# Patient Record
Sex: MALE | Race: WHITE | NOT HISPANIC OR LATINO | Employment: OTHER | URBAN - METROPOLITAN AREA
[De-identification: names, ages, dates, MRNs, and addresses within clinical notes are randomized per-mention and may not be internally consistent; named-entity substitution may affect disease eponyms.]

---

## 2017-03-16 DIAGNOSIS — I10 ESSENTIAL HYPERTENSION WITH GOAL BLOOD PRESSURE LESS THAN 140/90: ICD-10-CM

## 2017-03-17 NOTE — TELEPHONE ENCOUNTER
hydrochlorothiazide (HYDRODIURIL) 25 MG      Last Written Prescription Date: 10/9/2016  Last Fill Quantity: 90, # refills: 1  Last Office Visit with [facility], [facility] or [facility] prescribing provider: 5/25/2016       Potassium   Date Value Ref Range Status   05/25/2016 4.3 3.4 - 5.3 mmol/L Final     Creatinine   Date Value Ref Range Status   05/25/2016 1.45 (H) 0.66 - 1.25 mg/dL Final     BP Readings from Last 3 Encounters:   10/06/16 151/88   05/25/16 (!) 153/98   04/21/15 118/80

## 2017-03-21 RX ORDER — HYDROCHLOROTHIAZIDE 25 MG/1
25 TABLET ORAL DAILY
Qty: 90 TABLET | Refills: 0 | Status: SHIPPED | OUTPATIENT
Start: 2017-03-21 | End: 2017-05-31

## 2017-03-21 NOTE — TELEPHONE ENCOUNTER
Routing refill request to provider for review/approval because:  Labs out of range:  Cr, BP above desired range. Please advise.  Alexandra Valladares, RN  Triage Nurse

## 2017-04-14 ENCOUNTER — TRANSFERRED RECORDS (OUTPATIENT)
Dept: HEALTH INFORMATION MANAGEMENT | Facility: CLINIC | Age: 57
End: 2017-04-14

## 2017-04-20 ENCOUNTER — TRANSFERRED RECORDS (OUTPATIENT)
Dept: HEALTH INFORMATION MANAGEMENT | Facility: CLINIC | Age: 57
End: 2017-04-20

## 2017-05-31 ENCOUNTER — OFFICE VISIT (OUTPATIENT)
Dept: PEDIATRICS | Facility: CLINIC | Age: 57
End: 2017-05-31
Payer: COMMERCIAL

## 2017-05-31 VITALS
TEMPERATURE: 98.6 F | BODY MASS INDEX: 34.1 KG/M2 | WEIGHT: 225 LBS | DIASTOLIC BLOOD PRESSURE: 72 MMHG | SYSTOLIC BLOOD PRESSURE: 110 MMHG | HEART RATE: 60 BPM | HEIGHT: 68 IN

## 2017-05-31 DIAGNOSIS — I10 ESSENTIAL HYPERTENSION WITH GOAL BLOOD PRESSURE LESS THAN 140/90: ICD-10-CM

## 2017-05-31 DIAGNOSIS — K60.2 ANAL FISSURE: ICD-10-CM

## 2017-05-31 DIAGNOSIS — Z12.5 ENCOUNTER FOR SCREENING FOR MALIGNANT NEOPLASM OF PROSTATE: ICD-10-CM

## 2017-05-31 DIAGNOSIS — E78.5 HYPERLIPIDEMIA LDL GOAL <130: ICD-10-CM

## 2017-05-31 DIAGNOSIS — Z01.818 PREOP GENERAL PHYSICAL EXAM: Primary | ICD-10-CM

## 2017-05-31 LAB
ALBUMIN SERPL-MCNC: 4.3 G/DL (ref 3.4–5)
ALP SERPL-CCNC: 100 U/L (ref 40–150)
ALT SERPL W P-5'-P-CCNC: 57 U/L (ref 0–70)
ANION GAP SERPL CALCULATED.3IONS-SCNC: 11 MMOL/L (ref 3–14)
AST SERPL W P-5'-P-CCNC: 39 U/L (ref 0–45)
BILIRUB SERPL-MCNC: 1.3 MG/DL (ref 0.2–1.3)
BUN SERPL-MCNC: 19 MG/DL (ref 7–30)
CALCIUM SERPL-MCNC: 8.8 MG/DL (ref 8.5–10.1)
CHLORIDE SERPL-SCNC: 104 MMOL/L (ref 94–109)
CHOLEST SERPL-MCNC: 215 MG/DL
CO2 SERPL-SCNC: 23 MMOL/L (ref 20–32)
CREAT SERPL-MCNC: 1.81 MG/DL (ref 0.66–1.25)
GFR SERPL CREATININE-BSD FRML MDRD: 39 ML/MIN/1.7M2
GLUCOSE SERPL-MCNC: 100 MG/DL (ref 70–99)
HDLC SERPL-MCNC: 42 MG/DL
LDLC SERPL CALC-MCNC: 128 MG/DL
NONHDLC SERPL-MCNC: 173 MG/DL
POTASSIUM SERPL-SCNC: 4.1 MMOL/L (ref 3.4–5.3)
PROT SERPL-MCNC: 7.9 G/DL (ref 6.8–8.8)
PSA SERPL-ACNC: 8.47 UG/L (ref 0–4)
SODIUM SERPL-SCNC: 138 MMOL/L (ref 133–144)
TRIGL SERPL-MCNC: 225 MG/DL

## 2017-05-31 PROCEDURE — 80053 COMPREHEN METABOLIC PANEL: CPT | Performed by: INTERNAL MEDICINE

## 2017-05-31 PROCEDURE — 80061 LIPID PANEL: CPT | Performed by: INTERNAL MEDICINE

## 2017-05-31 PROCEDURE — G0103 PSA SCREENING: HCPCS | Performed by: INTERNAL MEDICINE

## 2017-05-31 PROCEDURE — 36415 COLL VENOUS BLD VENIPUNCTURE: CPT | Performed by: INTERNAL MEDICINE

## 2017-05-31 PROCEDURE — 99214 OFFICE O/P EST MOD 30 MIN: CPT | Performed by: INTERNAL MEDICINE

## 2017-05-31 RX ORDER — LISINOPRIL AND HYDROCHLOROTHIAZIDE 12.5; 2 MG/1; MG/1
1 TABLET ORAL
Qty: 180 TABLET | Refills: 3 | Status: SHIPPED | OUTPATIENT
Start: 2017-05-31 | End: 2017-07-02

## 2017-05-31 RX ORDER — LISINOPRIL 20 MG/1
20 TABLET ORAL 2 TIMES DAILY
Qty: 180 TABLET | Refills: 3 | Status: CANCELLED | OUTPATIENT
Start: 2017-05-31

## 2017-05-31 RX ORDER — SIMVASTATIN 40 MG
40 TABLET ORAL AT BEDTIME
Qty: 90 TABLET | Refills: 3 | Status: SHIPPED | OUTPATIENT
Start: 2017-05-31 | End: 2018-04-21

## 2017-05-31 RX ORDER — HYDROCHLOROTHIAZIDE 25 MG/1
25 TABLET ORAL DAILY
Qty: 90 TABLET | Refills: 0 | Status: CANCELLED | OUTPATIENT
Start: 2017-05-31

## 2017-05-31 NOTE — PATIENT INSTRUCTIONS
INSTRUCTIONS FOR TODAY:     we are combining lisinopril and hydrochlorothiazide in 1 pill--taken twice per day   do not take lisinopril morning of surgery     Dr Reagan      Preventive Health Recommendations  Male Ages 50 - 64    Yearly exam:             See your health care provider every year in order to  o   Review health changes.   o   Discuss preventive care.    o   Review your medicines if your doctor has prescribed any.     Have a cholesterol test every 5 years, or more frequently if you are at risk for high cholesterol/heart disease.     Have a diabetes test (fasting glucose) every three years. If you are at risk for diabetes, you should have this test more often.     Have a colonoscopy at age 50, or have a yearly FIT test (stool test). These exams will check for colon cancer.      Talk with your health care provider about whether or not a prostate cancer screening test (PSA) is right for you.    You should be tested each year for STDs (sexually transmitted diseases), if you re at risk.     Shots: Get a flu shot each year. Get a tetanus shot every 10 years.     Nutrition:    Eat at least 5 servings of fruits and vegetables daily.     Eat whole-grain bread, whole-wheat pasta and brown rice instead of white grains and rice.     Talk to your provider about Calcium and Vitamin D.     Lifestyle    Exercise for at least 150 minutes a week (30 minutes a day, 5 days a week). This will help you control your weight and prevent disease.     Limit alcohol to one drink per day.     No smoking.     Wear sunscreen to prevent skin cancer.     See your dentist every six months for an exam and cleaning.     See your eye doctor every 1 to 2 years.    Before Your Surgery      Call your surgeon if there is any change in your health. This includes signs of a cold or flu (such as a sore throat, runny nose, cough, rash or fever).    Do not smoke, drink alcohol or take over the counter medicine (unless your surgeon or primary care  doctor tells you to) for the 24 hours before and after surgery.    If you take prescribed drugs: Follow your doctor s orders about which medicines to take and which to stop until after surgery.    Eating and drinking prior to surgery: follow the instructions from your surgeon    Take a shower or bath the night before surgery. Use the soap your surgeon gave you to gently clean your skin. If you do not have soap from your surgeon, use your regular soap. Do not shave or scrub the surgery site.  Wear clean pajamas and have clean sheets on your bed.

## 2017-05-31 NOTE — MR AVS SNAPSHOT
After Visit Summary   5/31/2017    Yrn Will    MRN: [id number]           Patient Information     Date Of Birth          1960        Visit Information        Provider Department      5/31/2017 8:20 AM Enrico Reagan MD [facility]        Today's Diagnoses     Preop general physical exam    -  1    Essential hypertension with goal blood pressure less than 140/90        Hyperlipidemia LDL goal <130        Encounter for screening for malignant neoplasm of prostate          Care Instructions    INSTRUCTIONS FOR TODAY:     we are combining lisinopril and hydrochlorothiazide in 1 pill--taken twice per day   do not take lisinopril morning of surgery     Dr Reagan      Preventive Health Recommendations  Male Ages 50 - 64    Yearly exam:             See your health care provider every year in order to  o   Review health changes.   o   Discuss preventive care.    o   Review your medicines if your doctor has prescribed any.     Have a cholesterol test every 5 years, or more frequently if you are at risk for high cholesterol/heart disease.     Have a diabetes test (fasting glucose) every three years. If you are at risk for diabetes, you should have this test more often.     Have a colonoscopy at age 50, or have a yearly FIT test (stool test). These exams will check for colon cancer.      Talk with your health care provider about whether or not a prostate cancer screening test (PSA) is right for you.    You should be tested each year for STDs (sexually transmitted diseases), if you re at risk.     Shots: Get a flu shot each year. Get a tetanus shot every 10 years.     Nutrition:    Eat at least 5 servings of fruits and vegetables daily.     Eat whole-grain bread, whole-wheat pasta and brown rice instead of white grains and rice.     Talk to your provider about Calcium and Vitamin D.     Lifestyle    Exercise for at least 150 minutes a week (30 minutes a day, 5 days a week). This will help  you control your weight and prevent disease.     Limit alcohol to one drink per day.     No smoking.     Wear sunscreen to prevent skin cancer.     See your dentist every six months for an exam and cleaning.     See your eye doctor every 1 to 2 years.    Before Your Surgery      Call your surgeon if there is any change in your health. This includes signs of a cold or flu (such as a sore throat, runny nose, cough, rash or fever).    Do not smoke, drink alcohol or take over the counter medicine (unless your surgeon or primary care doctor tells you to) for the 24 hours before and after surgery.    If you take prescribed drugs: Follow your doctor s orders about which medicines to take and which to stop until after surgery.    Eating and drinking prior to surgery: follow the instructions from your surgeon    Take a shower or bath the night before surgery. Use the soap your surgeon gave you to gently clean your skin. If you do not have soap from your surgeon, use your regular soap. Do not shave or scrub the surgery site.  Wear clean pajamas and have clean sheets on your bed.           Follow-ups after your visit        Who to contact     If you have questions or need follow up information about today's clinic visit or your schedule please contact [facility] directly at [phone].  Normal or non-critical lab and imaging results will be communicated to you by [employer], letter or phone within 4 business days after the clinic has received the results. If you do not hear from us within 7 days, please contact the clinic through [employer] or phone. If you have a critical or abnormal lab result, we will notify you by phone as soon as possible.  Submit refill requests through [employer] or call your pharmacy and they will forward the refill request to us. Please allow 3 business days for your refill to be completed.          Additional Information About Your Visit        [employer] Information     [employer] gives you secure  "access to your electronic health record. If you see a primary care provider, you can also send messages to your care team and make appointments. If you have questions, please call your primary care clinic.  If you do not have a primary care provider, please call [phone] and they will assist you.        Care EveryWhere ID     This is your Care EveryWhere ID. This could be used by other organizations to access your [place] medical records  [id number]        Your Vitals Were     Pulse Temperature Height BMI (Body Mass Index)          60 98.6  F (37  C) (Oral) 5' 8\" (1.727 m) 34.21 kg/m2         Blood Pressure from Last 3 Encounters:   05/31/17 110/72   10/06/16 151/88   05/25/16 (!) 153/98    Weight from Last 3 Encounters:   05/31/17 225 lb (102.1 kg)   05/25/16 227 lb (103 kg)   04/21/15 225 lb (102.1 kg)              We Performed the Following     Comprehensive metabolic panel (BMP + Alb, Alk Phos, ALT, AST, Total. Bili, TP)     Lipid Profile with reflex to direct LDL     PSA, screen          Today's Medication Changes          These changes are accurate as of: 5/31/17  8:49 AM.  If you have any questions, ask your nurse or doctor.               Start taking these medicines.        Dose/Directions    lisinopril-hydrochlorothiazide 20-12.5 MG per tablet   Commonly known as:  PRINZIDE/ZESTORETIC   Used for:  Essential hypertension with goal blood pressure less than 140/90   Started by:  Enrico Reagan MD        Dose:  1 tablet   Take 1 tablet by mouth 2 times daily   Quantity:  180 tablet   Refills:  3         Stop taking these medicines if you haven't already. Please contact your care team if you have questions.     hydrochlorothiazide 25 MG tablet   Commonly known as:  HYDRODIURIL   Stopped by:  Enrico Reagan MD           lisinopril 20 MG tablet   Commonly known as:  PRINIVIL/ZESTRIL   Stopped by:  Enrico Reagan MD                Where to get your medicines      These medications were sent " to [facility] - [place], [place] - [address] [facility]  [address] [facility] [address]     Phone:  [phone]     lisinopril-hydrochlorothiazide 20-12.5 MG per tablet    simvastatin 40 MG tablet                Primary Care Provider Office Phone # Fax #    Enrico Reagan [phone]       [address] [place] [address]        Thank you!     Thank you for choosing [facility]  for your care. Our goal is always to provide you with excellent care. Hearing back from our patients is one way we can continue to improve our services. Please take a few minutes to complete the written survey that you may receive in the mail after your visit with us. Thank you!             Your Updated Medication List - Protect others around you: Learn how to safely use, store and throw away your medicines at www.disposemymeds.org.          This list is accurate as of: 5/31/17  8:49 AM.  Always use your most recent med list.                   Brand Name Dispense Instructions for use    albuterol 108 (90 BASE) MCG/ACT Inhaler    PROAIR HFA/PROVENTIL HFA/VENTOLIN HFA    2 Inhaler    Inhale 2 puffs into the lungs every 4 hours as needed for shortness of breath / dyspnea       aspirin 325 MG tablet      Take  by mouth daily.       lisinopril-hydrochlorothiazide 20-12.5 MG per tablet    PRINZIDE/ZESTORETIC    180 tablet    Take 1 tablet by mouth 2 times daily       omeprazole 20 MG CR capsule    priLOSEC    90 capsule    Take 1 capsule (20 mg) by mouth daily as needed       simvastatin 40 MG tablet    ZOCOR    90 tablet    Take 1 tablet (40 mg) by mouth At Bedtime

## 2017-05-31 NOTE — PROGRESS NOTES
"SUBJECTIVE:     CC: Yrn iWll is an 57 year old male who presents for preventative health visit.     Physical   Annual:     Getting at least 3 servings of Calcium per day::  Yes    Bi-annual eye exam::  Yes    Dental care twice a year::  Yes    Sleep apnea or symptoms of sleep apnea::  None    Diet::  Regular (no restrictions)    Taking medications regularly::  Yes    Medication side effects::  None    Additional concerns today::  No              Today's PHQ-2 Score:   PHQ-2 ( 1999 [employer]izer) 5/31/2017   Q1: Little interest or pleasure in doing things 0   Q2: Feeling down, depressed or hopeless 0   PHQ-2 Score 0   Q1: Little interest or pleasure in doing things Not at all   Q2: Feeling down, depressed or hopeless Not at all   PHQ-2 Score 0       Abuse: Current or Past(Physical, Sexual or Emotional)- No  Do you feel safe in your environment - Yes    Social History   Substance Use Topics     Smoking status: Never Smoker     Smokeless tobacco: Never Used     Alcohol use No     The patient does not drink >3 drinks per day nor >7 drinks per week.    Last PSA:   PSA   Date Value Ref Range Status   10/06/2016 5.52 (H) 0 - 4 ug/L Final     Comment:     Assay Method:  Chemiluminescence using Siemens Vista analyzer       Recent Labs   Lab Test  05/25/16   0848  01/09/15   0820  01/02/14   0844   CHOL  188  188  174   HDL  44  49  47   LDL  116*  108  102   TRIG  138  157*  122   CHOLHDLRATIO   --   3.8  3.7   NHDL  144*   --    --        Reviewed orders with patient. Reviewed health maintenance and updated orders accordingly - Yes    Reviewed and updated as needed this visit by clinical staff         Reviewed and updated as needed this visit by Provider        {HISTORY OPTIONS:775982}    ROS:  {MALE ROS-adult preventive care package:274345::\"C: NEGATIVE for fever, chills, change in weight\",\"I: NEGATIVE for worrisome rashes, moles or lesions\",\"E: NEGATIVE for vision changes or irritation\",\"ENT: NEGATIVE for ear, mouth and " "throat problems\",\"R: NEGATIVE for significant cough or SOB\",\"CV: NEGATIVE for chest pain, palpitations or peripheral edema\",\"GI: NEGATIVE for nausea, abdominal pain, heartburn, or change in bowel habits\",\" male: negative for dysuria, hematuria, decreased urinary stream, erectile dysfunction, urethral discharge\",\"M: NEGATIVE for significant arthralgias or myalgia\",\"N: NEGATIVE for weakness, dizziness or paresthesias\",\"P: NEGATIVE for changes in mood or affect\"}    {CHRONICPROBDATA:081238}  OBJECTIVE:     There were no vitals taken for this visit.  EXAM:  {Exam Choices:863160}    ASSESSMENT/PLAN:     {Diag Picklist:184154}    COUNSELING:   {MALE COUNSELING MESSAGES:960846::\"Reviewed preventive health counseling, as reflected in patient instructions\"}    {Blood Pressure - Adult Preventive:165486}     reports that he has never smoked. He has never used smokeless tobacco.  {Tobacco Cessation needed for ACO -- Delete if patient is a non-smoker:525525}  Estimated body mass index is 34.52 kg/(m^2) as calculated from the following:    Height as of 5/25/16: 5' 8\" (1.727 m).    Weight as of 5/25/16: 227 lb (103 kg).   {Weight Management Plan needed for ACO:219432}    Counseling Resources:  ATP IV Guidelines  Pooled Cohorts Equation Calculator  FRAX Risk Assessment  ICSI Preventive Guidelines  Dietary Guidelines for Americans, 2010  USDA's MyPlate  ASA Prophylaxis  Lung CA Screening    Enrico Reagan MD, MD  [facility] [place]  "

## 2017-05-31 NOTE — PROGRESS NOTES
[facility]  [address]  [address]  [place] [address] [phone]  Dept: [phone]    PRE-OP EVALUATION:  Today's date: 2017    Yrn Will (: 1960) presents for pre-operative evaluation assessment as requested by Dr. Stanley.  He requires evaluation and anesthesia risk assessment prior to undergoing surgery/procedure for treatment of fissure .  Proposed procedure: repair    Date of Surgery/ Procedure: 17  Time of Surgery/ Procedure:   Hospital/Surgical Facility: [facility]    Primary Physician: Enrico Reagan  Type of Anesthesia Anticipated: Local    Patient has a Health Care Directive or Living Will:  NO    1. NO - Do you have a history of heart attack, stroke, stent, bypass or surgery on an artery in the head, neck, heart or legs?  2. NO - Do you ever have any pain or discomfort in your chest?  3. NO - Do you have a history of  Heart Failure?  4. NO - Are you troubled by shortness of breath when: walking on the level, up a slight hill or at night?  5. NO - Do you currently have a cold, bronchitis or other respiratory infection?  6. NO - Do you have a cough, shortness of breath or wheezing?  7. NO - Do you sometimes get pains in the calves of your legs when you walk?  8. NO - Do you or anyone in your family have previous history of blood clots?  9. NO - Do you or does anyone in your family have a serious bleeding problem such as prolonged bleeding following surgeries or cuts?  10. NO - Have you ever had problems with anemia or been told to take iron pills?  11. NO - Have you had any abnormal blood loss such as black, tarry or bloody stools, or abnormal vaginal bleeding?  12. NO - Have you ever had a blood transfusion?  13. NO - Have you or any of your relatives ever had problems with anesthesia?  14. NO - Do you have sleep apnea, excessive snoring or daytime drowsiness?  15. NO - Do you have any prosthetic heart valves?  16. NO - Do you have  prosthetic joints?      HPI:                                                      Brief HPI related to upcoming procedure:   Yrn Will is a 57 year old who presents for preoperative evaluation as requested by Dr Stanley prior to repair anal fissure.     MEDICAL HISTORY:                                                      Patient Active Problem List    Diagnosis Date Noted     CKD (chronic kidney disease) stage 3, GFR 30-59 ml/min 01/11/2014     Priority: High     Prediabetes 02/26/2012     Priority: High     Hyperlipidemia LDL goal <130 09/16/2009     Priority: High     Elevated prostate specific antigen (PSA) 06/04/2016     Priority: Medium     Essential hypertension with goal blood pressure less than 140/90 05/25/2016     Priority: Medium     Gout, unspecified cause, unspecified chronicity, unspecified site 05/25/2016     Priority: Medium     Gastroesophageal reflux disease without esophagitis 05/25/2016     Priority: Medium     Obesity, unspecified obesity severity, unspecified obesity type 05/25/2016     Priority: Medium     Benign non-nodular prostatic hyperplasia with lower urinary tract symptoms 05/25/2016     Priority: Medium     Gilbert's disease      Priority: Medium     Hepatic steatosis 10/25/2009     Priority: Medium     Advanced directives, counseling/discussion 01/02/2014     Priority: Low     Advance Care Planning:                 Past Medical History:   Diagnosis Date     Esophageal reflux 9/16/2009     Gilbert's disease      Gout 9/16/2009     HTN (hypertension) 9/16/2009     Hyperlipidemia LDL goal <130 9/16/2009     Past Surgical History:   Procedure Laterality Date     LASIK  2006     SURGICAL HISTORY OF -       repair right hydronephrosis, age 14     Current Outpatient Prescriptions   Medication Sig Dispense Refill     simvastatin (ZOCOR) 40 MG tablet Take 1 tablet (40 mg) by mouth At Bedtime 90 tablet 3     lisinopril-hydrochlorothiazide (PRINZIDE/ZESTORETIC) 20-12.5 MG per tablet Take 1  "tablet by mouth 2 times daily 180 tablet 3     omeprazole (PRILOSEC) 20 MG capsule Take 1 capsule (20 mg) by mouth daily as needed 90 capsule 3             albuterol (PROAIR HFA, PROVENTIL HFA, VENTOLIN HFA) 108 (90 BASE) MCG/ACT inhaler Inhale 2 puffs into the lungs every 4 hours as needed for shortness of breath / dyspnea 2 Inhaler 3     aspirin 325 MG tablet Take  by mouth daily.       OTC products: None, except as noted above    No Known Allergies   Latex Allergy: NO    Social History   Substance Use Topics     Smoking status: Never Smoker     Smokeless tobacco: Never Used     Alcohol use No     History   Drug Use No       REVIEW OF SYSTEMS:                                                    Constitutional, neuro, ENT, endocrine, pulmonary, cardiac, gastrointestinal, genitourinary, musculoskeletal, integument and psychiatric systems are negative, except as otherwise noted.    EXAM:                                                    /72 (Cuff Size: Adult Large)  Pulse 60  Temp 98.6  F (37  C) (Oral)  Ht 5' 8\" (1.727 m)  Wt 225 lb (102.1 kg)  BMI 34.21 kg/m2    GENERAL APPEARANCE: healthy, alert and no distress     EYES: EOMI,  PERRL     HENT: ear canals and TM's normal and nose and mouth without ulcers or lesions     NECK: no adenopathy, no asymmetry, masses, or scars and thyroid normal to palpation     RESP: lungs clear to auscultation - no rales, rhonchi or wheezes     CV: regular rates and rhythm, normal S1 S2, no S3 or S4 and no murmur, click or rub     ABDOMEN:  soft, nontender, no HSM or masses and bowel sounds normal     MS: Extremities without edema     SKIN: no suspicious lesions or rashes     NEURO: Normal strength and tone, sensory exam grossly normal, mentation intact and speech normal     PSYCH: mentation appears normal. and affect normal/bright    DIAGNOSTICS:                                                    Labwork pending    Recent Labs   Lab Test  05/25/16   0848  01/09/15   0820  " 01/09/14   1150   01/03/13   0847   HGB   --    --   16.2   --    --    NA  142  140   --    < >  144   POTASSIUM  4.3  4.2   --    < >  4.4   CR  1.45*  1.39*   --    < >  1.38*   A1C   --    --    --    --   5.5    < > = values in this interval not displayed.      IMPRESSION:                                                    Reason for surgery/procedure: anal fissure  Diagnosis/reason for consult: preop    The proposed surgical procedure is considered LOW risk.    REVISED CARDIAC RISK INDEX  The patient has the following serious cardiovascular risks for perioperative complications such as (MI, PE, VFib and 3  AV Block):  No serious cardiac risks  INTERPRETATION: 0 risks: Class I (very low risk - 0.4% complication rate)    The patient has the following additional risks for perioperative complications:  No identified additional risks      ICD-10-CM    1. Preop general physical exam Z01.818      2. Anal fissure K60.2      3. Essential hypertension with goal blood pressure less than 140/90 I10 lisinopril-hydrochlorothiazide (PRINZIDE/ZESTORETIC) 20-12.5 MG per tablet     Well-controlled, hold lisinopril morning of procedure      4. Hyperlipidemia LDL goal <130 E78.5 simvastatin (ZOCOR) 40 MG tablet     Lipid Profile with reflex to direct LDL     Comprehensive metabolic panel (BMP + Alb, Alk Phos, ALT, AST, Total. Bili, TP)      Fasting for lab work today.      5. Encounter for screening for malignant neoplasm of prostate Z12.5 PSA, screen              RECOMMENDATIONS:                                                      --Patient is to take all scheduled medications on the day of surgery EXCEPT as noted above    APPROVAL GIVEN to proceed with proposed procedure, without further diagnostic evaluation       Signed Electronically by: Enrico Reagan MD, MD    Copy of this evaluation report is provided to requesting physician.    [facility] Preop Guidelines

## 2017-05-31 NOTE — NURSING NOTE
"Chief Complaint   Patient presents with     Physical       Initial /72 (Cuff Size: Adult Large)  Pulse 60  Temp 98.6  F (37  C) (Oral)  Ht 5' 8\" (1.727 m)  Wt 225 lb (102.1 kg)  BMI 34.21 kg/m2 Estimated body mass index is 34.21 kg/(m^2) as calculated from the following:    Height as of this encounter: 5' 8\" (1.727 m).    Weight as of this encounter: 225 lb (102.1 kg).  Medication Reconciliation: capri Mann CMA    "

## 2017-06-04 ENCOUNTER — TELEPHONE (OUTPATIENT)
Dept: PEDIATRICS | Facility: CLINIC | Age: 57
End: 2017-06-04

## 2017-06-04 DIAGNOSIS — R97.20 ELEVATED PROSTATE SPECIFIC ANTIGEN (PSA): Primary | ICD-10-CM

## 2017-06-04 DIAGNOSIS — N18.30 CKD (CHRONIC KIDNEY DISEASE) STAGE 3, GFR 30-59 ML/MIN (H): ICD-10-CM

## 2017-06-05 DIAGNOSIS — N18.30 CKD (CHRONIC KIDNEY DISEASE) STAGE 3, GFR 30-59 ML/MIN (H): ICD-10-CM

## 2017-06-05 PROCEDURE — 80048 BASIC METABOLIC PNL TOTAL CA: CPT | Performed by: INTERNAL MEDICINE

## 2017-06-05 PROCEDURE — 36415 COLL VENOUS BLD VENIPUNCTURE: CPT | Performed by: INTERNAL MEDICINE

## 2017-06-05 NOTE — TELEPHONE ENCOUNTER
Please call pt.      1) Recent labwork significant for rising PSA  Lab Results   Component Value Date    PSA 8.47 05/31/2017    PSA 5.52 10/06/2016     Needs to schedule visit with Urology:   Referral info:  [facility]: [facility] [phone]   [email or web address]    2) kidney function test has worsened some (may be due to period fasting)  Lab Results   Component Value Date    CR 1.81 05/31/2017    CR 1.45 05/25/2016      please have him return for non fasting labwork to recheck this week.    Enrico Reagan MD

## 2017-06-05 NOTE — TELEPHONE ENCOUNTER
Notified patient. Gave him the info from . He scheduled a lab appointment for today.  RICHELLE Mann

## 2017-06-06 LAB
ANION GAP SERPL CALCULATED.3IONS-SCNC: 7 MMOL/L (ref 3–14)
BUN SERPL-MCNC: 25 MG/DL (ref 7–30)
CALCIUM SERPL-MCNC: 8.7 MG/DL (ref 8.5–10.1)
CHLORIDE SERPL-SCNC: 105 MMOL/L (ref 94–109)
CO2 SERPL-SCNC: 28 MMOL/L (ref 20–32)
CREAT SERPL-MCNC: 1.89 MG/DL (ref 0.66–1.25)
GFR SERPL CREATININE-BSD FRML MDRD: 37 ML/MIN/1.7M2
GLUCOSE SERPL-MCNC: 98 MG/DL (ref 70–99)
POTASSIUM SERPL-SCNC: 4.5 MMOL/L (ref 3.4–5.3)
SODIUM SERPL-SCNC: 140 MMOL/L (ref 133–144)

## 2017-06-11 ENCOUNTER — TELEPHONE (OUTPATIENT)
Dept: PEDIATRICS | Facility: CLINIC | Age: 57
End: 2017-06-11

## 2017-06-11 DIAGNOSIS — N18.30 CKD (CHRONIC KIDNEY DISEASE) STAGE 3, GFR 30-59 ML/MIN (H): Primary | ICD-10-CM

## 2017-06-11 NOTE — TELEPHONE ENCOUNTER
Lab Results   Component Value Date    CR 1.89 06/05/2017    CR 1.81 05/31/2017     Please call patient. Follow-up renal function tests are slightly elevated compared to baseline.  Please have him reduce lisinopril HCTZ to 1 tab daily rather than 2.  Return for repeat lab draw in 1 week. Encourage plenty of fluids in the meantime.  Please have patient follow home blood pressure or return for nurse blood pressure check after the medication change in 1 week    (Suspect renal function has worsened secondary to ACEI combined with diuretic.  may need to add amlodipine)

## 2017-06-13 ENCOUNTER — TRANSFERRED RECORDS (OUTPATIENT)
Dept: HEALTH INFORMATION MANAGEMENT | Facility: CLINIC | Age: 57
End: 2017-06-13

## 2017-06-28 ENCOUNTER — ALLIED HEALTH/NURSE VISIT (OUTPATIENT)
Dept: PEDIATRICS | Facility: CLINIC | Age: 57
End: 2017-06-28
Payer: COMMERCIAL

## 2017-06-28 VITALS — SYSTOLIC BLOOD PRESSURE: 132 MMHG | DIASTOLIC BLOOD PRESSURE: 84 MMHG

## 2017-06-28 DIAGNOSIS — N18.30 CKD (CHRONIC KIDNEY DISEASE) STAGE 3, GFR 30-59 ML/MIN (H): ICD-10-CM

## 2017-06-28 DIAGNOSIS — I10 ESSENTIAL HYPERTENSION WITH GOAL BLOOD PRESSURE LESS THAN 140/90: Primary | ICD-10-CM

## 2017-06-28 LAB
ANION GAP SERPL CALCULATED.3IONS-SCNC: 7 MMOL/L (ref 3–14)
BUN SERPL-MCNC: 18 MG/DL (ref 7–30)
CALCIUM SERPL-MCNC: 9.2 MG/DL (ref 8.5–10.1)
CHLORIDE SERPL-SCNC: 107 MMOL/L (ref 94–109)
CO2 SERPL-SCNC: 26 MMOL/L (ref 20–32)
CREAT SERPL-MCNC: 1.76 MG/DL (ref 0.66–1.25)
GFR SERPL CREATININE-BSD FRML MDRD: 40 ML/MIN/1.7M2
GLUCOSE SERPL-MCNC: 97 MG/DL (ref 70–99)
POTASSIUM SERPL-SCNC: 4.5 MMOL/L (ref 3.4–5.3)
SODIUM SERPL-SCNC: 140 MMOL/L (ref 133–144)

## 2017-06-28 PROCEDURE — 36415 COLL VENOUS BLD VENIPUNCTURE: CPT | Performed by: INTERNAL MEDICINE

## 2017-06-28 PROCEDURE — 80048 BASIC METABOLIC PNL TOTAL CA: CPT | Performed by: INTERNAL MEDICINE

## 2017-06-28 PROCEDURE — 99207 ZZC NO CHARGE NURSE ONLY: CPT | Performed by: INTERNAL MEDICINE

## 2017-06-28 NOTE — PROGRESS NOTES
Yrn Will is enrolled/participating in the retail pharmacy Blood Pressure Goals Achievement Program (BPGAP).  Yrn Will was evaluated at [facility] on June 28, 2017 at which time his blood pressure was:    BP Readings from Last 3 Encounters:   06/28/17 132/84   05/31/17 110/72   10/06/16 151/88     Reviewed lifestyle modifications for blood pressure control and reduction: including making healthy food choices, managing weight, getting regular exercise, smoking cessation, reducing alcohol consumption, monitoring blood pressure regularly.     Yrn Will is not experiencing symptoms.    Follow-Up: BP is at goal of < 140/90mmHg (patient 18+ years of age with or without diabetes).  Recommended follow-up at pharmacy in 6 months.     Recommendation to Provider: none    Yrn Will was evaluated for enrollment into the PGEN study today.    Patient eligible for enrollment:  No  Patient interested in enrollment:  No    Completed by: Thank You~  Leonora Collazo, PharmD,   [facility], [place]  [phone]

## 2017-06-28 NOTE — MR AVS SNAPSHOT
After Visit Summary   6/28/2017    Yrn Will    MRN: [id number]           Patient Information     Date Of Birth          1960        Visit Information        Provider Department      6/28/2017 10:44 AM Enrico Reagan MD [facility]        Today's Diagnoses     Essential hypertension with goal blood pressure less than 140/90    -  1       Follow-ups after your visit        Your next 10 appointments already scheduled     Jun 28, 2017 11:30 AM CDT   LAB with EA LAB   [facility] ([facility])    [address]  [address]  [place] [id number] [phone]           Patient must bring picture ID.  Patient should be prepared to give a urine specimen  Please do not eat 10-12 hours before your appointment if you are coming in fasting for labs on lipids, cholesterol, or glucose (sugar).  Pregnant women should follow their Care Team instructions. Water with medications is okay. Do not drink coffee or other fluids.   If you have concerns about taking  your medications, please ask at office or if scheduling via [employer], send a message by clicking on Secure Messaging, Message Your Care Team.              Who to contact     If you have questions or need follow up information about today's clinic visit or your schedule please contact [facility] directly at [phone].  Normal or non-critical lab and imaging results will be communicated to you by MyChart, letter or phone within 4 business days after the clinic has received the results. If you do not hear from us within 7 days, please contact the clinic through [employer] or phone. If you have a critical or abnormal lab result, we will notify you by phone as soon as possible.  Submit refill requests through [employer] or call your pharmacy and they will forward the refill request to us. Please allow 3 business days for your refill to be completed.          Additional Information About Your  Visit        MyChart Information     [employer] gives you secure access to your electronic health record. If you see a primary care provider, you can also send messages to your care team and make appointments. If you have questions, please call your primary care clinic.  If you do not have a primary care provider, please call [phone] and they will assist you.        Care EveryWhere ID     This is your Care EveryWhere ID. This could be used by other organizations to access your [facility] medical records  [id number]         Blood Pressure from Last 3 Encounters:   06/28/17 132/84   05/31/17 110/72   10/06/16 151/88    Weight from Last 3 Encounters:   05/31/17 225 lb (102.1 kg)   05/25/16 227 lb (103 kg)   04/21/15 225 lb (102.1 kg)              Today, you had the following     No orders found for display       Primary Care Provider Office Phone # Fax #    Enrico Reagan [phone]       [facility] [address] [place] [address]        Equal Access to Services     [facility]: Hadii aad ku hadasho Soomaali, waaxda luqadaha, qaybta kaalmada adeegyada, waxay daltonin haykayla dill . So [facility] [phone].    ATENCIÓN: Si habla español, tiene a bello disposición servicios gratuitos de asistencia lingüística. Llame al [phone].    We comply with applicable federal civil rights laws and [place] laws. We do not discriminate on the basis of race, color, national origin, age, disability sex, sexual orientation or gender identity.            Thank you!     Thank you for choosing [facility]  for your care. Our goal is always to provide you with excellent care. Hearing back from our patients is one way we can continue to improve our services. Please take a few minutes to complete the written survey that you may receive in the mail after your visit with us. Thank you!             Your Updated Medication List - Protect others around you: Learn how to  safely use, store and throw away your medicines at www.disposemymeds.org.          This list is accurate as of: 6/28/17 10:46 AM.  Always use your most recent med list.                   Brand Name Dispense Instructions for use Diagnosis    albuterol 108 (90 BASE) MCG/ACT Inhaler    PROAIR HFA/PROVENTIL HFA/VENTOLIN HFA    2 Inhaler    Inhale 2 puffs into the lungs every 4 hours as needed for shortness of breath / dyspnea    Dyspnea       aspirin 325 MG tablet      Take  by mouth daily.        lisinopril-hydrochlorothiazide 20-12.5 MG per tablet    PRINZIDE/ZESTORETIC    180 tablet    Take 1 tablet by mouth 2 times daily    Essential hypertension with goal blood pressure less than 140/90       omeprazole 20 MG CR capsule    priLOSEC    90 capsule    Take 1 capsule (20 mg) by mouth daily as needed    Gastroesophageal reflux disease without esophagitis       simvastatin 40 MG tablet    ZOCOR    90 tablet    Take 1 tablet (40 mg) by mouth At Bedtime    Hyperlipidemia LDL goal <130

## 2017-06-29 ENCOUNTER — HOSPITAL ENCOUNTER (EMERGENCY)
Facility: CLINIC | Age: 57
Discharge: HOME | End: 2017-06-29
Attending: EMERGENCY MEDICINE | Admitting: EMERGENCY MEDICINE
Payer: COMMERCIAL

## 2017-06-29 VITALS
OXYGEN SATURATION: 99 % | HEART RATE: 84 BPM | SYSTOLIC BLOOD PRESSURE: 158 MMHG | RESPIRATION RATE: 17 BRPM | TEMPERATURE: 96.6 F | DIASTOLIC BLOOD PRESSURE: 90 MMHG

## 2017-06-29 DIAGNOSIS — M79.672 LEFT FOOT PAIN: ICD-10-CM

## 2017-06-29 PROCEDURE — 25000132 ZZH RX MED GY IP 250 OP 250 PS 637: Performed by: EMERGENCY MEDICINE

## 2017-06-29 PROCEDURE — 99283 EMERGENCY DEPT VISIT LOW MDM: CPT

## 2017-06-29 RX ORDER — INDOMETHACIN 50 MG/1
50 CAPSULE ORAL
Qty: 20 CAPSULE | Refills: 0 | Status: SHIPPED | OUTPATIENT
Start: 2017-06-29 | End: 2017-08-02

## 2017-06-29 RX ORDER — METHOCARBAMOL 750 MG/1
750 TABLET, FILM COATED ORAL ONCE
Status: COMPLETED | OUTPATIENT
Start: 2017-06-29 | End: 2017-06-29

## 2017-06-29 RX ADMIN — METHOCARBAMOL 750 MG: 750 TABLET ORAL at 04:34

## 2017-06-29 NOTE — ED NOTES
Left foot pain since 7 pm last night. laft middle of foot.    Motrin at 0000 and 0300    Pt A&O x 3, CMS x 3, ABCD's adequate in triage

## 2017-06-29 NOTE — ED AVS SNAPSHOT
[facility] Emergency Department    [address]    [facility] [address]    Phone:  [phone]    Fax:  [phone]                                       Yrn Will   MRN: [id number]    Department:  [facility] Emergency Department   Date of Visit:  6/29/2017           After Visit Summary Signature Page     I have received my discharge instructions, and my questions have been answered. I have discussed any challenges I see with this plan with the nurse or doctor.    ..........................................................................................................................................  Patient/Patient Representative Signature      ..........................................................................................................................................  Patient Representative Print Name and Relationship to Patient    ..................................................               ................................................  Date                                            Time    ..........................................................................................................................................  Reviewed by Signature/Title    ...................................................              ..............................................  Date                                                            Time

## 2017-06-29 NOTE — ED AVS SNAPSHOT
[facility] [place] Emergency Department    [address] [place] [address]    Phone:  [phone]    Fax:  [phone]                                       Yrn Will   MRN: [id number]    Department:  [facility] Emergency Department   Date of Visit:  6/29/2017           Patient Information     Date Of Birth          1960        Your diagnoses for this visit were:     Left foot pain        You were seen by Robby Ruby MD.      Follow-up Information     Follow up with Enrico Reagan MD.    Specialties:  Internal Medicine, Pediatrics    Why:  2 days    Contact information:    [facility] [place] [address] [place] [address]  [phone]        Discharge References/Attachments     PAIN, ACUTE, UNCERTAIN CAUSE (ENGLISH)      24 Hour Appointment Hotline       To make an appointment at any [facility], call [phone] ([phone]). If you don't have a family doctor or clinic, we will help you find one. [facility] clinics are conveniently located to serve the needs of you and your family.             Review of your medicines      START taking        Dose / Directions Last dose taken    indomethacin 50 MG capsule   Commonly known as:  INDOCIN   Dose:  50 mg   Quantity:  20 capsule        Take 1 capsule (50 mg) by mouth 3 times daily (with meals) for 10 days   Refills:  0          Our records show that you are taking the medicines listed below. If these are incorrect, please call your family doctor or clinic.        Dose / Directions Last dose taken    albuterol 108 (90 BASE) MCG/ACT Inhaler   Commonly known as:  PROAIR HFA/PROVENTIL HFA/VENTOLIN HFA   Dose:  2 puff   Quantity:  2 Inhaler        Inhale 2 puffs into the lungs every 4 hours as needed for shortness of breath / dyspnea   Refills:  3        aspirin 325 MG tablet        Take  by mouth daily.   Refills:  0        lisinopril-hydrochlorothiazide 20-12.5 MG per tablet   Commonly  known as:  PRINZIDE/ZESTORETIC   Dose:  1 tablet   Quantity:  180 tablet        Take 1 tablet by mouth 2 times daily   Refills:  3        omeprazole 20 MG CR capsule   Commonly known as:  priLOSEC   Dose:  20 mg   Quantity:  90 capsule        Take 1 capsule (20 mg) by mouth daily as needed   Refills:  3        simvastatin 40 MG tablet   Commonly known as:  ZOCOR   Dose:  40 mg   Quantity:  90 tablet        Take 1 tablet (40 mg) by mouth At Bedtime   Refills:  3                Prescriptions were sent or printed at these locations (1 Prescription)                   Other Prescriptions                Printed at Department/Unit printer (1 of 1)         indomethacin (INDOCIN) 50 MG capsule                Orders Needing Specimen Collection     None      Pending Results     No orders found from 6/27/2017 to 6/30/2017.            Pending Culture Results     No orders found from 6/27/2017 to 6/30/2017.            Pending Results Instructions     If you had any lab results that were not finalized at the time of your Discharge, you can call the ED Lab Result RN at [phone]. You will be contacted by this team for any positive Lab results or changes in treatment. The nurses are available 7 days a week from 10A to 6:30P.  You can leave a message 24 hours per day and they will return your call.        Test Results From Your Hospital Stay               Clinical Quality Measure: Blood Pressure Screening     Your blood pressure was checked while you were in the emergency department today. The last reading we obtained was  BP: (!) 164/104 . Please read the guidelines below about what these numbers mean and what you should do about them.  If your systolic blood pressure (the top number) is less than 120 and your diastolic blood pressure (the bottom number) is less than 80, then your blood pressure is normal. There is nothing more that you need to do about it.  If your systolic blood pressure (the top number) is 120-139 or your  "diastolic blood pressure (the bottom number) is 80-89, your blood pressure may be higher than it should be. You should have your blood pressure rechecked within a year by a primary care provider.  If your systolic blood pressure (the top number) is 140 or greater or your diastolic blood pressure (the bottom number) is 90 or greater, you may have high blood pressure. High blood pressure is treatable, but if left untreated over time it can put you at risk for heart attack, stroke, or kidney failure. You should have your blood pressure rechecked by a primary care provider within the next 4 weeks.  If your provider in the emergency department today gave you specific instructions to follow-up with your doctor or provider even sooner than that, you should follow that instruction and not wait for up to 4 weeks for your follow-up visit.        Thank you for choosing [facility]       Thank you for choosing [facility] for your care. Our goal is always to provide you with excellent care. Hearing back from our patients is one way we can continue to improve our services. Please take a few minutes to complete the written survey that you may receive in the mail after you visit with us. Thank you!        [employer] Information     [employer] lets you send messages to your doctor, view your test results, renew your prescriptions, schedule appointments and more. To sign up, go to [email or web address] . Click on \"Log in\" on the left side of the screen, which will take you to the Welcome page. Then click on \"Sign up Now\" on the right side of the page.     You will be asked to enter the access code listed below, as well as some personal information. Please follow the directions to create your username and password.     Your access code is: [email or web address]  Expires: 2017  5:22 AM     Your access code will  in 90 days. If you need help or a new code, please call your [facility] clinic or [phone].        Care EveryWhere ID     This is " your Care EveryWhere ID. This could be used by other organizations to access your [facility] medical records  [id number]        Equal Access to Services     ADDISON KAPOOR : Milka Batres, steph salinas, virgen foster, oxana aguilar. So [facility] [phone].    ATENCIÓN: Si habla español, tiene a bello disposición servicios gratuitos de asistencia lingüística. Llame al [phone].    We comply with applicable federal civil rights laws and [place] laws. We do not discriminate on the basis of race, color, national origin, age, disability sex, sexual orientation or gender identity.            After Visit Summary       This is your record. Keep this with you and show to your community pharmacist(s) and doctor(s) at your next visit.

## 2017-06-29 NOTE — ED NOTES
Post op shoe and crutches  Given   Tolerated instructions  Medications explained   Will d/c  Calling his son for ride

## 2017-06-29 NOTE — ED PROVIDER NOTES
History     Chief Complaint:  Left foot pain    HPI   Yrn Will is a 57 year old male who presents with left foot pain. The patient reports that his pain started last night around 1800. He was going to try to wait for urgent care, but could not sleep because of the pain which is why he presents here this morning. The pain radiates midway up the shin, especially when he is standing. The patient denies any trauma to the foot, he has not had any fever or redness. The patient has never had this type of pain before except when he has had gout flares, but that has never been in this location. He took ibuprofen around 0300, which was largely unhelpful.     Allergies:  No known drug allergies.     Medications:    Simvastatin  Lisinopril-HCTZ  Prilosec  Albuterol  Aspirin    Past Medical History:    GERD  Gilbert's disease  Gout  Hypertension  Hyperlipidemia  Hepatic steatosis    Past Surgical History:    Lasik--2006  Repair right hydronephrosis    Family History:    CAD--Brother  Hypertension--Father, Mother    Social History:  Marital Status:   Presents to the ED alone  Tobacco Use: Never  Alcohol Use: No  PCP: Enrico Reagan MD      Review of Systems   Musculoskeletal:        Positive for left foot pain   All other systems reviewed and are negative.    Physical Exam     Patient Vitals for the past 24 hrs:   BP Temp Temp src Pulse Resp SpO2   06/29/17 0538 158/90 - - 84 17 -   06/29/17 0409 (!) 164/104 96.6  F (35.9  C) Temporal 88 18 99 %         Physical Exam   Nursing note and vitals reviewed.  Constitutional: Cooperative. Patient appears uncomfortable.   Cardiovascular: Normal rate, regular rhythm. 2+ left DP pulse.  Pulmonary/Chest: Effort normal.   Musculoskeletal: LLE:  Full ROM at hip and knee. Lower leg compartments are soft. No edema. Pain with range of motion of the foot.  Tender over the dorsum of the midfoot.  No ankle effusion.  No crepitus.   Neurological: Alert. Strength and sensation in LLE  normal.   Skin: Skin is warm and dry. No rash noted.   Psychiatric: Normal mood and affect.      Emergency Department Course     Interventions:  (2654) Robaxin, 750 mg, PO    Emergency Department Course:  Nursing notes and vitals reviewed.  I performed an exam of the patient as documented above.   (0186) I went to check in on the patient.   Findings and plan explained to the patient. Patient discharged home with instructions regarding supportive care, medications, and reasons to return. The importance of close follow-up was reviewed. The patient was prescribed Indocin.      Impression & Plan      Medical Decision Making:  Yrn Will is a 57 year old male with the above history including gouty arthritis who presents with atraumatic pain in his left foot. This localized to the dorsum of his midfoot. There is no warmth, erythema, or sign or symptoms concerning for infection. He has pain with range of motion of his ankle joint, but clearly states that his pain localizes to the middle of the foot. I did discuss a detail possibility of septic arthritis (ankle) and performing arthrocentesis, which he is comfortable forgoing at this time. This is unlikely to represent thrombotic disease. He does appear to have some cramping though it seems more pain related than anything. Electrolytes are unlikely to be grossly abnormal in such a grossly local area of pain. Without falls or trauma, it is unlikely there is fractures, dislocations, or any bony abnormality that imaging would help us find. No findings concerning for necrotizing fascitis, deep space tissue abscess, or anything that would require advanced soft tissue imagining such as MRI. At this time will treat him supportively with anti-inflammatories for presumed gout. He will also be given crutches, brace, and follow up instructions if things are worsening.     Diagnosis:    ICD-10-CM    1. Left foot pain M79.672        Disposition:  discharged to home    Discharge  Medications:  Discharge Medication List as of 6/29/2017  5:31 AM      START taking these medications    Details   indomethacin (INDOCIN) 50 MG capsule Take 1 capsule (50 mg) by mouth 3 times daily (with meals) for 10 days, Disp-20 capsule, R-0, Local Print               IChristiano, familia serving as a scribe on 6/29/2017 at 4:22 AM to personally document services performed by Dr. Ruby based on my observations and the provider's statements to me.    [facility] EMERGENCY DEPARTMENT       Robby Ruby MD  06/29/17 0552

## 2017-07-02 ENCOUNTER — TELEPHONE (OUTPATIENT)
Dept: PEDIATRICS | Facility: CLINIC | Age: 57
End: 2017-07-02

## 2017-07-02 DIAGNOSIS — I10 ESSENTIAL HYPERTENSION WITH GOAL BLOOD PRESSURE LESS THAN 140/90: ICD-10-CM

## 2017-07-02 RX ORDER — LISINOPRIL AND HYDROCHLOROTHIAZIDE 12.5; 2 MG/1; MG/1
1 TABLET ORAL DAILY
Qty: 180 TABLET | Refills: 3 | COMMUNITY
Start: 2017-07-02 | End: 2017-10-04

## 2017-07-03 NOTE — TELEPHONE ENCOUNTER
Please call pt  recent labwork shows improvement in renal function:  Lab Results   Component Value Date    CR 1.76 06/28/2017    CR 1.89 06/05/2017        Current Outpatient Prescriptions   Medication Sig Dispense Refill     lisinopril-hydrochlorothiazide (PRINZIDE/ZESTORETIC) 20-12.5 MG per tablet Take 1 tablet by mouth daily 180 tablet 3     indomethacin (INDOCIN) 50 MG capsule Take 1 capsule (50 mg) by mouth 3 times daily (with meals) for 10 days 20 capsule 0     simvastatin (ZOCOR) 40 MG tablet Take 1 tablet (40 mg) by mouth At Bedtime 90 tablet 3             omeprazole (PRILOSEC) 20 MG capsule Take 1 capsule (20 mg) by mouth daily as needed 90 capsule 3     albuterol (PROAIR HFA, PROVENTIL HFA, VENTOLIN HFA) 108 (90 BASE) MCG/ACT inhaler Inhale 2 puffs into the lungs every 4 hours as needed for shortness of breath / dyspnea 2 Inhaler 3     aspirin 325 MG tablet Take  by mouth daily.        Pt had been instructed to reduce lisinopril to once daily rather than twice.  Recent ER notes reviewed--elevated BP noted.  Please pt return for BP follow-up with me at his convenience in next week or 2.    Enrico Reagan MD

## 2017-07-05 ENCOUNTER — TRANSFERRED RECORDS (OUTPATIENT)
Dept: HEALTH INFORMATION MANAGEMENT | Facility: CLINIC | Age: 57
End: 2017-07-05

## 2017-07-07 ENCOUNTER — TRANSFERRED RECORDS (OUTPATIENT)
Dept: HEALTH INFORMATION MANAGEMENT | Facility: CLINIC | Age: 57
End: 2017-07-07

## 2017-08-02 ENCOUNTER — OFFICE VISIT (OUTPATIENT)
Dept: URGENT CARE | Facility: URGENT CARE | Age: 57
End: 2017-08-02
Payer: COMMERCIAL

## 2017-08-02 VITALS
HEART RATE: 76 BPM | BODY MASS INDEX: 33.45 KG/M2 | RESPIRATION RATE: 16 BRPM | SYSTOLIC BLOOD PRESSURE: 120 MMHG | OXYGEN SATURATION: 99 % | DIASTOLIC BLOOD PRESSURE: 80 MMHG | TEMPERATURE: 98.2 F | WEIGHT: 220 LBS

## 2017-08-02 DIAGNOSIS — M10.072 ACUTE IDIOPATHIC GOUT OF LEFT FOOT: Primary | ICD-10-CM

## 2017-08-02 DIAGNOSIS — N18.30 CKD (CHRONIC KIDNEY DISEASE) STAGE 3, GFR 30-59 ML/MIN (H): ICD-10-CM

## 2017-08-02 PROCEDURE — 36415 COLL VENOUS BLD VENIPUNCTURE: CPT | Performed by: PHYSICIAN ASSISTANT

## 2017-08-02 PROCEDURE — 99213 OFFICE O/P EST LOW 20 MIN: CPT | Performed by: PHYSICIAN ASSISTANT

## 2017-08-02 PROCEDURE — 84550 ASSAY OF BLOOD/URIC ACID: CPT | Performed by: PHYSICIAN ASSISTANT

## 2017-08-02 RX ORDER — PREDNISONE 10 MG/1
TABLET ORAL
Qty: 30 TABLET | Refills: 0 | Status: SHIPPED | OUTPATIENT
Start: 2017-08-02 | End: 2017-10-04

## 2017-08-02 RX ORDER — HYDROCODONE BITARTRATE AND ACETAMINOPHEN 5; 325 MG/1; MG/1
1 TABLET ORAL AT BEDTIME
Qty: 3 TABLET | Refills: 0 | Status: SHIPPED | OUTPATIENT
Start: 2017-08-02 | End: 2017-10-04

## 2017-08-02 NOTE — NURSING NOTE
"Yrn Will is a 57 year old male.      Chief Complaint   Patient presents with     Urgent Care     Foot Pain     pt is here for L foot pain - was treated for gout a couple months ago in the same foot       Initial /80 (BP Location: Right arm, Cuff Size: Adult Large)  Pulse 76  Temp 98.2  F (36.8  C) (Oral)  Resp 16  Wt 220 lb (99.8 kg)  SpO2 99%  BMI 33.45 kg/m2 Estimated body mass index is 33.45 kg/(m^2) as calculated from the following:    Height as of 5/31/17: 5' 8\" (1.727 m).    Weight as of this encounter: 220 lb (99.8 kg).  Medication Reconciliation: complete      Questioned patient about current smoking habits.  Pt. has never smoked.      Kath Delvalle CMA      "

## 2017-08-02 NOTE — MR AVS SNAPSHOT
After Visit Summary   8/2/2017    Yrn Will    MRN: [id number]           Patient Information     Date Of Birth          1960        Visit Information        Provider Department      8/2/2017 6:25 PM Regan Forrest PA-C [facility]        Today's Diagnoses     Acute idiopathic gout of left foot    -  1    CKD (chronic kidney disease) stage 3, GFR 30-59 ml/min          Care Instructions      Gout    Gout is an inflammation of a joint due to a build-up of gout crystals in the joint fluid. This occurs when there is an excess of uric acid (a normal waste product) in the body. Uric acid builds up in the body when the kidneys are unable to filter enough of it from the blood. This may occur with age. It is also associated with kidney disease. Gout occurs more often in persons with obesity, diabetes, hypertension, or high levels of fats in the blood. It may be run in families. Gout tends to come and go. A flare up of gout is called an attack. Drinking alcohol or eating certain foods (such as shellfish or foods with additives such as high-fructose corn syrup) may increase uric acid levels in the blood and cause a gout attack.  During a gout attack, the affected joint may become a hot, red, swollen and painful. If you have had one attack of gout, you are likely to have another. An attack of gout can be treated with medicine. If these attacks become frequent, a daily medicine may be prescribed to help the kidneys remove uric acid from the body.  Home care  During a gout attack:    Rest painful joints. If gout affects the joints of your foot or leg, you may want to use crutches for the first few days to keep from bearing weight on the affected joint.    When sitting or lying down, raise the painful joint to a level higher than your heart.    Apply an ice pack (ice cubes in a plastic bag wrapped in a thin towel) over the injured area for 20 minutes every 1-2 hours the  first day for pain relief. Continue this 3-4 times a day for swelling and pain.    Avoid alcohol and foods listed below (see Preventing attacks) during a gout attack. Drink extra fluid to help flush the uric acid through your kidneys.    If you were prescribed a medication to treat gout, take it as your healthcare provider has instructed. Don't skip doses.    Take anti-inflammatory medicine as directed.     If pain medicines have been prescribed, take them exactly as directed.    Preventing attacks    Minimize or avoid alcohol use. Excess alcohol intake can cause a gout attack.    Limit these foods and beverages:    Organ meats, such as kidneys and liver    Certain seafoods (anchovies, sardines, shrimp, scallops, herring, mackerel)    Wild game, meat extracts and meat gravies    Foods and beverages sweetened with high-fructose corn syrup, such as sodas    Eat a healthy diet including low-fat and nonfat dairy, whole grains, and vegetables.    If you are overweight, talk to your healthcare provider about a weight reduction plan. Avoid fasting or extreme low calorie diets (less than 900 calories per day). This will increase uric acid levels in the body.    If you have diabetes or high blood pressure, work with your doctor to manage these conditions.    Protect the joint from injury. Trauma can trigger a gout attack.  Follow-up care  Follow up with your healthcare provider or as advised.   When to seek medical advice  Call your healthcare provider if you have any of the following:    Fever over 100.4 F (38. C) with worsening joint pain    Increasing redness around the joint    Pain developing in another joint    Repeated vomiting, abdominal pain, or blood in the vomit or stool (black or red color)  Date Last Reviewed: 5/14/2015 2000-2017 [employer]. [address] [place] [address]. All rights reserved. This information is not intended as a substitute for professional medical care. Always  follow your healthcare professional's instructions.        Eating to Prevent Gout  Gout is a painful form of arthritis caused by an excess of uric acid. This is a waste product made by the body. It builds up in the body and forms crystals that collect in the joints, bringing on a gout attack. Alcohol and certain foods can trigger a gout attack. Below are some guidelines for changing your diet to help you manage gout. Your healthcare provider can work with you to determine the best eating plan for you. Know that diet is only one part of managing gout. Take your medicines as prescribed and follow the other guidelines your healthcare provider has given you.  Foods to limit  Eating too many foods containing purines may increase the levels of uric acid in your body and increase your risk for a gout attack. It may be best to limit these high-purine foods:    Alcohol (beer, red wine). You may be told to avoid alcohol completely.    Certain fish (anchovies, sardines, fish roes, herring, tuna, mussels, codfish, scallops, trout, and yomi)    Certain meats (red meat, processed meat, horvath, turkey, wild game, and goose)    Sauces and gravies made with meat    Organ meats (such as liver, kidneys, sweetbreads, and tripe)    Legumes (such as dried beans, peas)    Mushrooms, spinach, asparagus, and cauliflower    Yeast and yeast extract supplements  Foods to try  Some foods may be helpful for people with gout. You may want to try adding some of the following foods to your diet:    Dark berries: These include blueberries, blackberries, and cherries. These berries contain chemicals that may lower uric acid.    Tofu: Tofu, which is made from soy, is a good source of protein. Studies have shown that it may be a better choice than meat for people with gout.    Omega fatty acids: These acids are found in fatty fish (such as salmon), certain oils (such as flax, olive, or nut oils), or nuts. They may help prevent inflammation due to  "gout.  The following guidelines are recommended by the American Medical Association for people with gout. Your diet should be:    High in fiber, whole grains, fruits, and vegetables.    Low in protein (15% of calories should come from protein. Choose lean sources such as soy, lean meats, and poultry).    Low in fat (no more than 30% of calories should come from fat, with only 10% coming from animal fat).   Date Last Reviewed: 6/17/2015 2000-2017 [employer]. [address] [place] [address]. All rights reserved. This information is not intended as a substitute for professional medical care. Always follow your healthcare professional's instructions.                Follow-ups after your visit        Who to contact     If you have questions or need follow up information about today's clinic visit or your schedule please contact [facility] directly at [phone].  Normal or non-critical lab and imaging results will be communicated to you by [employer], letter or phone within 4 business days after the clinic has received the results. If you do not hear from us within 7 days, please contact the clinic through [employer] or phone. If you have a critical or abnormal lab result, we will notify you by phone as soon as possible.  Submit refill requests through [employer] or call your pharmacy and they will forward the refill request to us. Please allow 3 business days for your refill to be completed.          Additional Information About Your Visit        [employer] Information     [employer] lets you send messages to your doctor, view your test results, renew your prescriptions, schedule appointments and more. To sign up, go to [email or web address] . Click on \"Log in\" on the left side of the screen, which will take you to the Welcome page. Then click on \"Sign up Now\" on the right side of the page.     You will be asked to enter the access code listed below, as well as some personal " information. Please follow the directions to create your username and password.     Your access code is: [email or web address]  Expires: 2017  5:22 AM     Your access code will  in 90 days. If you need help or a new code, please call your [facility] clinic or [phone].        Care EveryWhere ID     This is your Care EveryWhere ID. This could be used by other organizations to access your [facility] medical records  [id number]        Your Vitals Were     Pulse Temperature Respirations Pulse Oximetry BMI (Body Mass Index)       76 98.2  F (36.8  C) (Oral) 16 99% 33.45 kg/m2        Blood Pressure from Last 3 Encounters:   17 120/80   17 158/90   17 132/84    Weight from Last 3 Encounters:   17 220 lb (99.8 kg)   17 225 lb (102.1 kg)   16 227 lb (103 kg)              We Performed the Following     Uric acid          Today's Medication Changes          These changes are accurate as of: 17  7:28 PM.  If you have any questions, ask your nurse or doctor.               Start taking these medicines.        Dose/Directions    HYDROcodone-acetaminophen 5-325 MG per tablet   Commonly known as:  NORCO   Used for:  Acute idiopathic gout of left foot   Started by:  Regan Forrest PA-C        Dose:  1 tablet   Take 1 tablet by mouth At Bedtime maximum 1 tablet(s) per day   Quantity:  3 tablet   Refills:  0       predniSONE 10 MG tablet   Commonly known as:  DELTASONE   Used for:  Acute idiopathic gout of left foot   Started by:  Regan Forrest PA-C        40 mg qam x 3 days; then 30 mg qam x 3 days; then 20 mg qam x 3 days; then 10 mg qam x 3 days   Quantity:  30 tablet   Refills:  0            Where to get your medicines      Some of these will need a paper prescription and others can be bought over the counter.  Ask your nurse if you have questions.     Bring a paper prescription for each of these medications     HYDROcodone-acetaminophen 5-325 MG per  tablet    predniSONE 10 MG tablet                Primary Care Provider Office Phone # Fax #    Enrico Reagan [phone]       [facility] [address] [place] [address]        Equal Access to Services     ADDISON KAPOOR : Hadmario jose ku jackyo Soha, waaxda luqadaha, qaybta kaalmada adeegyada, oxana tothkayla aguilar. So [facility] [phone].    ATENCIÓN: Si habla español, tiene a bello disposición servicios gratuitos de asistencia lingüística. Llame al [phone].    We comply with applicable federal civil rights laws and [place] laws. We do not discriminate on the basis of race, color, national origin, age, disability sex, sexual orientation or gender identity.            Thank you!     Thank you for choosing [facility]  for your care. Our goal is always to provide you with excellent care. Hearing back from our patients is one way we can continue to improve our services. Please take a few minutes to complete the written survey that you may receive in the mail after your visit with us. Thank you!             Your Updated Medication List - Protect others around you: Learn how to safely use, store and throw away your medicines at www.disposemymeds.org.          This list is accurate as of: 8/2/17  7:28 PM.  Always use your most recent med list.                   Brand Name Dispense Instructions for use Diagnosis    albuterol 108 (90 BASE) MCG/ACT Inhaler    PROAIR HFA/PROVENTIL HFA/VENTOLIN HFA    2 Inhaler    Inhale 2 puffs into the lungs every 4 hours as needed for shortness of breath / dyspnea    Dyspnea       aspirin 325 MG tablet      Take  by mouth daily.        HYDROcodone-acetaminophen 5-325 MG per tablet    NORCO    3 tablet    Take 1 tablet by mouth At Bedtime maximum 1 tablet(s) per day    Acute idiopathic gout of left foot       lisinopril-hydrochlorothiazide 20-12.5 MG per tablet    PRINZIDE/ZESTORETIC    180 tablet    Take 1  tablet by mouth daily    Essential hypertension with goal blood pressure less than 140/90       omeprazole 20 MG CR capsule    priLOSEC    90 capsule    Take 1 capsule (20 mg) by mouth daily as needed    Gastroesophageal reflux disease without esophagitis       predniSONE 10 MG tablet    DELTASONE    30 tablet    40 mg qam x 3 days; then 30 mg qam x 3 days; then 20 mg qam x 3 days; then 10 mg qam x 3 days    Acute idiopathic gout of left foot       simvastatin 40 MG tablet    ZOCOR    90 tablet    Take 1 tablet (40 mg) by mouth At Bedtime    Hyperlipidemia LDL goal <130

## 2017-08-03 ENCOUNTER — TELEPHONE (OUTPATIENT)
Dept: NURSING | Facility: CLINIC | Age: 57
End: 2017-08-03

## 2017-08-03 ENCOUNTER — TELEPHONE (OUTPATIENT)
Dept: URGENT CARE | Facility: URGENT CARE | Age: 57
End: 2017-08-03

## 2017-08-03 ENCOUNTER — NURSE TRIAGE (OUTPATIENT)
Dept: NURSING | Facility: CLINIC | Age: 57
End: 2017-08-03

## 2017-08-03 LAB — URATE SERPL-MCNC: 8.1 MG/DL (ref 3.5–7.2)

## 2017-08-03 NOTE — PATIENT INSTRUCTIONS
Gout    Gout is an inflammation of a joint due to a build-up of gout crystals in the joint fluid. This occurs when there is an excess of uric acid (a normal waste product) in the body. Uric acid builds up in the body when the kidneys are unable to filter enough of it from the blood. This may occur with age. It is also associated with kidney disease. Gout occurs more often in persons with obesity, diabetes, hypertension, or high levels of fats in the blood. It may be run in families. Gout tends to come and go. A flare up of gout is called an attack. Drinking alcohol or eating certain foods (such as shellfish or foods with additives such as high-fructose corn syrup) may increase uric acid levels in the blood and cause a gout attack.  During a gout attack, the affected joint may become a hot, red, swollen and painful. If you have had one attack of gout, you are likely to have another. An attack of gout can be treated with medicine. If these attacks become frequent, a daily medicine may be prescribed to help the kidneys remove uric acid from the body.  Home care  During a gout attack:    Rest painful joints. If gout affects the joints of your foot or leg, you may want to use crutches for the first few days to keep from bearing weight on the affected joint.    When sitting or lying down, raise the painful joint to a level higher than your heart.    Apply an ice pack (ice cubes in a plastic bag wrapped in a thin towel) over the injured area for 20 minutes every 1-2 hours the first day for pain relief. Continue this 3-4 times a day for swelling and pain.    Avoid alcohol and foods listed below (see Preventing attacks) during a gout attack. Drink extra fluid to help flush the uric acid through your kidneys.    If you were prescribed a medication to treat gout, take it as your healthcare provider has instructed. Don't skip doses.    Take anti-inflammatory medicine as directed.     If pain medicines have been prescribed,  take them exactly as directed.    Preventing attacks    Minimize or avoid alcohol use. Excess alcohol intake can cause a gout attack.    Limit these foods and beverages:    Organ meats, such as kidneys and liver    Certain seafoods (anchovies, sardines, shrimp, scallops, herring, mackerel)    Wild game, meat extracts and meat gravies    Foods and beverages sweetened with high-fructose corn syrup, such as sodas    Eat a healthy diet including low-fat and nonfat dairy, whole grains, and vegetables.    If you are overweight, talk to your healthcare provider about a weight reduction plan. Avoid fasting or extreme low calorie diets (less than 900 calories per day). This will increase uric acid levels in the body.    If you have diabetes or high blood pressure, work with your doctor to manage these conditions.    Protect the joint from injury. Trauma can trigger a gout attack.  Follow-up care  Follow up with your healthcare provider or as advised.   When to seek medical advice  Call your healthcare provider if you have any of the following:    Fever over 100.4 F (38. C) with worsening joint pain    Increasing redness around the joint    Pain developing in another joint    Repeated vomiting, abdominal pain, or blood in the vomit or stool (black or red color)  Date Last Reviewed: 5/14/2015 2000-2017 [employer]. [address] [place] [address], [place] [address]. All rights reserved. This information is not intended as a substitute for professional medical care. Always follow your healthcare professional's instructions.        Eating to Prevent Gout  Gout is a painful form of arthritis caused by an excess of uric acid. This is a waste product made by the body. It builds up in the body and forms crystals that collect in the joints, bringing on a gout attack. Alcohol and certain foods can trigger a gout attack. Below are some guidelines for changing your diet to help you manage gout. Your healthcare provider can work  with you to determine the best eating plan for you. Know that diet is only one part of managing gout. Take your medicines as prescribed and follow the other guidelines your healthcare provider has given you.  Foods to limit  Eating too many foods containing purines may increase the levels of uric acid in your body and increase your risk for a gout attack. It may be best to limit these high-purine foods:    Alcohol (beer, red wine). You may be told to avoid alcohol completely.    Certain fish (anchovies, sardines, fish roes, herring, tuna, mussels, codfish, scallops, trout, and yomi)    Certain meats (red meat, processed meat, horvath, turkey, wild game, and goose)    Sauces and gravies made with meat    Organ meats (such as liver, kidneys, sweetbreads, and tripe)    Legumes (such as dried beans, peas)    Mushrooms, spinach, asparagus, and cauliflower    Yeast and yeast extract supplements  Foods to try  Some foods may be helpful for people with gout. You may want to try adding some of the following foods to your diet:    Dark berries: These include blueberries, blackberries, and cherries. These berries contain chemicals that may lower uric acid.    Tofu: Tofu, which is made from soy, is a good source of protein. Studies have shown that it may be a better choice than meat for people with gout.    Omega fatty acids: These acids are found in fatty fish (such as salmon), certain oils (such as flax, olive, or nut oils), or nuts. They may help prevent inflammation due to gout.  The following guidelines are recommended by the American Medical Association for people with gout. Your diet should be:    High in fiber, whole grains, fruits, and vegetables.    Low in protein (15% of calories should come from protein. Choose lean sources such as soy, lean meats, and poultry).    Low in fat (no more than 30% of calories should come from fat, with only 10% coming from animal fat).   Date Last Reviewed: 6/17/2015 2000-2017 The  [employer]. [address] [place] [address], [place] [address]. All rights reserved. This information is not intended as a substitute for professional medical care. Always follow your healthcare professional's instructions.

## 2017-08-03 NOTE — TELEPHONE ENCOUNTER
Clinic Action Needed:  None FYI  FNA Triage Call  Presenting Problem:    Yrn called back and FNA relayed message from REAL Pederson to followup with primary care provider.      Routed to:  RN Pool  Please be sure to close this encounter once this patient's issue/question has been addressed.    Juliana Clark RN/[facility]

## 2017-08-03 NOTE — TELEPHONE ENCOUNTER
Yrn called back and FNA relayed message from REAL Pederson to followup with primary care provider.

## 2017-08-03 NOTE — TELEPHONE ENCOUNTER
Please call patient to let him know his uric acid level is elevated. I see it was also elevated in 2009.     Due to his frequent, recurrent gout episodes and his history of Chronic Kidney Disease, I have recommend he follow-up with PCP to discuss.

## 2017-08-03 NOTE — PROGRESS NOTES
Gout -     Onset: 3 day(s) ago     Description:   Location: left foot  Joint Swelling: YES  Redness: YES  Pain: YES. He is having difficulty sleeping at night due to pain.   History of trauma to the area: no  Fevers: no    History:        Previous history of gout: YES. Patient states last gout flare (6/29/17) was treated with Indomethacin. Patient tells me it took almost a week before any pain relief with last tx. Admits to many, less severe episodes over past several years that he now suspects were gout.   Recent illness:  No   History of trauma to the area:No     Precipitating and/or Alleviating factors:    Diet-rich in: deli meats   Alcohol use: yes   Diuretic use: yes     Progression of Symptoms: (better, worse, same): same   Therapies tried and outcome: anti-inflammatories with minor reliefPast Medical History:   Diagnosis Date     Esophageal reflux 9/16/2009     Gilbert's disease      Gout 9/16/2009     HTN (hypertension) 9/16/2009     Hyperlipidemia LDL goal <130 9/16/2009     Current Outpatient Prescriptions   Medication     predniSONE (DELTASONE) 10 MG tablet     HYDROcodone-acetaminophen (NORCO) 5-325 MG per tablet     lisinopril-hydrochlorothiazide (PRINZIDE/ZESTORETIC) 20-12.5 MG per tablet     simvastatin (ZOCOR) 40 MG tablet     omeprazole (PRILOSEC) 20 MG capsule     albuterol (PROAIR HFA, PROVENTIL HFA, VENTOLIN HFA) 108 (90 BASE) MCG/ACT inhaler     aspirin 325 MG tablet     No current facility-administered medications for this visit.      No Known Allergies        OBJECTIVE:  /80 (BP Location: Right arm, Cuff Size: Adult Large)  Pulse 76  Temp 98.2  F (36.8  C) (Oral)  Resp 16  Wt 220 lb (99.8 kg)  SpO2 99%  BMI 33.45 kg/m2    General appearance: alert and no apparent distress  Skin color is pink and without rash.  CARDIAC:NORMAL - regular rate and rhythm without murmur.  RESP: Normal - CTA without rales, rhonchi, or wheezing.  LEFT FOOT: Pain, mild swelling and redness dorsum left  foot. Remainder of left foot exam unremarkable.   NEURO: Alert and oriented.  Normal speech and mentation.  CN II/XII grossly intact.  Gait within normal limits.        ASSESSMENT/PLAN:      (M10.072) Acute idiopathic gout of left foot  (primary encounter diagnosis)  Comment: Recurrent gout episodes in a patient with relative contraindication to use of NSAID's due to CKD.   Plan: Uric acid, predniSONE (DELTASONE) 10 MG tablet,        HYDROcodone-acetaminophen (NORCO) 5-325 MG per         tablet    1. Prednisone taper as per tx   2. Vicodin prn qhs for the next couple of nights (total # 3 dispensed). Advised he may not drive while taking this medication and somnolent SE reviewed.   3. Uric acid level pending from today's visit   4. Advised to follow-up with PCP to discussed recurrent gout episodes  5. Follow-up with PCP if sxs change, worsen or fail to fully resolve with above tx.      (N18.3) CKD (chronic kidney disease) stage 3, GFR 30-59 ml/min

## 2017-08-03 NOTE — TELEPHONE ENCOUNTER
Patient received providers message and will follow up with provider.  Monica Garcia CMA (AAMA) 8/3/2017 3:03 PM

## 2017-08-07 DIAGNOSIS — I10 ESSENTIAL HYPERTENSION WITH GOAL BLOOD PRESSURE LESS THAN 140/90: ICD-10-CM

## 2017-08-07 DIAGNOSIS — K21.9 GASTROESOPHAGEAL REFLUX DISEASE WITHOUT ESOPHAGITIS: ICD-10-CM

## 2017-08-07 NOTE — TELEPHONE ENCOUNTER
omeprazole (PRILOSEC) 20 MG capsule      Last Written Prescription Date: 5/25/2016  Last Fill Quantity: 90,  # refills: 3   Last Office Visit with [facility], [facility] or [facility] prescribing provider: 5/31/2017                                             Duplicate.     lisinopril-hydrochlorothiazide (PRINZIDE/ZESTORETIC) 20-12.5 MG per tablet was filled on 7/2/2017, qty 180 with 3 refills.

## 2017-08-08 RX ORDER — HYDROCHLOROTHIAZIDE 25 MG/1
TABLET ORAL
Qty: 90 TABLET | Refills: 0 | OUTPATIENT
Start: 2017-08-08

## 2017-08-08 RX ORDER — LISINOPRIL 20 MG/1
20 TABLET ORAL 2 TIMES DAILY
Qty: 180 TABLET | Refills: 0 | OUTPATIENT
Start: 2017-08-08

## 2017-08-08 NOTE — TELEPHONE ENCOUNTER
Prilosec:  Prescription approved per [facility] Refill Protocol.    Prinizide: separate rx's  Routing refill request to provider for review/approval because:  Medication is reported/historical Please sign if ok.   Alexandra Valladares, MIRANDA  Triage Nurse

## 2017-08-08 NOTE — TELEPHONE ENCOUNTER
Lisinopril and HCTZ were combined to Prinzide and current regimen is 20-12.5mg tab once daily  Dose was reduced from twice daily due to rising creatinine--recent elevated BP at Er visit  Please have pt rtc for BP recheck

## 2017-10-03 ENCOUNTER — ALLIED HEALTH/NURSE VISIT (OUTPATIENT)
Dept: PEDIATRICS | Facility: CLINIC | Age: 57
End: 2017-10-03
Payer: COMMERCIAL

## 2017-10-03 VITALS — DIASTOLIC BLOOD PRESSURE: 84 MMHG | SYSTOLIC BLOOD PRESSURE: 124 MMHG

## 2017-10-03 DIAGNOSIS — I10 ESSENTIAL HYPERTENSION WITH GOAL BLOOD PRESSURE LESS THAN 140/90: Primary | ICD-10-CM

## 2017-10-03 PROCEDURE — 99207 ZZC NO CHARGE NURSE ONLY: CPT | Performed by: INTERNAL MEDICINE

## 2017-10-03 NOTE — MR AVS SNAPSHOT
"              After Visit Summary   10/3/2017    Yrn Will    MRN: [id number]           Patient Information     Date Of Birth          1960        Visit Information        Provider Department      10/3/2017 4:39 PM Enrico Reagan MD [facility]        Today's Diagnoses     Essential hypertension with goal blood pressure less than 140/90    -  1       Follow-ups after your visit        Your next 10 appointments already scheduled     Oct 04, 2017  8:00 AM CDT   PHYSICAL with Enrico Reagan MD   [facility]    [address] [place] [address]  [address]  [facility] [address]   [phone]              Who to contact     If you have questions or need follow up information about today's clinic visit or your schedule please contact [facility] directly at [phone].  Normal or non-critical lab and imaging results will be communicated to you by MyChart, letter or phone within 4 business days after the clinic has received the results. If you do not hear from us within 7 days, please contact the clinic through MyChart or phone. If you have a critical or abnormal lab result, we will notify you by phone as soon as possible.  Submit refill requests through Nitro or call your pharmacy and they will forward the refill request to us. Please allow 3 business days for your refill to be completed.          Additional Information About Your Visit        MyChart Information     Nitro lets you send messages to your doctor, view your test results, renew your prescriptions, schedule appointments and more. To sign up, go to [email or web address] . Click on \"Log in\" on the left side of the screen, which will take you to the Welcome page. Then click on \"Sign up Now\" on the right side of the page.     You will be asked to enter the access code listed below, as well as some personal information. Please follow the directions to create your username and " password.     Your access code is: [email or web address]  Expires: 2018  4:40 PM     Your access code will  in 90 days. If you need help or a new code, please call your [facility] clinic or [phone].        Care EveryWhere ID     This is your Care EveryWhere ID. This could be used by other organizations to access your [facility] medical records  [id number]         Blood Pressure from Last 3 Encounters:   10/03/17 124/84   17 120/80   17 158/90    Weight from Last 3 Encounters:   17 220 lb (99.8 kg)   17 225 lb (102.1 kg)   16 227 lb (103 kg)              Today, you had the following     No orders found for display       Primary Care Provider Office Phone # Fax #    Enrico Reagan [phone] [address] [place] [address]        Equal Access to Services     [facility] AH: Hadii aad ku hadasho Soomaali, waaxda luqadaha, qaybta kaalmada adeegyada, waxay idiin hayaan sandraeg kharaoscar la'estebann . So [facility] [phone].    ATENCIÓN: Si habla español, tiene a bello disposición servicios gratuitos de asistencia lingüística. Llame al [phone].    We comply with applicable federal civil rights laws and [place] laws. We do not discriminate on the basis of race, color, national origin, age, disability, sex, sexual orientation, or gender identity.            Thank you!     Thank you for choosing [facility]  for your care. Our goal is always to provide you with excellent care. Hearing back from our patients is one way we can continue to improve our services. Please take a few minutes to complete the written survey that you may receive in the mail after your visit with us. Thank you!             Your Updated Medication List - Protect others around you: Learn how to safely use, store and throw away your medicines at www.disposemymeds.org.          This list is accurate as of: 10/3/17  4:40 PM.  Always use your most recent med list.                    Brand Name Dispense Instructions for use Diagnosis    albuterol 108 (90 BASE) MCG/ACT Inhaler    PROAIR HFA/PROVENTIL HFA/VENTOLIN HFA    2 Inhaler    Inhale 2 puffs into the lungs every 4 hours as needed for shortness of breath / dyspnea    Dyspnea       aspirin 325 MG tablet      Take  by mouth daily.        HYDROcodone-acetaminophen 5-325 MG per tablet    NORCO    3 tablet    Take 1 tablet by mouth At Bedtime maximum 1 tablet(s) per day    Acute idiopathic gout of left foot       lisinopril-hydrochlorothiazide 20-12.5 MG per tablet    PRINZIDE/ZESTORETIC    180 tablet    Take 1 tablet by mouth daily    Essential hypertension with goal blood pressure less than 140/90       omeprazole 20 MG CR capsule    priLOSEC    90 capsule    TAKE 1 CAPSULE DAILY AS NEEDED    Gastroesophageal reflux disease without esophagitis       predniSONE 10 MG tablet    DELTASONE    30 tablet    40 mg qam x 3 days; then 30 mg qam x 3 days; then 20 mg qam x 3 days; then 10 mg qam x 3 days    Acute idiopathic gout of left foot       simvastatin 40 MG tablet    ZOCOR    90 tablet    Take 1 tablet (40 mg) by mouth At Bedtime    Hyperlipidemia LDL goal <130

## 2017-10-04 ENCOUNTER — OFFICE VISIT (OUTPATIENT)
Dept: PEDIATRICS | Facility: CLINIC | Age: 57
End: 2017-10-04
Payer: COMMERCIAL

## 2017-10-04 VITALS
WEIGHT: 223 LBS | HEART RATE: 72 BPM | SYSTOLIC BLOOD PRESSURE: 114 MMHG | DIASTOLIC BLOOD PRESSURE: 70 MMHG | HEIGHT: 68 IN | BODY MASS INDEX: 33.8 KG/M2 | TEMPERATURE: 98.2 F

## 2017-10-04 DIAGNOSIS — Z12.11 SCREEN FOR COLON CANCER: ICD-10-CM

## 2017-10-04 DIAGNOSIS — R73.03 PREDIABETES: ICD-10-CM

## 2017-10-04 DIAGNOSIS — R97.20 ELEVATED PROSTATE SPECIFIC ANTIGEN (PSA): ICD-10-CM

## 2017-10-04 DIAGNOSIS — N18.30 CKD (CHRONIC KIDNEY DISEASE) STAGE 3, GFR 30-59 ML/MIN (H): ICD-10-CM

## 2017-10-04 DIAGNOSIS — Z00.01 ENCOUNTER FOR ROUTINE ADULT HEALTH EXAMINATION WITH ABNORMAL FINDINGS: Primary | ICD-10-CM

## 2017-10-04 DIAGNOSIS — N40.1 BENIGN NON-NODULAR PROSTATIC HYPERPLASIA WITH LOWER URINARY TRACT SYMPTOMS: ICD-10-CM

## 2017-10-04 DIAGNOSIS — E78.5 HYPERLIPIDEMIA LDL GOAL <130: ICD-10-CM

## 2017-10-04 DIAGNOSIS — I10 ESSENTIAL HYPERTENSION WITH GOAL BLOOD PRESSURE LESS THAN 140/90: ICD-10-CM

## 2017-10-04 LAB
ALBUMIN SERPL-MCNC: 4.3 G/DL (ref 3.4–5)
ALP SERPL-CCNC: 91 U/L (ref 40–150)
ALT SERPL W P-5'-P-CCNC: 42 U/L (ref 0–70)
ANION GAP SERPL CALCULATED.3IONS-SCNC: 12 MMOL/L (ref 3–14)
AST SERPL W P-5'-P-CCNC: 32 U/L (ref 0–45)
BILIRUB SERPL-MCNC: 1.5 MG/DL (ref 0.2–1.3)
BUN SERPL-MCNC: 19 MG/DL (ref 7–30)
CALCIUM SERPL-MCNC: 9.8 MG/DL (ref 8.5–10.1)
CHLORIDE SERPL-SCNC: 103 MMOL/L (ref 94–109)
CHOLEST SERPL-MCNC: 197 MG/DL
CO2 SERPL-SCNC: 24 MMOL/L (ref 20–32)
CREAT SERPL-MCNC: 1.75 MG/DL (ref 0.66–1.25)
GFR SERPL CREATININE-BSD FRML MDRD: 40 ML/MIN/1.7M2
GLUCOSE SERPL-MCNC: 102 MG/DL (ref 70–99)
HDLC SERPL-MCNC: 52 MG/DL
LDLC SERPL CALC-MCNC: 107 MG/DL
NONHDLC SERPL-MCNC: 145 MG/DL
POTASSIUM SERPL-SCNC: 4.1 MMOL/L (ref 3.4–5.3)
PROT SERPL-MCNC: 7.9 G/DL (ref 6.8–8.8)
PSA SERPL-ACNC: 8.73 UG/L (ref 0–4)
SODIUM SERPL-SCNC: 139 MMOL/L (ref 133–144)
TRIGL SERPL-MCNC: 191 MG/DL

## 2017-10-04 PROCEDURE — 80061 LIPID PANEL: CPT | Performed by: INTERNAL MEDICINE

## 2017-10-04 PROCEDURE — 36415 COLL VENOUS BLD VENIPUNCTURE: CPT | Performed by: INTERNAL MEDICINE

## 2017-10-04 PROCEDURE — G0103 PSA SCREENING: HCPCS | Performed by: INTERNAL MEDICINE

## 2017-10-04 PROCEDURE — 99396 PREV VISIT EST AGE 40-64: CPT | Performed by: INTERNAL MEDICINE

## 2017-10-04 PROCEDURE — 80053 COMPREHEN METABOLIC PANEL: CPT | Performed by: INTERNAL MEDICINE

## 2017-10-04 RX ORDER — LISINOPRIL AND HYDROCHLOROTHIAZIDE 12.5; 2 MG/1; MG/1
1 TABLET ORAL DAILY
Qty: 180 TABLET | Refills: 3 | Status: SHIPPED | OUTPATIENT
Start: 2017-10-04 | End: 2018-01-18

## 2017-10-04 NOTE — PROGRESS NOTES
SUBJECTIVE:   CC: Yrn Will is an 57 year old male who presents for preventative health visit.     Physical   Annual:     Getting at least 3 servings of Calcium per day::  Yes    Bi-annual eye exam::  Yes    Dental care twice a year::  Yes    Sleep apnea or symptoms of sleep apnea::  None    Diet::  Regular (no restrictions)    Taking medications regularly::  Yes    Medication side effects::  None    Additional concerns today::  No      Today's PHQ-2 Score:   PHQ-2 ( 1999 [employer]) 10/4/2017   Q1: Little interest or pleasure in doing things 0   Q2: Feeling down, depressed or hopeless 0   PHQ-2 Score 0   Q1: Little interest or pleasure in doing things Not at all   Q2: Feeling down, depressed or hopeless Not at all   PHQ-2 Score 0       Abuse: Current or Past(Physical, Sexual or Emotional)- No  Do you feel safe in your environment - Yes    Social History   Substance Use Topics     Smoking status: Never Smoker     Smokeless tobacco: Never Used     Alcohol use No     The patient does not drink >3 drinks per day nor >7 drinks per week.    Last PSA:   PSA   Date Value Ref Range Status   05/31/2017 8.47 (H) 0 - 4 ug/L Final     Comment:     Assay Method:  Chemiluminescence using Siemens Vista analyzer       Reviewed orders with patient. Reviewed health maintenance and updated orders accordingly -       Reviewed and updated as needed this visit by clinical staff  Tobacco  Allergies  Meds         Reviewed and updated as needed this visit by Provider  Meds        Patient Active Problem List   Diagnosis     Hyperlipidemia LDL goal <130     Hepatic steatosis     Gilbert's disease     Prediabetes     Advanced directives, counseling/discussion     CKD (chronic kidney disease) stage 3, GFR 30-59 ml/min     Essential hypertension with goal blood pressure less than 140/90     Gout, unspecified cause, unspecified chronicity, unspecified site     Gastroesophageal reflux disease without esophagitis     Obesity, unspecified  "obesity severity, unspecified obesity type     Benign non-nodular prostatic hyperplasia with lower urinary tract symptoms     Elevated prostate specific antigen (PSA)     Past Medical History:   Diagnosis Date     Esophageal reflux 9/16/2009     Gilbert's disease      Gout 9/16/2009     HTN (hypertension) 9/16/2009     Hyperlipidemia LDL goal <130 9/16/2009       Past Surgical History:   Procedure Laterality Date     BIOPSY PROSTATE TRANSRECTAL  2017    bx showed BPH, negative for prostate cancer     LASIK  2006     repair right hydronephrosis  Age 14       Family History   Problem Relation Age of Onset     C.A.D. Brother      stent, age 56     C.A.D. Maternal Uncle      MI in 50's     Hypertension Father      Hypertension Mother      Cancer - colorectal No family hx of      Prostate Cancer No family hx of        ALLERGIES   No Known Allergies      ROS:  C: NEGATIVE for fever, chills, change in weight  I: NEGATIVE for worrisome rashes, moles or lesions  E: NEGATIVE for vision changes or irritation  ENT: NEGATIVE for ear, mouth and throat problems  R: NEGATIVE for significant cough or SOB  CV: NEGATIVE for chest pain, palpitations or peripheral edema  GI: NEGATIVE for nausea, abdominal pain, heartburn, or change in bowel habits   male: negative for dysuria, hematuria, decreased urinary stream, erectile dysfunction, urethral discharge  M: NEGATIVE for significant arthralgias or myalgia  N: NEGATIVE for weakness, dizziness or paresthesias  P: NEGATIVE for changes in mood or affect    OBJECTIVE:   /70 (Cuff Size: Adult Large)  Pulse 72  Temp 98.2  F (36.8  C) (Oral)  Ht 5' 8\" (1.727 m)  Wt 223 lb (101.2 kg)  BMI 33.91 kg/m2    EXAM:  GENERAL: alert and no distress  EYES: Eyes grossly normal to inspection, PERRL and conjunctivae and sclerae normal  HENT: ear canals and TM's normal, nose and mouth without ulcers or lesions  NECK: no adenopathy, no asymmetry, masses, or scars and thyroid normal to " palpation  RESP: lungs clear to auscultation - no rales, rhonchi or wheezes  CV: regular rate and rhythm, normal S1 S2, no S3 or S4, no murmur, click or rub, no peripheral edema and peripheral pulses strong  ABDOMEN: soft, nontender, no hepatosplenomegaly, no masses and bowel sounds normal  MS: no gross musculoskeletal defects noted, no edema  SKIN: no suspicious lesions or rashes, scattered SK lesions upper back  NEURO: Normal strength and tone, mentation intact and speech normal  PSYCH: mentation appears normal, affect normal/bright    ASSESSMENT/PLAN:       ICD-10-CM    1. Encounter for routine adult health examination with abnormal findings Z00.01        2. Prediabetes R73.03 Discussed diet/weight mgmt     3. Essential hypertension with goal blood pressure less than 140/90 I10 lisinopril-hydrochlorothiazide (PRINZIDE/ZESTORETIC) 20-12.5 MG per tablet    Adequate control.  Continue current regimen without changes.      4. Hyperlipidemia LDL goal <130 E78.5 Lipid panel reflex to direct LDL     Comprehensive metabolic panel (BMP + Alb, Alk Phos, ALT, AST, Total. Bili, TP)      Continue simvastatin.  Diet issues reviewed (reducing red /processed meat intake)     5. CKD (chronic kidney disease) stage 3, GFR 30-59 ml/min N18.3 Continue ACEI, rpt BMP       6. Elevated prostate specific antigen (PSA) R97.20 PSA, screen   7. Benign non-nodular prostatic hyperplasia with lower urinary tract symptoms N40.1     Prior [facility] work-up in past few months.  Negative biopsies--consistent with BPH     8. Screen for colon cancer Z12.11 Fecal colorectal cancer screen (FIT)       COUNSELING:   Reviewed preventive health counseling, as reflected in patient instructions       Regular exercise       Healthy diet/nutrition       Colon cancer screening       Prostate cancer screening           reports that he has never smoked. He has never used smokeless tobacco.      Estimated body mass index is 33.91 kg/(m^2) as calculated from the  "following:    Height as of this encounter: 5' 8\" (1.727 m).    Weight as of this encounter: 223 lb (101.2 kg).   Weight management plan: Discussed healthy diet and exercise guidelines and patient will follow up in 12 months in clinic to re-evaluate.    Counseling Resources:  ATP IV Guidelines  Pooled Cohorts Equation Calculator  FRAX Risk Assessment  ICSI Preventive Guidelines  Dietary Guidelines for Americans, 2010  USDA's MyPlate  ASA Prophylaxis  Lung CA Screening    Enrico Reagan MD, MD  [facility]  "

## 2017-10-04 NOTE — NURSING NOTE
"Chief Complaint   Patient presents with     Physical       Initial /70 (Cuff Size: Adult Large)  Pulse 72  Temp 98.2  F (36.8  C) (Oral)  Ht 5' 8\" (1.727 m)  Wt 223 lb (101.2 kg)  BMI 33.91 kg/m2 Estimated body mass index is 33.91 kg/(m^2) as calculated from the following:    Height as of this encounter: 5' 8\" (1.727 m).    Weight as of this encounter: 223 lb (101.2 kg).  Medication Reconciliation: capri Mann CMA    "

## 2017-10-04 NOTE — MR AVS SNAPSHOT
After Visit Summary   10/4/2017    Yrn Will    MRN: [id number]           Patient Information     Date Of Birth          1960        Visit Information        Provider Department      10/4/2017 8:00 AM Enrico Reagan MD [facility]        Today's Diagnoses     Encounter for routine adult health examination with abnormal findings    -  1    Essential hypertension with goal blood pressure less than 140/90        Hyperlipidemia LDL goal <130        Prediabetes        Benign non-nodular prostatic hyperplasia with lower urinary tract symptoms        Elevated prostate specific antigen (PSA)        CKD (chronic kidney disease) stage 3, GFR 30-59 ml/min          Care Instructions      Preventive Health Recommendations  Male Ages 50 - 64    Yearly exam:             See your health care provider every year in order to  o   Review health changes.   o   Discuss preventive care.    o   Review your medicines if your doctor has prescribed any.     Have a cholesterol test every 5 years, or more frequently if you are at risk for high cholesterol/heart disease.     Have a diabetes test (fasting glucose) every three years. If you are at risk for diabetes, you should have this test more often.     Have a colonoscopy at age 50, or have a yearly FIT test (stool test). These exams will check for colon cancer.      Talk with your health care provider about whether or not a prostate cancer screening test (PSA) is right for you.    You should be tested each year for STDs (sexually transmitted diseases), if you re at risk.     Shots: Get a flu shot each year. Get a tetanus shot every 10 years.     Nutrition:    Eat at least 5 servings of fruits and vegetables daily.     Eat whole-grain bread, whole-wheat pasta and brown rice instead of white grains and rice.     Talk to your provider about Calcium and Vitamin D.     Lifestyle    Exercise for at least 150 minutes a week (30 minutes a day, 5 days a week).  "This will help you control your weight and prevent disease.     Limit alcohol to one drink per day.     No smoking.     Wear sunscreen to prevent skin cancer.     See your dentist every six months for an exam and cleaning.     See your eye doctor every 1 to 2 years.            Follow-ups after your visit        Who to contact     If you have questions or need follow up information about today's clinic visit or your schedule please contact [facility] directly at [phone].  Normal or non-critical lab and imaging results will be communicated to you by Itarohart, letter or phone within 4 business days after the clinic has received the results. If you do not hear from us within 7 days, please contact the clinic through Itarohart or phone. If you have a critical or abnormal lab result, we will notify you by phone as soon as possible.  Submit refill requests through Abaxia or call your pharmacy and they will forward the refill request to us. Please allow 3 business days for your refill to be completed.          Additional Information About Your Visit        Abaxia Information     Abaxia lets you send messages to your doctor, view your test results, renew your prescriptions, schedule appointments and more. To sign up, go to [email or web address] . Click on \"Log in\" on the left side of the screen, which will take you to the Welcome page. Then click on \"Sign up Now\" on the right side of the page.     You will be asked to enter the access code listed below, as well as some personal information. Please follow the directions to create your username and password.     Your access code is: [email or web address]  Expires: 2018  4:40 PM     Your access code will  in 90 days. If you need help or a new code, please call your [facility] clinic or [phone].        Care EveryWhere ID     This is your Care EveryWhere ID. This could be used by other organizations to access your [facility] medical records  [id number]      " "  Your Vitals Were     Pulse Temperature Height BMI (Body Mass Index)          72 98.2  F (36.8  C) (Oral) 5' 8\" (1.727 m) 33.91 kg/m2         Blood Pressure from Last 3 Encounters:   10/04/17 114/70   10/03/17 124/84   08/02/17 120/80    Weight from Last 3 Encounters:   10/04/17 223 lb (101.2 kg)   08/02/17 220 lb (99.8 kg)   05/31/17 225 lb (102.1 kg)              We Performed the Following     Comprehensive metabolic panel (BMP + Alb, Alk Phos, ALT, AST, Total. Bili, TP)     Lipid panel reflex to direct LDL     PSA, screen          Today's Medication Changes          These changes are accurate as of: 10/4/17  8:36 AM.  If you have any questions, ask your nurse or doctor.               Stop taking these medicines if you haven't already. Please contact your care team if you have questions.     albuterol 108 (90 BASE) MCG/ACT Inhaler   Commonly known as:  PROAIR HFA/PROVENTIL HFA/VENTOLIN HFA   Stopped by:  Enrico Reagan MD           HYDROcodone-acetaminophen 5-325 MG per tablet   Commonly known as:  NORCO   Stopped by:  Enrico Reagan MD           predniSONE 10 MG tablet   Commonly known as:  DELTASONE   Stopped by:  Enrico Reagan MD                Where to get your medicines      These medications were sent to [facility] [address] [address]     Phone:  [phone]     lisinopril-hydrochlorothiazide 20-12.5 MG per tablet                Primary Care Provider Office Phone # Fax #    Enrico Reagan [phone]       [facility] [place] [address]        Equal Access to Services     [facility]APOOR AH: Milka Batres, steph salinas, virgen petersonalgina foster, oxana aguilar. [facility] [phone].    ATENCIÓN: Si habla español, tiene a bello disposición servicios gratuitos de asistencia lingüística. Llame al [phone].    We comply with " applicable federal civil rights laws and [place] laws. We do not discriminate on the basis of race, color, national origin, age, disability, sex, sexual orientation, or gender identity.            Thank you!     Thank you for choosing [facility]  for your care. Our goal is always to provide you with excellent care. Hearing back from our patients is one way we can continue to improve our services. Please take a few minutes to complete the written survey that you may receive in the mail after your visit with us. Thank you!             Your Updated Medication List - Protect others around you: Learn how to safely use, store and throw away your medicines at www.disposemymeds.org.          This list is accurate as of: 10/4/17  8:36 AM.  Always use your most recent med list.                   Brand Name Dispense Instructions for use Diagnosis    aspirin 325 MG tablet      Take  by mouth daily.        lisinopril-hydrochlorothiazide 20-12.5 MG per tablet    PRINZIDE/ZESTORETIC    180 tablet    Take 1 tablet by mouth daily    Essential hypertension with goal blood pressure less than 140/90       omeprazole 20 MG CR capsule    priLOSEC    90 capsule    TAKE 1 CAPSULE DAILY AS NEEDED    Gastroesophageal reflux disease without esophagitis       simvastatin 40 MG tablet    ZOCOR    90 tablet    Take 1 tablet (40 mg) by mouth At Bedtime    Hyperlipidemia LDL goal <130

## 2017-10-15 ENCOUNTER — TELEPHONE (OUTPATIENT)
Dept: PEDIATRICS | Facility: CLINIC | Age: 57
End: 2017-10-15

## 2017-10-15 NOTE — TELEPHONE ENCOUNTER
Left message to call clinic regarding recent labwork  PSA continues to trend higher--pt states he has had prior bx however we don't appear to have urology records

## 2017-10-18 NOTE — TELEPHONE ENCOUNTER
JORGE-Dr. Reagan recommends patient follow-up with Urology as PSA continues to increase.  I spoke with patient and he states that he faxed us information regarding the Urologist he sees at [place].  Had biopsy done in June 2017 at [place] and would like results faxed to [place].  He was unable to remember provider name.  Advised him that Dr. Reagan recommends he follow-up with Urology.  Would like us to send labs to provider first.  JEANIE Coreas RN

## 2017-10-24 DIAGNOSIS — Z12.11 SCREEN FOR COLON CANCER: ICD-10-CM

## 2017-10-24 LAB — HEMOCCULT STL QL IA: NEGATIVE

## 2017-10-24 PROCEDURE — 82274 ASSAY TEST FOR BLOOD FECAL: CPT | Performed by: INTERNAL MEDICINE

## 2017-11-03 ENCOUNTER — TRANSFERRED RECORDS (OUTPATIENT)
Dept: HEALTH INFORMATION MANAGEMENT | Facility: CLINIC | Age: 57
End: 2017-11-03

## 2017-11-10 ENCOUNTER — TRANSFERRED RECORDS (OUTPATIENT)
Dept: HEALTH INFORMATION MANAGEMENT | Facility: CLINIC | Age: 57
End: 2017-11-10

## 2017-11-10 ENCOUNTER — TELEPHONE (OUTPATIENT)
Dept: PEDIATRICS | Facility: CLINIC | Age: 57
End: 2017-11-10

## 2017-11-10 NOTE — TELEPHONE ENCOUNTER
Received a call from [facility] surgery scheduler and they are trying to get a patient certified for surgery. They need recent labs and EKG and Stress Echo faxed. They are faxing a release. This was done. Faxed to [phone] Their phone is [phone]  Charisse Davidson RN

## 2017-11-15 ENCOUNTER — OFFICE VISIT (OUTPATIENT)
Dept: PEDIATRICS | Facility: CLINIC | Age: 57
End: 2017-11-15
Payer: COMMERCIAL

## 2017-11-15 ENCOUNTER — HOSPITAL ENCOUNTER (OUTPATIENT)
Dept: ULTRASOUND IMAGING | Facility: CLINIC | Age: 57
Discharge: HOME | End: 2017-11-15
Attending: PHYSICIAN ASSISTANT | Admitting: PHYSICIAN ASSISTANT
Payer: COMMERCIAL

## 2017-11-15 VITALS
TEMPERATURE: 98 F | SYSTOLIC BLOOD PRESSURE: 116 MMHG | OXYGEN SATURATION: 97 % | HEIGHT: 68 IN | HEART RATE: 75 BPM | DIASTOLIC BLOOD PRESSURE: 82 MMHG

## 2017-11-15 DIAGNOSIS — I80.8 SUPERFICIAL THROMBOPHLEBITIS OF LEFT UPPER EXTREMITY: Primary | ICD-10-CM

## 2017-11-15 DIAGNOSIS — I80.8 SUPERFICIAL THROMBOPHLEBITIS OF LEFT UPPER EXTREMITY: ICD-10-CM

## 2017-11-15 LAB — WBC # BLD AUTO: 9.5 10E9/L (ref 4–11)

## 2017-11-15 PROCEDURE — 99214 OFFICE O/P EST MOD 30 MIN: CPT | Performed by: PHYSICIAN ASSISTANT

## 2017-11-15 PROCEDURE — 85048 AUTOMATED LEUKOCYTE COUNT: CPT | Performed by: PHYSICIAN ASSISTANT

## 2017-11-15 PROCEDURE — 36415 COLL VENOUS BLD VENIPUNCTURE: CPT | Performed by: PHYSICIAN ASSISTANT

## 2017-11-15 PROCEDURE — 93971 EXTREMITY STUDY: CPT | Mod: LT

## 2017-11-15 NOTE — PROGRESS NOTES
"  SUBJECTIVE:   Yrn Will is a 57 year old male who presents to clinic today for the following health issues:      Concern - Infection at IV site    MRI Friday at [facility] for 2nd prostate biopsy    Onset: 5 days     Description:   Upper left inner arm pain and swelling    Intensity: moderate    Progression of Symptoms:  worsening    Accompanying Signs & Symptoms:  Swelling, redness, tenderness and streaking, burning sensation     Previous history of similar problem:   None     Precipitating factors:   Worsened by: touch or bending arm    Alleviating factors:  Improved by: none     No numbness or tingling.     Therapies Tried and outcome: none     ROS:  10 point ROS negative except as listed above      OBJECTIVE:     /82 (BP Location: Right arm, Patient Position: Chair, Cuff Size: Adult Large)  Pulse 75  Temp 98  F (36.7  C) (Oral)  Ht 5' 8\" (1.727 m)  SpO2 97%  There is no height or weight on file to calculate BMI.  GENERAL: healthy, alert and no distress  MS: mild redness, mild inflammation proximal to site, tender to palpation to mid humerus.  RESP: lungs clear to auscultation  CV: regular rate and rhythm    Results for orders placed or performed in visit on 11/15/17   WBC count   Result Value Ref Range    WBC 9.5 4.0 - 11.0 10e9/L     US: Basilic approaching deep venous system    ASSESSMENT/PLAN:     (I80.8) Superficial thrombophlebitis of left upper extremity  (primary encounter diagnosis)  Comment: complicated by CKD, low dose NSAIDs used for anti-inflammation.  US indicates superficial approaching deep venous system, WBC indicate unlikely infection  Plan: WBC count, US Upper Extremity Venous Duplex         Left  400 mg Ibuprofen daily for 1-2 weeks (3 days beyond resolution of symptoms)   Follow up with PCP if symptoms persist  Red flags and emergent follow up discussed, and understood by patient    Patient Instructions     Tylenol, warm and/or cool compresses, rest, elevation  Follow up next week " if still bothersome  REturn sooner with new or worsening symptoms      Superficial Thrombophlebitis   The superficial veins are the veins near the surface of the skin. Superficial thrombophlebitis is a problem that occurs when one or more of these veins become inflamed (red, irritated, and swollen). This is most often because of a blood clot.  Causes  The problem may occur after injury to a vein. It may also occur after having an intravenous (IV) line placed. Other factors that can make the problem more likely include:    Varicose veins    Venous insufficiency    Bleeding disorders    Prolonged periods of rest and not moving around    IV drug abuse  Symptoms  Symptoms may appear in the affected area. They can include:    Pain    Tenderness    Redness    Warmth    Swelling    Hardening of the vein  In most cases, superficial thrombophlebitis resolves on its own with no problems. Treatment is focused on relieving symptoms.  Sometimes, there is a risk that the deep veins in the body may also be involved. This can lead to more serious problems. In such cases, further testing and treatments may be needed. Your healthcare provider can tell you more about this.  Home Care  To help relieve pain and swelling, you may be told to:    Apply heat or cold to the affected area. Do this for up to 10 minutes as often as directed.    Heat: Use a warm compress, such as a heating pad.    Cold: Use a cold compress, such as a cold pack or bag of ice wrapped in a thin towel.    Take nonsteroidal anti-inflammatory drugs (NSAIDS), such as ibuprofen. In some cases, other pain medicines may be prescribed.    Keep the affected limb (arm or leg) raised above heart level as directed.    Wear elastic compression stockings or bandages as directed.    Avoid prolonged sitting or standing. Get up and walk often.  To help treat a blood clot, a blood thinner (anticoagulant) may be prescribed. If this is needed, be sure to take the medicine exactly as  directed.  Follow-up care  Follow up with your healthcare provider as advised. If imaging tests are done, they will be reviewed by a doctor. You ll be told the results and any new findings that may affect your treatment.  When to seek medical advice  Call your healthcare provider right away if any of these occur:    Fever of 100.4 F (38 C) or higher, or as directed by your provider    Increasing pain, swelling, or tenderness in the affected area    Spreading warmth or redness in the affected area  Call 911  Call 911 right away if any of these occur:    Trouble breathing    Chest pain or discomfort that worsens with deep breathing or coughing    Coughing (may cough up blood)    Fast or irregular heartbeat    Sweating    Anxiety    Lightheadedness, dizziness, or fainting    Extreme confusion    Extreme drowsiness or trouble waking up    New pain in the chest, arm, shoulder, neck, or upper back  Date Last Reviewed: 9/21/2015 2000-2017 [employer]. [address] [place], [place] [id number]. All rights reserved. This information is not intended as a substitute for professional medical care. Always follow your healthcare professional's instructions.            Fernando Becker PA-C  [facility]

## 2017-11-15 NOTE — PATIENT INSTRUCTIONS
Tylenol, warm and/or cool compresses, rest, elevation  Follow up next week if still bothersome  REturn sooner with new or worsening symptoms      Superficial Thrombophlebitis   The superficial veins are the veins near the surface of the skin. Superficial thrombophlebitis is a problem that occurs when one or more of these veins become inflamed (red, irritated, and swollen). This is most often because of a blood clot.  Causes  The problem may occur after injury to a vein. It may also occur after having an intravenous (IV) line placed. Other factors that can make the problem more likely include:    Varicose veins    Venous insufficiency    Bleeding disorders    Prolonged periods of rest and not moving around    IV drug abuse  Symptoms  Symptoms may appear in the affected area. They can include:    Pain    Tenderness    Redness    Warmth    Swelling    Hardening of the vein  In most cases, superficial thrombophlebitis resolves on its own with no problems. Treatment is focused on relieving symptoms.  Sometimes, there is a risk that the deep veins in the body may also be involved. This can lead to more serious problems. In such cases, further testing and treatments may be needed. Your healthcare provider can tell you more about this.  Home Care  To help relieve pain and swelling, you may be told to:    Apply heat or cold to the affected area. Do this for up to 10 minutes as often as directed.    Heat: Use a warm compress, such as a heating pad.    Cold: Use a cold compress, such as a cold pack or bag of ice wrapped in a thin towel.    Take nonsteroidal anti-inflammatory drugs (NSAIDS), such as ibuprofen. In some cases, other pain medicines may be prescribed.    Keep the affected limb (arm or leg) raised above heart level as directed.    Wear elastic compression stockings or bandages as directed.    Avoid prolonged sitting or standing. Get up and walk often.  To help treat a blood clot, a blood thinner (anticoagulant)  may be prescribed. If this is needed, be sure to take the medicine exactly as directed.  Follow-up care  Follow up with your healthcare provider as advised. If imaging tests are done, they will be reviewed by a doctor. You ll be told the results and any new findings that may affect your treatment.  When to seek medical advice  Call your healthcare provider right away if any of these occur:    Fever of 100.4 F (38 C) or higher, or as directed by your provider    Increasing pain, swelling, or tenderness in the affected area    Spreading warmth or redness in the affected area  Call 911  Call 911 right away if any of these occur:    Trouble breathing    Chest pain or discomfort that worsens with deep breathing or coughing    Coughing (may cough up blood)    Fast or irregular heartbeat    Sweating    Anxiety    Lightheadedness, dizziness, or fainting    Extreme confusion    Extreme drowsiness or trouble waking up    New pain in the chest, arm, shoulder, neck, or upper back  Date Last Reviewed: 9/21/2015 2000-2017 [employer]. [address] [place] [address]. All rights reserved. This information is not intended as a substitute for professional medical care. Always follow your healthcare professional's instructions.

## 2017-11-15 NOTE — NURSING NOTE
"Chief Complaint   Patient presents with     Infection       Initial /82 (BP Location: Right arm, Patient Position: Chair, Cuff Size: Adult Large)  Pulse 75  Temp 98  F (36.7  C) (Oral)  Ht 5' 8\" (1.727 m)  SpO2 97% Estimated body mass index is 33.91 kg/(m^2) as calculated from the following:    Height as of 10/4/17: 5' 8\" (1.727 m).    Weight as of 10/4/17: 223 lb (101.2 kg).  Medication Reconciliation: complete   Leonora Wall MA      "

## 2017-11-15 NOTE — MR AVS SNAPSHOT
After Visit Summary   11/15/2017    Yrn Will    MRN: [id number]           Patient Information     Date Of Birth          1960        Visit Information        Provider Department      11/15/2017 8:20 AM Fernando Becker PA-C [facility]        Today's Diagnoses     Superficial thrombophlebitis of left upper extremity    -  1      Care Instructions      Tylenol, warm and/or cool compresses, rest, elevation  Follow up next week if still bothersome  REturn sooner with new or worsening symptoms      Superficial Thrombophlebitis   The superficial veins are the veins near the surface of the skin. Superficial thrombophlebitis is a problem that occurs when one or more of these veins become inflamed (red, irritated, and swollen). This is most often because of a blood clot.  Causes  The problem may occur after injury to a vein. It may also occur after having an intravenous (IV) line placed. Other factors that can make the problem more likely include:    Varicose veins    Venous insufficiency    Bleeding disorders    Prolonged periods of rest and not moving around    IV drug abuse  Symptoms  Symptoms may appear in the affected area. They can include:    Pain    Tenderness    Redness    Warmth    Swelling    Hardening of the vein  In most cases, superficial thrombophlebitis resolves on its own with no problems. Treatment is focused on relieving symptoms.  Sometimes, there is a risk that the deep veins in the body may also be involved. This can lead to more serious problems. In such cases, further testing and treatments may be needed. Your healthcare provider can tell you more about this.  Home Care  To help relieve pain and swelling, you may be told to:    Apply heat or cold to the affected area. Do this for up to 10 minutes as often as directed.    Heat: Use a warm compress, such as a heating pad.    Cold: Use a cold compress, such as a cold pack or bag of ice wrapped in a thin  towel.    Take nonsteroidal anti-inflammatory drugs (NSAIDS), such as ibuprofen. In some cases, other pain medicines may be prescribed.    Keep the affected limb (arm or leg) raised above heart level as directed.    Wear elastic compression stockings or bandages as directed.    Avoid prolonged sitting or standing. Get up and walk often.  To help treat a blood clot, a blood thinner (anticoagulant) may be prescribed. If this is needed, be sure to take the medicine exactly as directed.  Follow-up care  Follow up with your healthcare provider as advised. If imaging tests are done, they will be reviewed by a doctor. You ll be told the results and any new findings that may affect your treatment.  When to seek medical advice  Call your healthcare provider right away if any of these occur:    Fever of 100.4 F (38 C) or higher, or as directed by your provider    Increasing pain, swelling, or tenderness in the affected area    Spreading warmth or redness in the affected area  Call 911  Call 911 right away if any of these occur:    Trouble breathing    Chest pain or discomfort that worsens with deep breathing or coughing    Coughing (may cough up blood)    Fast or irregular heartbeat    Sweating    Anxiety    Lightheadedness, dizziness, or fainting    Extreme confusion    Extreme drowsiness or trouble waking up    New pain in the chest, arm, shoulder, neck, or upper back  Date Last Reviewed: 9/21/2015 2000-2017 [employer]. [address] [place] [id number]. All rights reserved. This information is not intended as a substitute for professional medical care. Always follow your healthcare professional's instructions.                Follow-ups after your visit        Your next 10 appointments already scheduled     Nov 15, 2017 11:10 AM CST   US VENOUS with [email or web address]   [facility] ([facility])    [address]  [facility] [address]    [phone]           Please bring a list of your medicines (including vitamins, minerals and over-the-counter drugs). Also, tell your doctor about any allergies you may have. Wear comfortable clothes and leave your valuables at home.  You do not need to do anything special to prepare for your exam.  Please call the Imaging Department at your exam site with any questions.              Future tests that were ordered for you today     Open Future Orders        Priority Expected Expires Ordered    US Upper Extremity Venous Duplex Left STAT  11/15/2018 11/15/2017            Who to contact     If you have questions or need follow up information about today's clinic visit or your schedule please contact [facility] directly at [phone].  Normal or non-critical lab and imaging results will be communicated to you by MyChart, letter or phone within 4 business days after the clinic has received the results. If you do not hear from us within 7 days, please contact the clinic through [employer] or phone. If you have a critical or abnormal lab result, we will notify you by phone as soon as possible.  Submit refill requests through [employer] or call your pharmacy and they will forward the refill request to us. Please allow 3 business days for your refill to be completed.          Additional Information About Your Visit        [employer] Information     [employer] gives you secure access to your electronic health record. If you see a primary care provider, you can also send messages to your care team and make appointments. If you have questions, please call your primary care clinic.  If you do not have a primary care provider, please call [phone] and they will assist you.        Care EveryWhere ID     This is your Care EveryWhere ID. This could be used by other organizations to access your [place] medical records  [id number]        Your Vitals Were     Pulse Temperature Height Pulse Oximetry          75 98  F (36.7  " C) (Oral) 5' 8\" (1.727 m) 97%         Blood Pressure from Last 3 Encounters:   11/15/17 116/82   10/04/17 114/70   10/03/17 124/84    Weight from Last 3 Encounters:   10/04/17 223 lb (101.2 kg)   08/02/17 220 lb (99.8 kg)   05/31/17 225 lb (102.1 kg)              We Performed the Following     WBC count        Primary Care Provider Office Phone # Fax #    Enrico Reagan [phone] [address] [place] [address]        Equal Access to Services     [facility]: Hadii aad ku hadasho Soomaali, waaxda luqadaha, qaybta kaalmada adeadeelyada, oxana dill . So [facility] [phone].    ATENCIÓN: Si habla español, tiene a bello disposición servicios gratuitos de asistencia lingüística. Llame al [phone].    We comply with applicable federal civil rights laws and [place] laws. We do not discriminate on the basis of race, color, national origin, age, disability, sex, sexual orientation, or gender identity.            Thank you!     [facility] you [facility] choosing [facility]  for your care. Our goal is always to provide you with excellent care. Hearing back from our patients is one way we can continue to improve our services. Please take a few minutes to complete the written survey that you may receive in the mail after your visit with us. Thank you!             Your Updated Medication List - Protect others around you: Learn how to safely use, store and throw away your medicines at www.disposemymeds.org.          This list is accurate as of: 11/15/17  9:01 AM.  Always use your most recent med list.                   Brand Name Dispense Instructions for use Diagnosis    aspirin 325 MG tablet      Take  by mouth daily.        lisinopril-hydrochlorothiazide 20-12.5 MG per tablet    PRINZIDE/ZESTORETIC    180 tablet    Take 1 tablet by mouth daily    Essential hypertension with goal blood pressure less than 140/90       omeprazole 20 MG CR capsule    priLOSEC "    90 capsule    TAKE 1 CAPSULE DAILY AS NEEDED    Gastroesophageal reflux disease without esophagitis       simvastatin 40 MG tablet    ZOCOR    90 tablet    Take 1 tablet (40 mg) by mouth At Bedtime    Hyperlipidemia LDL goal <130

## 2017-12-11 DIAGNOSIS — R97.20 ELEVATED PROSTATE SPECIFIC ANTIGEN (PSA): Primary | ICD-10-CM

## 2017-12-11 LAB
ALBUMIN UR-MCNC: NEGATIVE MG/DL
APPEARANCE UR: CLEAR
BILIRUB UR QL STRIP: NEGATIVE
COLOR UR AUTO: YELLOW
GLUCOSE UR STRIP-MCNC: NEGATIVE MG/DL
HGB UR QL STRIP: NEGATIVE
KETONES UR STRIP-MCNC: NEGATIVE MG/DL
LEUKOCYTE ESTERASE UR QL STRIP: NEGATIVE
NITRATE UR QL: NEGATIVE
PH UR STRIP: 5.5 PH (ref 5–7)
SOURCE: NORMAL
SP GR UR STRIP: 1.01 (ref 1–1.03)
UROBILINOGEN UR STRIP-ACNC: 0.2 EU/DL (ref 0.2–1)

## 2017-12-11 PROCEDURE — 81003 URINALYSIS AUTO W/O SCOPE: CPT | Performed by: INTERNAL MEDICINE

## 2017-12-19 ENCOUNTER — TRANSFERRED RECORDS (OUTPATIENT)
Dept: HEALTH INFORMATION MANAGEMENT | Facility: CLINIC | Age: 57
End: 2017-12-19

## 2017-12-30 ENCOUNTER — OFFICE VISIT (OUTPATIENT)
Dept: URGENT CARE | Facility: URGENT CARE | Age: 57
End: 2017-12-30
Payer: COMMERCIAL

## 2017-12-30 VITALS
SYSTOLIC BLOOD PRESSURE: 126 MMHG | HEART RATE: 66 BPM | RESPIRATION RATE: 12 BRPM | BODY MASS INDEX: 35.12 KG/M2 | TEMPERATURE: 98.1 F | WEIGHT: 231 LBS | DIASTOLIC BLOOD PRESSURE: 80 MMHG | OXYGEN SATURATION: 97 %

## 2017-12-30 DIAGNOSIS — M10.9 ACUTE GOUTY ARTHRITIS: Primary | ICD-10-CM

## 2017-12-30 PROCEDURE — 99213 OFFICE O/P EST LOW 20 MIN: CPT | Performed by: FAMILY MEDICINE

## 2017-12-30 RX ORDER — PREDNISONE 20 MG/1
30 TABLET ORAL DAILY
Qty: 8 TABLET | Refills: 0 | Status: SHIPPED | OUTPATIENT
Start: 2017-12-30 | End: 2018-02-18

## 2017-12-30 NOTE — NURSING NOTE
"Chief Complaint   Patient presents with     Urgent Care     Arthritis     right foot pain x 3 days and it is just like when he has had gout in the past.  Pain is in right big toe area.       Initial /80 (BP Location: Right arm, Patient Position: Sitting, Cuff Size: Adult Regular)  Pulse 66  Temp 98.1  F (36.7  C) (Oral)  Resp 12  Wt 231 lb (104.8 kg)  SpO2 97%  BMI 35.12 kg/m2 Estimated body mass index is 35.12 kg/(m^2) as calculated from the following:    Height as of 11/15/17: 5' 8\" (1.727 m).    Weight as of this encounter: 231 lb (104.8 kg)..  BP completed using cuff size: regular  Flora Miller R.N.    "

## 2017-12-30 NOTE — MR AVS SNAPSHOT
After Visit Summary   12/30/2017    Yrn Will    MRN: [id number]           Patient Information     Date Of Birth          1960        Visit Information        Provider Department      12/30/2017 11:35 AM Ronald Conti MD [facility]        Today's Diagnoses     Acute gouty arthritis    -  1      Care Instructions      Gout    Gout is an inflammation of a joint due to a build-up of gout crystals in the joint fluid. This occurs when there is an excess of uric acid (a normal waste product) in the body. Uric acid builds up in the body when the kidneys are unable to filter enough of it from the blood. This may occur with age. It is also associated with kidney disease. Gout occurs more often in people with obesity, diabetes, high blood pressure, or high levels of fats in the blood. It may run in families. Gout tends to come and go. A flare up of gout is called an attack. Drinking alcohol or eating certain foods (such as shellfish or foods with additives such as high-fructose corn syrup) may increase uric acid levels in the blood and cause a gout attack.  During a gout attack, the affected joint may become a hot, red, swollen and painful. If you have had one attack of gout, you are likely to have another. An attack of gout can be treated with medicine. If these attacks become frequent, a daily medicine may be prescribed to help the kidneys remove uric acid from the body.  Home care  During a gout attack:    Rest painful joints. If gout affects the joints of your foot or leg, you may want to use crutches for the first few days to keep from bearing weight on the affected joint.    When sitting or lying down, raise the painful joint to a level higher than your heart.    Apply an ice pack (ice cubes in a plastic bag wrapped in a thin towel) over the injured area for 20 minutes every 1 to 2 hours the first day for pain relief. Continue this 3 to 4 times a day for swelling and  pain.    Avoid alcohol and foods listed below (see Preventing attacks) during a gout attack. Drink extra fluid to help flush the uric acid through your kidneys.    If you were prescribed a medicine to treat gout, take it as your healthcare provider has instructed. Don't skip doses.    Take anti-inflammatory medicine as directed.     If pain medicines have been prescribed, take them exactly as directed.    Preventing attacks    Minimize or avoid alcohol use. Excess alcohol intake can cause a gout attack.    Limit these foods and beverages:    Organ meats, such as kidneys and liver    Certain seafoods (anchovies, sardines, shrimp, scallops, herring, mackerel)    Wild game, meat extracts and meat gravies    Foods and beverages sweetened with high-fructose corn syrup, such as sodas    Eat a healthy diet including low-fat and nonfat dairy, whole grains, and vegetables.    If you are overweight, talk to your healthcare provider about a weight reduction plan. Avoid fasting or extreme low calorie diets (less than 900 calories per day). This will increase uric acid levels in the body.    If you have diabetes or high blood pressure, work with your doctor to manage these conditions.    Protect the joint from injury. Trauma can trigger a gout attack.  Follow-up care  Follow up with your healthcare provider, or as advised.   When to seek medical advice  Call your healthcare provider if you have any of the following:    Fever over 100.4 F (38. C) with worsening joint pain    Increasing redness around the joint    Pain developing in another joint    Repeated vomiting, abdominal pain, or blood in the vomit or stool (black or red color)  Date Last Reviewed: 3/1/2017    0087-5394 [employer]. [address] [place] [address]. All rights reserved. This information is not intended as a substitute for professional medical care. Always follow your healthcare professional's instructions.        Gout Diet  Gout is a  painful condition caused by an excess of uric acid, a waste product made by the body. Uric acid forms crystals that collect in the joints. The immune response to these crystals brings on symptoms of joint pain and swelling. This is called a gout attack. Often, medications and diet changes are combined to manage gout. Below are some guidelines for changing your diet to help you manage gout and prevent attacks. Your health care provider will help you determine the best eating plan for you.     Eating to manage gout  Weight loss for those who are overweight may help reduce gout attacks.  Eat less of these foods  Eating too many foods containing purines may raise the levels of uric acid in your body. This raises your risk for a gout attack. Try to limit these foods and drinks:    Alcohol, such as beer and red wine. You may be told to avoid alcohol completely.    Soft drinks that contain sugar or high fructose corn syrup    Certain fish, including anchovies, sardines, fish eggs, and herring    Shellfish    Certain meats, such as red meat, hot dogs, luncheon meats, and turkey    Organ meats, such as liver, kidneys, and sweetbreads    Legumes, such as dried beans and peas    Other high fat foods such as gravy, whole milk, and high fat cheeses    Vegetables such as asparagus, cauliflower, spinach, and mushrooms used to be thought to contribute to an increased risk for a gout attack, but recent studies show that high purine vegetables don't increase the risk for a gout attack.  Eat more of these foods  Other foods may be helpful for people with gout. Add some of these foods to your diet:    Cherries contain chemicals that may lower uric acid.    Omega fatty acids. These are found in some fatty fish such as salmon, certain oils (flax, olive, or nut), and nuts themselves. Omega fatty acids may help prevent inflammation due to gout.    Dairy products that are low-fat or fat-free, such as cheese and yogurt    Complex  carbohydrate foods, including whole grains, brown rice, oats, and beans    Coffee, in moderation    Water, approximately 64 ounces per day  Follow-up care  Follow up with your healthcare provider as advised.  When to seek medical advice  Call your healthcare provider right away if any of these occur:    Return of gout symptoms, usually at night:    Severe pain, swelling, and heat in a joint, especially the base of the big toe    Affected joint is hard to move    Skin of the affected joint is purple or red    Fever of 100.4 F (38 C) or higher    Pain that doesn't get better even with prescribed medicine   Date Last Reviewed: 1/12/2016 2000-2017 [employer]. [address] [place] [address]. All rights reserved. This information is not intended as a substitute for professional medical care. Always follow your healthcare professional's instructions.                Follow-ups after your visit        Who to contact     If you have questions or need follow up information about today's clinic visit or your schedule please contact [facility] directly at [phone].  Normal or non-critical lab and imaging results will be communicated to you by [employer], letter or phone within 4 business days after the clinic has received the results. If you do not hear from us within 7 days, please contact the clinic through [employer] or phone. If you have a critical or abnormal lab result, we will notify you by phone as soon as possible.  Submit refill requests through [employer] or call your pharmacy and they will forward the refill request to us. Please allow 3 business days for your refill to be completed.          Additional Information About Your Visit        [employer] Information     [employer] gives you secure access to your electronic health record. If you see a primary care provider, you can also send messages to your care team and make appointments. If you have questions, please call your primary  care clinic.  If you do not have a primary care provider, please call [phone] and they will assist you.        Care EveryWhere ID     This is your Care EveryWhere ID. This could be used by other organizations to access your [facility] medical records  [id number]        Your Vitals Were     Pulse Temperature Respirations Pulse Oximetry BMI (Body Mass Index)       66 98.1  F (36.7  C) (Oral) 12 97% 35.12 kg/m2        Blood Pressure from Last 3 Encounters:   12/30/17 126/80   11/15/17 116/82   10/04/17 114/70    Weight from Last 3 Encounters:   12/30/17 231 lb (104.8 kg)   10/04/17 223 lb (101.2 kg)   08/02/17 220 lb (99.8 kg)              Today, you had the following     No orders found for display         Today's Medication Changes          These changes are accurate as of: 12/30/17  1:46 PM.  If you have any questions, ask your nurse or doctor.               Start taking these medicines.        Dose/Directions    predniSONE 20 MG tablet   Commonly known as:  DELTASONE   Used for:  Acute gouty arthritis   Started by:  Ronald Conti MD        Dose:  30 mg   Take 1.5 tablets (30 mg) by mouth daily for 5 days   Quantity:  8 tablet   Refills:  0         Stop taking these medicines if you haven't already. Please contact your care team if you have questions.     aspirin 325 MG tablet   Stopped by:  Ronald Conti MD                Where to get your medicines      These medications were sent to [place] [id number] [place] [facility] [address]  [address] [facility] [address] [facility] [address]     Phone:  [phone]     predniSONE 20 MG tablet                Primary Care Provider Office Phone # Fax #    Enrico Reagan [phone] [address] [place] [address]        Equal Access to Services     [facility] ANTWON AH: Milka Batres, wacarolin salinas, qaybta kaalgina foster, oxana agiular. So [facility] [phone].    ATENCIÓN: Hari israel  español, tiene a bello disposición servicios gratuitos de asistencia lingüística. Miguel corona [phone].    We comply with applicable federal civil rights laws and [place] laws. We do not discriminate on the basis of race, color, national origin, age, disability, sex, sexual orientation, or gender identity.            Thank you!     Thank you for choosing [facility]  for your care. Our goal is always to provide you with excellent care. Hearing back from our patients is one way we can continue to improve our services. Please take a few minutes to complete the written survey that you may receive in the mail after your visit with us. Thank you!             Your Updated Medication List - Protect others around you: Learn how to safely use, store and throw away your medicines at www.disposemymeds.org.          This list is accurate as of: 12/30/17  1:46 PM.  Always use your most recent med list.                   Brand Name Dispense Instructions for use Diagnosis    lisinopril-hydrochlorothiazide 20-12.5 MG per tablet    PRINZIDE/ZESTORETIC    180 tablet    Take 1 tablet by mouth daily    Essential hypertension with goal blood pressure less than 140/90       omeprazole 20 MG CR capsule    priLOSEC    90 capsule    TAKE 1 CAPSULE DAILY AS NEEDED    Gastroesophageal reflux disease without esophagitis       predniSONE 20 MG tablet    DELTASONE    8 tablet    Take 1.5 tablets (30 mg) by mouth daily for 5 days    Acute gouty arthritis       simvastatin 40 MG tablet    ZOCOR    90 tablet    Take 1 tablet (40 mg) by mouth At Bedtime    Hyperlipidemia LDL goal <130

## 2017-12-31 NOTE — PROGRESS NOTES
SUBJECTIVE:  Yrn Will, a 57 year old male scheduled an appointment to discuss the following issues:  Acute gouty arthritis    Medical, social, surgical, and family histories reviewed.    Urgent Care      Arthritis  right foot pain x 3 days and it is just like when he has had gout in the past. Pain is in right big toe area.       Right great toe pain, swelling and redness for 3 days.  No injuries or fall.  Has had gout since June 2017.  Pt does have chronic kidney disease, last Creatinine 1.75 in Oct 2017.    ROS:  See HPI.  No nausea/vomiting.  No fever/chills.  No chest pain/SOB.  No BM/urine problems.  No dizziness or syncope.      OBJECTIVE:  /80 (BP Location: Right arm, Patient Position: Sitting, Cuff Size: Adult Regular)  Pulse 66  Temp 98.1  F (36.7  C) (Oral)  Resp 12  Wt 231 lb (104.8 kg)  SpO2 97%  BMI 35.12 kg/m2  EXAM:  GENERAL APPEARANCE:  alert and no distress; afebrile; not septic  EYES: Eyes grossly normal to inspection, PERRL and conjunctivae and sclerae normal  HENT: ear canals and TM's normal and nose and mouth without ulcers or lesions  NECK: no adenopathy, no asymmetry, masses, or scars and thyroid normal to palpation  RESP: lungs clear to auscultation - no rales, rhonchi or wheezes  CV: regular rates and rhythm, normal S1 S2, no S3 or S4 and no murmur, click or rub  MS & SKIN: red, somewhat hot, swollen right great toe MCP joint; no open wound; able to walk  NEURO: Normal strength and tone, mentation intact and speech normal; neurovascularly intact bilateral upper and lower extremities    ASSESSMENT/PLAN:  (M10.9) Acute gouty arthritis  (primary encounter diagnosis)  Plan: predniSONE (DELTASONE) 20 MG tablet  Care instructions given  Pt to f/up PCP if no improvement or worsening.  Warning signs and symptoms explained.

## 2018-01-17 ENCOUNTER — TELEPHONE (OUTPATIENT)
Dept: PEDIATRICS | Facility: CLINIC | Age: 58
End: 2018-01-17

## 2018-01-17 DIAGNOSIS — I10 ESSENTIAL HYPERTENSION WITH GOAL BLOOD PRESSURE LESS THAN 140/90: Primary | ICD-10-CM

## 2018-01-17 NOTE — TELEPHONE ENCOUNTER
Notified patient. He said he had his biopsy 12/19/17 and consult 1/11/18. He doesn't need a preop. Will call for the records.  Johnathan, [facility]

## 2018-01-17 NOTE — TELEPHONE ENCOUNTER
Patient is scheduled for Prostatectomy on 02/05/18 at [facility].  His current blood pressure medication is Prinzide.  States that after surgery he will need a blood pressure medication without a diuretic in it.  Blood pressure at home running 130s/85  We don't have any recent records from [facility].    Physical exam done 10/04/17.  Please call for more recent records from [facility].  Thanks.    Dr. Reagan have you received any updated records regarding medication change from [facility]?  JEANIE Coreas RN  .

## 2018-01-18 RX ORDER — LISINOPRIL 20 MG/1
20 TABLET ORAL DAILY
Qty: 90 TABLET | Refills: 3 | Status: SHIPPED | OUTPATIENT
Start: 2018-01-18 | End: 2018-02-12

## 2018-01-18 NOTE — TELEPHONE ENCOUNTER
Discussed with pt  Pt anticipating postop urinary incontinence, would like to stop HCTZ  rec stopping HCTZ and increasing lisinopril from 20 to 40  Pt prefers to continue lisinopril 20 and call in home readings in 1-2 weeks

## 2018-02-06 ENCOUNTER — TRANSFERRED RECORDS (OUTPATIENT)
Dept: HEALTH INFORMATION MANAGEMENT | Facility: CLINIC | Age: 58
End: 2018-02-06

## 2018-02-07 ENCOUNTER — TRANSFERRED RECORDS (OUTPATIENT)
Dept: HEALTH INFORMATION MANAGEMENT | Facility: CLINIC | Age: 58
End: 2018-02-07

## 2018-02-07 ENCOUNTER — APPOINTMENT (OUTPATIENT)
Dept: CT IMAGING | Facility: CLINIC | Age: 58
End: 2018-02-07
Attending: EMERGENCY MEDICINE
Payer: COMMERCIAL

## 2018-02-07 ENCOUNTER — HOSPITAL ENCOUNTER (EMERGENCY)
Facility: CLINIC | Age: 58
Discharge: HOME | End: 2018-02-08
Attending: EMERGENCY MEDICINE | Admitting: EMERGENCY MEDICINE
Payer: COMMERCIAL

## 2018-02-07 DIAGNOSIS — R50.82 POSTOPERATIVE FEVER: ICD-10-CM

## 2018-02-07 LAB
ANION GAP SERPL CALCULATED.3IONS-SCNC: 9 MMOL/L (ref 3–14)
BASOPHILS # BLD AUTO: 0.1 10E9/L (ref 0–0.2)
BASOPHILS NFR BLD AUTO: 0.6 %
BUN SERPL-MCNC: 16 MG/DL (ref 7–30)
CALCIUM SERPL-MCNC: 8.4 MG/DL (ref 8.5–10.1)
CHLORIDE SERPL-SCNC: 102 MMOL/L (ref 94–109)
CO2 SERPL-SCNC: 25 MMOL/L (ref 20–32)
CREAT SERPL-MCNC: 1.5 MG/DL (ref 0.66–1.25)
DIFFERENTIAL METHOD BLD: ABNORMAL
EOSINOPHIL # BLD AUTO: 0.4 10E9/L (ref 0–0.7)
EOSINOPHIL NFR BLD AUTO: 3.4 %
ERYTHROCYTE [DISTWIDTH] IN BLOOD BY AUTOMATED COUNT: 12.9 % (ref 10–15)
GFR SERPL CREATININE-BSD FRML MDRD: 48 ML/MIN/1.7M2
GLUCOSE SERPL-MCNC: 106 MG/DL (ref 70–99)
HCT VFR BLD AUTO: 39.2 % (ref 40–53)
HGB BLD-MCNC: 13.3 G/DL (ref 13.3–17.7)
IMM GRANULOCYTES # BLD: 0.1 10E9/L (ref 0–0.4)
IMM GRANULOCYTES NFR BLD: 0.5 %
LACTATE SERPL-SCNC: 0.8 MMOL/L (ref 0.4–2)
LYMPHOCYTES # BLD AUTO: 1.6 10E9/L (ref 0.8–5.3)
LYMPHOCYTES NFR BLD AUTO: 14.3 %
MCH RBC QN AUTO: 33.6 PG (ref 26.5–33)
MCHC RBC AUTO-ENTMCNC: 33.9 G/DL (ref 31.5–36.5)
MCV RBC AUTO: 99 FL (ref 78–100)
MONOCYTES # BLD AUTO: 1 10E9/L (ref 0–1.3)
MONOCYTES NFR BLD AUTO: 8.9 %
NEUTROPHILS # BLD AUTO: 8.2 10E9/L (ref 1.6–8.3)
NEUTROPHILS NFR BLD AUTO: 72.3 %
NRBC # BLD AUTO: 0 10*3/UL
NRBC BLD AUTO-RTO: 0 /100
PLATELET # BLD AUTO: 164 10E9/L (ref 150–450)
POTASSIUM SERPL-SCNC: 3.9 MMOL/L (ref 3.4–5.3)
RBC # BLD AUTO: 3.96 10E12/L (ref 4.4–5.9)
SODIUM SERPL-SCNC: 136 MMOL/L (ref 133–144)
WBC # BLD AUTO: 11.4 10E9/L (ref 4–11)

## 2018-02-07 PROCEDURE — 25000128 H RX IP 250 OP 636: Performed by: EMERGENCY MEDICINE

## 2018-02-07 PROCEDURE — 96374 THER/PROPH/DIAG INJ IV PUSH: CPT

## 2018-02-07 PROCEDURE — 80048 BASIC METABOLIC PNL TOTAL CA: CPT | Performed by: EMERGENCY MEDICINE

## 2018-02-07 PROCEDURE — 96361 HYDRATE IV INFUSION ADD-ON: CPT

## 2018-02-07 PROCEDURE — 71260 CT THORAX DX C+: CPT

## 2018-02-07 PROCEDURE — 85025 COMPLETE CBC W/AUTO DIFF WBC: CPT | Performed by: EMERGENCY MEDICINE

## 2018-02-07 PROCEDURE — 83605 ASSAY OF LACTIC ACID: CPT | Performed by: EMERGENCY MEDICINE

## 2018-02-07 PROCEDURE — 99285 EMERGENCY DEPT VISIT HI MDM: CPT | Mod: 25

## 2018-02-07 RX ORDER — SODIUM CHLORIDE 9 MG/ML
1000 INJECTION, SOLUTION INTRAVENOUS CONTINUOUS
Status: DISCONTINUED | OUTPATIENT
Start: 2018-02-07 | End: 2018-02-08 | Stop reason: HOSPADM

## 2018-02-07 RX ORDER — ONDANSETRON 2 MG/ML
4 INJECTION INTRAMUSCULAR; INTRAVENOUS ONCE
Status: COMPLETED | OUTPATIENT
Start: 2018-02-07 | End: 2018-02-07

## 2018-02-07 RX ADMIN — SODIUM CHLORIDE 1000 ML: 9 INJECTION, SOLUTION INTRAVENOUS at 23:13

## 2018-02-07 RX ADMIN — ONDANSETRON 4 MG: 2 INJECTION INTRAMUSCULAR; INTRAVENOUS at 23:13

## 2018-02-07 ASSESSMENT — ENCOUNTER SYMPTOMS
SHORTNESS OF BREATH: 0
FEVER: 1
COUGH: 0
RHINORRHEA: 0

## 2018-02-07 NOTE — ED AVS SNAPSHOT
[facility]    [address]    [facility] [address]    Phone:  [phone]    Fax:  [phone]                                       Yrn Will   MRN: [id number]    Department:  [facility] Emergency Department   Date of Visit:  2/7/2018           After Visit Summary Signature Page     I have received my discharge instructions, and my questions have been answered. I have discussed any challenges I see with this plan with the nurse or doctor.    ..........................................................................................................................................  Patient/Patient Representative Signature      ..........................................................................................................................................  Patient Representative Print Name and Relationship to Patient    ..................................................               ................................................  Date                                            Time    ..........................................................................................................................................  Reviewed by Signature/Title    ...................................................              ..............................................  Date                                                            Time

## 2018-02-07 NOTE — ED AVS SNAPSHOT
[facility] Emergency Department    [address]    [facility] [address]    Phone:  [phone]    Fax:  [phone]                                       Yrn Will   MRN: [id number]    Department:  [facility] Department   Date of Visit:  2/7/2018           Patient Information     Date Of Birth          1960        Your diagnoses for this visit were:     Postoperative fever        You were seen by Josef Brown MD.      Follow-up Information     Follow up with Urology @ [facility] . Call in 1 day.    Why:  call tomorrow to discuss symptoms and follow-up         Follow up with [facility] Emergency [facility].    Specialty:  EMERGENCY MEDICINE    Why:  As needed, If symptoms worsen    Contact information:    [address]  [facility] [address] [phone]      Discharge References/Attachments     FEBRILE ILLNESS, UNCERTAIN CAUSE (ADULT) (ENGLISH)      24 Hour Appointment Hotline       To make an appointment at any [facility] clinic, call [phone]). If you don't have a family doctor or clinic, we will help you find one. [facility] clinics are conveniently located to serve the needs of you and your family.             Review of your medicines      START taking        Dose / Directions Last dose taken    ciprofloxacin 500 MG tablet   Commonly known as:  CIPRO   Dose:  500 mg   Quantity:  14 tablet        Take 1 tablet (500 mg) by mouth 2 times daily for 7 days   Refills:  0          Our records show that you are taking the medicines listed below. If these are incorrect, please call your family doctor or clinic.        Dose / Directions Last dose taken    lisinopril 20 MG tablet   Commonly known as:  PRINIVIL/ZESTRIL   Dose:  20 mg   Quantity:  90 tablet        Take 1 tablet (20 mg) by mouth daily   Refills:  3        omeprazole 20 MG CR capsule   Commonly known as:  priLOSEC   Quantity:  90 capsule         TAKE 1 CAPSULE DAILY AS NEEDED   Refills:  2        OXYBUTYNIN CHLORIDE PO   Dose:  5 mg        Take 5 mg by mouth   Refills:  0        OXYCODONE HCL PO   Dose:  10 mg        Take 10 mg by mouth   Refills:  0        REVATIO PO        Refills:  0        simvastatin 40 MG tablet   Commonly known as:  ZOCOR   Dose:  40 mg   Quantity:  90 tablet        Take 1 tablet (40 mg) by mouth At Bedtime   Refills:  3                Prescriptions were sent or printed at these locations (1 Prescription)                   Other Prescriptions                Printed at Department/Unit printer (1 of 1)         ciprofloxacin (CIPRO) 500 MG tablet                Procedures and tests performed during your visit     Basic metabolic panel    CBC with platelets differential    Chest CT, IV contrast only - PE protocol    Lactic acid    UA with Microscopic reflex to Culture    Urine Culture      Orders Needing Specimen Collection     None      Pending Results     Date and Time Order Name Status Description    2/8/2018 0150 Urine Culture In process     2/7/2018 2331 Chest CT, IV contrast only - PE protocol Preliminary             Pending Culture Results     Date and Time Order Name Status Description    2/8/2018 0150 Urine Culture In process             Pending Results Instructions     If you had any lab results that were not finalized at the time of your Discharge, you can call the ED Lab Result RN at [phone]. You will be contacted by this team for any positive Lab results or changes in treatment. The nurses are available 7 days a week from 10A to 6:30P.  You can leave a message 24 hours per day and they will return your call.        Test Results From Your Hospital Stay        2/7/2018 11:39 PM      Component Results     Component Value Ref Range & Units Status    WBC 11.4 (H) 4.0 - 11.0 10e9/L Final    RBC Count 3.96 (L) 4.4 - 5.9 10e12/L Final    Hemoglobin 13.3 13.3 - 17.7 g/dL Final    Hematocrit 39.2 (L) 40.0 - 53.0 % Final    MCV 99  78 - 100 fl Final    MCH 33.6 (H) 26.5 - 33.0 pg Final    MCHC 33.9 31.5 - 36.5 g/dL Final    RDW 12.9 10.0 - 15.0 % Final    Platelet Count 164 150 - 450 10e9/L Final    Diff Method Automated Method  Final    % Neutrophils 72.3 % Final    % Lymphocytes 14.3 % Final    % Monocytes 8.9 % Final    % Eosinophils 3.4 % Final    % Basophils 0.6 % Final    % Immature Granulocytes 0.5 % Final    Nucleated RBCs 0 0 /100 Final    Absolute Neutrophil 8.2 1.6 - 8.3 10e9/L Final    Absolute Lymphocytes 1.6 0.8 - 5.3 10e9/L Final    Absolute Monocytes 1.0 0.0 - 1.3 10e9/L Final    Absolute Eosinophils 0.4 0.0 - 0.7 10e9/L Final    Absolute Basophils 0.1 0.0 - 0.2 10e9/L Final    Abs Immature Granulocytes 0.1 0 - 0.4 10e9/L Final    Absolute Nucleated RBC 0.0  Final         2/7/2018 11:52 PM      Component Results     Component Value Ref Range & Units Status    Sodium 136 133 - 144 mmol/L Final    Potassium 3.9 3.4 - 5.3 mmol/L Final    Chloride 102 94 - 109 mmol/L Final    Carbon Dioxide 25 20 - 32 mmol/L Final    Anion Gap 9 3 - 14 mmol/L Final    Glucose 106 (H) 70 - 99 mg/dL Final    Urea Nitrogen 16 7 - 30 mg/dL Final    Creatinine 1.50 (H) 0.66 - 1.25 mg/dL Final    GFR Estimate 48 (L) >60 mL/min/1.7m2 Final    Non  GFR Calc    GFR Estimate If Black 58 (L) >60 mL/min/1.7m2 Final    African American GFR Calc    Calcium 8.4 (L) 8.5 - 10.1 mg/dL Final         2/7/2018 11:42 PM      Component Results     Component Value Ref Range & Units Status    Lactic Acid 0.8 0.4 - 2.0 mmol/L Final         2/8/2018 12:27 AM      Narrative     CT CHEST PULMONARY EMBOLISM W CONTRAST  2/8/2018 12:14 AM    HISTORY: Surgery yesterday, fever today, hypoxic (sats 90%-92% on RA).      TECHNIQUE: Scans obtained from the apices through the diaphragm with  IV contrast. 79 mL Isovue-370 injected. Radiation dose for this scan  was reduced using automated exposure control, adjustment of the mA  and/or kV according to patient size, or  iterative reconstruction  technique.    COMPARISON: None.    FINDINGS: Evaluation of the pulmonary arterial system shows no  evidence of embolus. The thoracic aorta is calcified and mildly  tortuous. No aortic aneurysm or dissection. The heart is at the upper  limits of normal in size. Calcified lymph node in the subcarinal  space. No mediastinal, hilar or axillary lymph node enlargement. There  is dependent atelectasis bilaterally. No pneumothorax or pleural  effusion. Images through the upper abdomen show no acute  abnormalities.        Impression     IMPRESSION: There is no pulmonary embolus, aortic aneurysm or  dissection.         2/8/2018 12:13 AM      Component Results     Component Value Ref Range & Units Status    Color Urine Straw  Final    Appearance Urine Clear  Final    Glucose Urine Negative NEG^Negative mg/dL Final    Bilirubin Urine Negative NEG^Negative Final    Ketones Urine 20 (A) NEG^Negative mg/dL Final    Specific Gravity Urine 1.012 1.003 - 1.035 Final    Blood Urine Large (A) NEG^Negative Final    pH Urine 5.0 5.0 - 7.0 pH Final    Protein Albumin Urine Negative NEG^Negative mg/dL Final    Urobilinogen mg/dL 0.0 0.0 - 2.0 mg/dL Final    Nitrite Urine Negative NEG^Negative Final    Leukocyte Esterase Urine Negative NEG^Negative Final    Source Catheterized Urine  Final    WBC Urine 8 (H) 0 - 2 /HPF Final    RBC Urine 171 (H) 0 - 2 /HPF Final    Mucous Urine Present (A) NEG^Negative /LPF Final         2/8/2018  1:52 AM                Clinical Quality Measure: Blood Pressure Screening     Your blood pressure was checked while you were in the emergency department today. The last reading we obtained was  BP: (!) 165/93 . Please read the guidelines below about what these numbers mean and what you should do about them.  If your systolic blood pressure (the top number) is less than 120 and your diastolic blood pressure (the bottom number) is less than 80, then your blood pressure is normal. There is  nothing more that you need to do about it.  If your systolic blood pressure (the top number) is 120-139 or your diastolic blood pressure (the bottom number) is 80-89, your blood pressure may be higher than it should be. You should have your blood pressure rechecked within a year by a primary care provider.  If your systolic blood pressure (the top number) is 140 or greater or your diastolic blood pressure (the bottom number) is 90 or greater, you may have high blood pressure. High blood pressure is treatable, but if left untreated over time it can put you at risk for heart attack, stroke, or kidney failure. You should have your blood pressure rechecked by a primary care provider within the next 4 weeks.  If your provider in the emergency department today gave you specific instructions to follow-up with your doctor or provider even sooner than that, you should follow that instruction and not wait for up to 4 weeks for your follow-up visit.        Thank you for choosing [facility]       Thank you for choosing [facility] for your care. Our goal is always to provide you with excellent care. Hearing back from our patients is one way we can continue to improve our services. Please take a few minutes to complete the written survey that you may receive in the mail after you visit with us. Thank you!        [employer] Information     [employer] gives you secure access to your electronic health record. If you see a primary care provider, you can also send messages to your care team and make appointments. If you have questions, please call your primary care clinic.  If you do not have a primary care provider, please call [phone] and they will assist you.        Care EveryWhere ID     This is your Care EveryWhere ID. This could be used by other organizations to access your [facility] medical records  [id number]        Equal Access to Services     ADDISON KAPOOR AH: steph Smalls qaybta kaalmada  oxana foster ah. So [facility] [phone].    ATENCIÓN: Si habla español, tiene a bello disposición servicios gratuitos de asistencia lingüística. Llame al [phone].    We comply with applicable federal civil rights laws and [place] laws. We do not discriminate on the basis of race, color, national origin, age, disability, sex, sexual orientation, or gender identity.            After Visit Summary       This is your record. Keep this with you and show to your community pharmacist(s) and doctor(s) at your next visit.

## 2018-02-08 VITALS
DIASTOLIC BLOOD PRESSURE: 93 MMHG | HEART RATE: 104 BPM | OXYGEN SATURATION: 93 % | SYSTOLIC BLOOD PRESSURE: 165 MMHG | RESPIRATION RATE: 18 BRPM | TEMPERATURE: 98 F

## 2018-02-08 LAB
ALBUMIN UR-MCNC: NEGATIVE MG/DL
APPEARANCE UR: CLEAR
BILIRUB UR QL STRIP: NEGATIVE
COLOR UR AUTO: ABNORMAL
GLUCOSE UR STRIP-MCNC: NEGATIVE MG/DL
HGB UR QL STRIP: ABNORMAL
KETONES UR STRIP-MCNC: 20 MG/DL
LEUKOCYTE ESTERASE UR QL STRIP: NEGATIVE
MUCOUS THREADS #/AREA URNS LPF: PRESENT /LPF
NITRATE UR QL: NEGATIVE
PH UR STRIP: 5 PH (ref 5–7)
RBC #/AREA URNS AUTO: 171 /HPF (ref 0–2)
SOURCE: ABNORMAL
SP GR UR STRIP: 1.01 (ref 1–1.03)
UROBILINOGEN UR STRIP-MCNC: 0 MG/DL (ref 0–2)
WBC #/AREA URNS AUTO: 8 /HPF (ref 0–2)

## 2018-02-08 PROCEDURE — 25000128 H RX IP 250 OP 636: Performed by: EMERGENCY MEDICINE

## 2018-02-08 PROCEDURE — 81001 URINALYSIS AUTO W/SCOPE: CPT | Performed by: EMERGENCY MEDICINE

## 2018-02-08 PROCEDURE — 87086 URINE CULTURE/COLONY COUNT: CPT | Performed by: EMERGENCY MEDICINE

## 2018-02-08 RX ORDER — IOPAMIDOL 755 MG/ML
500 INJECTION, SOLUTION INTRAVASCULAR ONCE
Status: COMPLETED | OUTPATIENT
Start: 2018-02-08 | End: 2018-02-08

## 2018-02-08 RX ORDER — CIPROFLOXACIN 500 MG/1
500 TABLET, FILM COATED ORAL 2 TIMES DAILY
Qty: 14 TABLET | Refills: 0 | Status: SHIPPED | OUTPATIENT
Start: 2018-02-08 | End: 2018-02-15

## 2018-02-08 RX ADMIN — IOPAMIDOL 79 ML: 755 INJECTION, SOLUTION INTRAVENOUS at 00:07

## 2018-02-08 RX ADMIN — SODIUM CHLORIDE 91 ML: 9 INJECTION, SOLUTION INTRAVENOUS at 00:07

## 2018-02-08 NOTE — ED PROVIDER NOTES
History     Chief Complaint:  Post-operative fever    HPI   Yrn Will is a 57 year old male who presents to the emergency department today for evaluation of post-operative fever of 101. The patient reports prostate surgery yesterday at [facility] by Dr. Rene due to contained cancerous site and at approximately 1800 today the patient developed a fever of 101. He reports he was told to come in if he developed a temperature greater than 100.4. He has been instructed to take 1000 mg of Tylenol for pain which he took prior to onset of the fever. He reports he has been feeling some blood clots in the area of the surgery. The patient reports 6/10 pain around the incision sites. He reports surgery was done laparoscopically with 6 incisions and he was told to keep the sites open with no dressing. He has been able to pass gas since the surgery. He denies history of DVT/PE. He endorses history of hepatitis B. He endorses dry mouth, but no shortness of breath. The patient denies chest pain, cough, runny nose, or leg pain.    Allergies:  No Known Drug Allergies     Medications:    Oxybutynin   Oxycodone   Sildenafil   Lisinopril   Omeprazole  Simvastatin      Past Medical History:    Esophageal reflux   Gilbert's disease   Gout   HTN (hypertension)   Hyperlipidemia LDL goal <130     Past Surgical History:    Biopsy prostate transrectal   Lasik surgery   Repair right hydronephrosis     Family History:    C.A.D.   Myocardial infarction  Hypertension     Social History:  Smoking Status: Never Smoker  Smokeless Tobacco: Never Used  Alcohol Use: Negative   Marital Status:   [2]     Review of Systems   Constitutional: Positive for fever (post-operative; 101).   HENT: Negative for rhinorrhea.    Respiratory: Negative for cough and shortness of breath.    Cardiovascular: Negative for chest pain.        No leg pain   All other systems reviewed and are negative.    Physical Exam     Patient Vitals for the past 24 hrs:   BP  Temp Temp src Pulse Resp SpO2   02/08/18 0130 - - - - - 93 %   02/08/18 0115 - - - - - 98 %   02/08/18 0100 - - - - - 93 %   02/08/18 0000 (!) 165/93 - - - - 94 %   02/07/18 2345 (!) 160/98 - - - - 92 %   02/07/18 2330 (!) 160/96 - - - - (!) 88 %   02/07/18 2322 - - - - - 94 %   02/07/18 2321 - - - - - 92 %   02/07/18 2320 - - - - - 92 %   02/07/18 2319 (!) 171/100 - - - - -   02/07/18 2205 (!) 165/107 98  F (36.7  C) Temporal 104 18 99 %     Physical Exam  Nursing note and vitals reviewed.  Constitutional: Cooperative.   HENT:   Mouth/Throat: Moist mucous membranes.   Eyes: EOMI, nonicteric sclera  Cardiovascular: Normal rate, regular rhythm, no murmurs, rubs, or gallops  Pulmonary/Chest: Effort normal and breath sounds normal. No respiratory distress. No wheezes. No rales.   Abdominal: Soft. Mild TTP around incisions without erythema or discharge. Incision on lower right has bruising around it.  nondistended, no guarding or rigidity. BS present.   Musculoskeletal: Normal range of motion.   Neurological: Alert. Moves all extremities spontaneously.   Skin: Skin is warm and dry. No rash noted.   Psychiatric: Normal mood and affect.       Emergency Department Course     Imaging:  Radiology findings were communicated with the patient who voiced understanding of the findings.    Chest CT, IV contrast only - PE protocol  There is no pulmonary embolus, aortic aneurysm or dissection.  Reading per radiology    Laboratory:  Laboratory findings were communicated with the patient who voiced understanding of the findings.    UA: Ketones: 20 (A), Blood: Large (A), Mucous: Present (A), WBC/HPF: 8 (H), RBC/HPF: 171 (H)  Urine Culture: Pending   Lactic Acid (Collected at 2335): 0.8  CBC: WBC 11.4 (H), HGB 13.3,   BMP: Glucose: 106 (H), Creatinine: 1.5 (H), GFR Estimate: 48 (L), Calcium: 8.4 (L)    Interventions:  2313 NS 1000 ml IV  2313 Zofran 4 mg IV    Emergency Department Course:    2210 IV was inserted and blood was  drawn for laboratory testing, results above.    2320 Nursing notes and vitals reviewed.    2323 I performed an exam of the patient as documented above.     0005 The patient provided a urine sample here in the emergency department. This was sent for laboratory testing, findings above.    0005 The patient was sent for a Chest CT, IV contrast only - PE protocol while in the emergency department, results above.     0129 I consulted with Dr. Ridley of Urology at [facility] regarding the patient's presentation and findings.     0134 I personally reviewed the imaging and laboratory results with the patient and answered all related questions prior to discharge. I discussed the treatment plan with the patient. He expressed understanding of this plan and consented to discharge. He will be discharged home with instructions for care and follow up. In addition, the patient will return to the emergency department if their symptoms persist, worsen, if new symptoms arise or if there is any concern.  All questions were answered.    Impression & Plan      Medical Decision Making:  Yrn Will is a 57 year old male who presents to the emergency department today for evaluation of postoperative fever. Pt had davinci prostatectomy yesterday. Afebrile here. Mild hypoxia on exam raises concern for pulmonary process. Incision sites appeared noninfected. UA negative. CT chest to eval for PE given his mild tachycardia and hypoxia was also negative for PE and pneumonia. No appreciable atelectasis seen as well. Fever most likely is hypermetabolic from pt's surgery yesterday. Discussed with his urology service who agrees, but also wanted him started on cipro to err on the side of caution. He is in stable condition at the time of discharge, indications for return to the ED were discussed as well as follow up. All questions were answered and he is in agreement with the plan.    Diagnosis:    ICD-10-CM    1. Postoperative fever R50.82 Urine Culture      Disposition:   The patient is discharged to home.    Discharge Medications:  Discharge Medication List as of 2/8/2018  1:56 AM      START taking these medications    Details   ciprofloxacin (CIPRO) 500 MG tablet Take 1 tablet (500 mg) by mouth 2 times daily for 7 days, Disp-14 tablet, R-0, Local Print           Scribe Disclosure:  Chi WOLFE, am serving as a scribe at 11:20 PM on 2/7/2018 to document services personally performed by Josef Brown MD based on my observations and the provider's statements to me.    [facility] EMERGENCY DEPARTMENT       Josef Brown MD  02/09/18 2020

## 2018-02-08 NOTE — ED NOTES
Pt arrives to ED with fever prostate removed yesterday, fever started 1700. A/ox 3. ABC's intact. Tylenol last 1900.

## 2018-02-09 LAB
BACTERIA SPEC CULT: NO GROWTH
Lab: NORMAL
SPECIMEN SOURCE: NORMAL

## 2018-02-12 ENCOUNTER — MYC MEDICAL ADVICE (OUTPATIENT)
Dept: PEDIATRICS | Facility: CLINIC | Age: 58
End: 2018-02-12

## 2018-02-12 DIAGNOSIS — I10 ESSENTIAL HYPERTENSION WITH GOAL BLOOD PRESSURE LESS THAN 140/90: Primary | ICD-10-CM

## 2018-02-12 RX ORDER — AMLODIPINE BESYLATE 5 MG/1
5 TABLET ORAL DAILY
Qty: 30 TABLET | Refills: 1 | Status: SHIPPED | OUTPATIENT
Start: 2018-02-12 | End: 2018-03-19

## 2018-02-12 NOTE — TELEPHONE ENCOUNTER
Discussed with pt  Elevated creatinine.  [place] has questions about alternative to ACEI  D/c lisinopril.  Start amlodipine 5mg and call in home BP's in 1-2 weeks    Also notes some redness of toe without discomfort, has questions about gout flare.  rec observation next few days and pt will call if redness/swelling/pain develops.  discussed course of prednisone (but prefer to avoid prednisone with h/o recent surgery; indomethacin not a good option with impaired renal function)

## 2018-02-13 ENCOUNTER — TRANSFERRED RECORDS (OUTPATIENT)
Dept: HEALTH INFORMATION MANAGEMENT | Facility: CLINIC | Age: 58
End: 2018-02-13

## 2018-02-16 ENCOUNTER — TRANSFERRED RECORDS (OUTPATIENT)
Dept: HEALTH INFORMATION MANAGEMENT | Facility: CLINIC | Age: 58
End: 2018-02-16

## 2018-02-18 ENCOUNTER — OFFICE VISIT (OUTPATIENT)
Dept: URGENT CARE | Facility: URGENT CARE | Age: 58
End: 2018-02-18
Payer: COMMERCIAL

## 2018-02-18 VITALS
WEIGHT: 223 LBS | HEART RATE: 100 BPM | RESPIRATION RATE: 24 BRPM | BODY MASS INDEX: 33.91 KG/M2 | SYSTOLIC BLOOD PRESSURE: 144 MMHG | DIASTOLIC BLOOD PRESSURE: 80 MMHG | OXYGEN SATURATION: 95 % | TEMPERATURE: 97.1 F

## 2018-02-18 DIAGNOSIS — M10.9 ACUTE GOUTY ARTHRITIS: ICD-10-CM

## 2018-02-18 PROCEDURE — 99213 OFFICE O/P EST LOW 20 MIN: CPT | Performed by: PHYSICIAN ASSISTANT

## 2018-02-18 RX ORDER — PREDNISONE 20 MG/1
30 TABLET ORAL DAILY
Qty: 10 TABLET | Refills: 0 | Status: SHIPPED | OUTPATIENT
Start: 2018-02-18 | End: 2018-03-19

## 2018-02-18 RX ORDER — INFLUENZA VIRUS VACCINE 15; 15; 15; 15 UG/.5ML; UG/.5ML; UG/.5ML; UG/.5ML
SUSPENSION INTRAMUSCULAR
COMMUNITY
Start: 2017-10-03 | End: 2018-03-19

## 2018-02-18 NOTE — MR AVS SNAPSHOT
After Visit Summary   2/18/2018    Yrn Will    MRN: [id number]           Patient Information     Date Of Birth          1960        Visit Information        Provider Department      2/18/2018 2:25 PM Jennifer Rankin PA-C [facility] [place]        Today's Diagnoses     Acute gouty arthritis           Follow-ups after your visit        Who to contact     If you have questions or need follow up information about today's clinic visit or your schedule please contact [facility] directly at [phone].  Normal or non-critical lab and imaging results will be communicated to you by am[employer]hart, letter or phone within 4 business days after the clinic has received the results. If you do not hear from us within 7 days, please contact the clinic through [employer] or phone. If you have a critical or abnormal lab result, we will notify you by phone as soon as possible.  Submit refill requests through [employer] or call your pharmacy and they will forward the refill request to us. Please allow 3 business days for your refill to be completed.          Additional Information About Your Visit        MyChart Information     [employer] gives you secure access to your electronic health record. If you see a primary care provider, you can also send messages to your care team and make appointments. If you have questions, please call your primary care clinic.  If you do not have a primary care provider, please call [phone] and they will assist you.        Care EveryWhere ID     This is your Care EveryWhere ID. This could be used by other organizations to access your [facility] medical records  [id number]        Your Vitals Were     Pulse Temperature Respirations Pulse Oximetry BMI (Body Mass Index)       100 97.1  F (36.2  C) 24 95% 33.91 kg/m2        Blood Pressure from Last 3 Encounters:   02/18/18 144/80   02/08/18 (!) 165/93   12/30/17 126/80    Weight from Last 3 Encounters:   02/18/18  223 lb (101.2 kg)   12/30/17 231 lb (104.8 kg)   10/04/17 223 lb (101.2 kg)              Today, you had the following     No orders found for display         Today's Medication Changes          These changes are accurate as of 2/18/18  3:40 PM.  If you have any questions, ask your nurse or doctor.               Start taking these medicines.        Dose/Directions    predniSONE 20 MG tablet   Commonly known as:  DELTASONE   Used for:  Acute gouty arthritis   Started by:  Jennifer Rankin PA-C        Dose:  30 mg   Take 1.5 tablets (30 mg) by mouth daily   Quantity:  10 tablet   Refills:  0            Where to get your medicines      These medications were sent to [place] [address] [place] [facility] [place], [place] - [address]  [address] [facility], [facility] [address]     Phone:  [phone]     predniSONE 20 MG tablet                Primary Care Provider Office Phone # Fax #    Enrico Reagan [phone]       [facility] [place] [address]        Equal Access to Services     [facility]: Hadii jose ku hadasho Soomaali, waaxda luqadaha, qaybta kaalmada adeegyada, waxliz hoffmanin haykayla dill . So [facility] [phone].    ATENCIÓN: Si habla español, tiene a bello disposición servicios gratuitos de asistencia lingüística. Llame al [phone].    We comply with applicable federal civil rights laws and [place] laws. We do not discriminate on the basis of race, color, national origin, age, disability, sex, sexual orientation, or gender identity.            Thank you!     Thank you for choosing [facility]  for your care. Our goal is always to provide you with excellent care. Hearing back from our patients is one way we can continue to improve our services. Please take a few minutes to complete the written survey that you may receive in the mail after your visit with us. Thank you!             Your Updated Medication List - Protect others around you: Learn how  to safely use, store and throw away your medicines at www.disposemymeds.org.          This list is accurate as of 2/18/18  3:40 PM.  Always use your most recent med list.                   Brand Name Dispense Instructions for use Diagnosis    amLODIPine 5 MG tablet    NORVASC    30 tablet    Take 1 tablet (5 mg) by mouth daily    Essential hypertension with goal blood pressure less than 140/90       FLUARIX QUADRIVALENT 0.5 ML injection   Generic drug:  influenza quadrivalent (PF) vacc age 3 yrs and older           omeprazole 20 MG CR capsule    priLOSEC    90 capsule    TAKE 1 CAPSULE DAILY AS NEEDED    Gastroesophageal reflux disease without esophagitis       OXYBUTYNIN CHLORIDE PO      Take 5 mg by mouth        OXYCODONE HCL PO      Take 10 mg by mouth        predniSONE 20 MG tablet    DELTASONE    10 tablet    Take 1.5 tablets (30 mg) by mouth daily    Acute gouty arthritis       REVATIO PO           simvastatin 40 MG tablet    ZOCOR    90 tablet    Take 1 tablet (40 mg) by mouth At Bedtime    Hyperlipidemia LDL goal <130

## 2018-02-18 NOTE — PROGRESS NOTES
SUBJECTIVE:  Yrn Will is a 57 year old male who comes in for gout flare    Gout     Onset: 1 day ago    Description:   Location: big toe - right  Joint Swelling: YES  Redness: YES  Pain: YES    Intensity: moderate    Progression of Symptoms:  same    Accompanying Signs & Symptoms:  Fevers: no    History:   Trauma to the area: no  Previous history of gout: YES- used Prednisone and worked well     Recent illness:  no     Precipitating factors:   Diet-rich in purine: no  Alcohol use: no   Diuretic use: no     Alleviating factors:  Not walking on it.       Therapies Tried and outcome: no therapies; Outcome - same    Past Medical History:   Diagnosis Date     Esophageal reflux 9/16/2009     Gilbert's disease      Gout 9/16/2009     HTN (hypertension) 9/16/2009     Hyperlipidemia LDL goal <130 9/16/2009     Current Outpatient Prescriptions   Medication     predniSONE (DELTASONE) 20 MG tablet     OXYBUTYNIN CHLORIDE PO     Sildenafil Citrate (REVATIO PO)     omeprazole (PRILOSEC) 20 MG CR capsule     simvastatin (ZOCOR) 40 MG tablet     FLUARIX QUADRIVALENT 0.5 ML injection     amLODIPine (NORVASC) 5 MG tablet     OXYCODONE HCL PO     No current facility-administered medications for this visit.      Social History     Social History     Marital status:      Spouse name: N/A     Number of children: N/A     Years of education: N/A     Occupational History     Not on file.     Social History Main Topics     Smoking status: Never Smoker     Smokeless tobacco: Never Used     Alcohol use No     Drug use: No     Sexual activity: Not Currently     Other Topics Concern     Not on file     Social History Narrative     Exam:  GENERAL APPEARANCE: healthy, alert and no distress  MS: right great toe with mild swelling and redness.  Tender to touch and decreased ROM and stiff  SKIN: no suspicious lesions or rashes  NEURO: Normal strength and tone, sensory exam grossly normal, mentation intact and speech  normal    assessment/plan:  (M10.9) Acute gouty arthritis  Comment:   Plan: predniSONE (DELTASONE) 20 MG tablet         Hx of gout.  Med as directed as worked well in the past.  Triggers dicussed.  FU with PCP as needed.

## 2018-02-18 NOTE — NURSING NOTE
"Chief Complaint   Patient presents with     Urgent Care     Gout flare up on right toe-sx started last night.       Initial /80 (BP Location: Right arm, Patient Position: Sitting, Cuff Size: Adult Regular)  Pulse 100  Temp 97.1  F (36.2  C)  Resp 24  Wt 223 lb (101.2 kg)  SpO2 95%  BMI 33.91 kg/m2 Estimated body mass index is 33.91 kg/(m^2) as calculated from the following:    Height as of 11/15/17: 5' 8\" (1.727 m).    Weight as of this encounter: 223 lb (101.2 kg).  Medication Reconciliation: complete   Velvet Seay CMA (AAMA)            "

## 2018-02-23 ENCOUNTER — TRANSFERRED RECORDS (OUTPATIENT)
Dept: HEALTH INFORMATION MANAGEMENT | Facility: CLINIC | Age: 58
End: 2018-02-23

## 2018-03-19 ENCOUNTER — OFFICE VISIT (OUTPATIENT)
Dept: PEDIATRICS | Facility: CLINIC | Age: 58
End: 2018-03-19
Payer: COMMERCIAL

## 2018-03-19 VITALS
WEIGHT: 217 LBS | HEART RATE: 82 BPM | SYSTOLIC BLOOD PRESSURE: 142 MMHG | DIASTOLIC BLOOD PRESSURE: 95 MMHG | HEIGHT: 68 IN | OXYGEN SATURATION: 96 % | TEMPERATURE: 98.1 F | BODY MASS INDEX: 32.89 KG/M2

## 2018-03-19 DIAGNOSIS — C61 PROSTATE CANCER (H): Primary | ICD-10-CM

## 2018-03-19 DIAGNOSIS — M10.9 GOUT, UNSPECIFIED CAUSE, UNSPECIFIED CHRONICITY, UNSPECIFIED SITE: ICD-10-CM

## 2018-03-19 DIAGNOSIS — I10 ESSENTIAL HYPERTENSION WITH GOAL BLOOD PRESSURE LESS THAN 140/90: ICD-10-CM

## 2018-03-19 DIAGNOSIS — N39.3 STRESS INCONTINENCE OF URINE: ICD-10-CM

## 2018-03-19 DIAGNOSIS — M54.50 RIGHT-SIDED LOW BACK PAIN WITHOUT SCIATICA, UNSPECIFIED CHRONICITY: ICD-10-CM

## 2018-03-19 DIAGNOSIS — R30.0 DYSURIA: ICD-10-CM

## 2018-03-19 LAB
ALBUMIN UR-MCNC: ABNORMAL MG/DL
APPEARANCE UR: CLEAR
BACTERIA #/AREA URNS HPF: ABNORMAL /HPF
BILIRUB UR QL STRIP: NEGATIVE
COLOR UR AUTO: YELLOW
GLUCOSE UR STRIP-MCNC: NEGATIVE MG/DL
HGB UR QL STRIP: NEGATIVE
KETONES UR STRIP-MCNC: NEGATIVE MG/DL
LEUKOCYTE ESTERASE UR QL STRIP: NEGATIVE
NITRATE UR QL: NEGATIVE
NON-SQ EPI CELLS #/AREA URNS LPF: ABNORMAL /LPF
PH UR STRIP: 5.5 PH (ref 5–7)
RBC #/AREA URNS AUTO: ABNORMAL /HPF
SOURCE: ABNORMAL
SP GR UR STRIP: 1.02 (ref 1–1.03)
UROBILINOGEN UR STRIP-ACNC: 0.2 EU/DL (ref 0.2–1)
WBC #/AREA URNS AUTO: ABNORMAL /HPF

## 2018-03-19 PROCEDURE — 87086 URINE CULTURE/COLONY COUNT: CPT | Performed by: INTERNAL MEDICINE

## 2018-03-19 PROCEDURE — 81001 URINALYSIS AUTO W/SCOPE: CPT | Performed by: INTERNAL MEDICINE

## 2018-03-19 PROCEDURE — 99214 OFFICE O/P EST MOD 30 MIN: CPT | Performed by: INTERNAL MEDICINE

## 2018-03-19 RX ORDER — ALLOPURINOL 300 MG/1
300 TABLET ORAL DAILY
Qty: 90 TABLET | Refills: 3 | Status: SHIPPED | OUTPATIENT
Start: 2018-03-19 | End: 2018-04-02

## 2018-03-19 RX ORDER — AMLODIPINE BESYLATE 10 MG/1
10 TABLET ORAL DAILY
Qty: 90 TABLET | Refills: 3 | Status: SHIPPED | OUTPATIENT
Start: 2018-03-19 | End: 2018-04-02

## 2018-03-19 NOTE — PATIENT INSTRUCTIONS
INSTRUCTIONS FOR TODAY:     increase amlodipine to 10mg daily   return for BP recheck in 1-2 weeks   urine culture sent   Radiology will call regarding kidney ultrasound   gout.  Start allopurinol, return for labwork in 2 months     Dr Reagan

## 2018-03-19 NOTE — MR AVS SNAPSHOT
After Visit Summary   3/19/2018    Yrn Will    MRN: [id number]           Patient Information     Date Of Birth          1960        Visit Information        Provider Department      3/19/2018 11:00 AM Enrico Reagan MD [facility]        Today's Diagnoses     Dysuria    -  1    Essential hypertension with goal blood pressure less than 140/90        Prostate cancer (H)        Right-sided low back pain without sciatica, unspecified chronicity        Gout, unspecified cause, unspecified chronicity, unspecified site          Care Instructions    INSTRUCTIONS FOR TODAY:     increase amlodipine to 10mg daily   return for BP recheck in 1-2 weeks   urine culture sent   Radiology will call regarding kidney ultrasound   gout.  Start allopurinol, return for labwork in 2 months     Dr Reagan            Follow-ups after your visit        Future tests that were ordered for you today     Open Future Orders        Priority Expected Expires Ordered    US Renal Limited Routine  3/19/2019 3/19/2018            Who to contact     If you have questions or need follow up information about today's clinic visit or your schedule please contact [facility] directly at [phone].  Normal or non-critical lab and imaging results will be communicated to you by Zonder[facility], letter or phone within 4 business days after the clinic has received the results. If you do not hear from us within 7 days, please contact the clinic through Zonderhart or phone. If you have a critical or abnormal lab result, we will notify you by phone as soon as possible.  Submit refill requests through [facility] or call your pharmacy and they will forward the refill request to us. Please allow 3 business days for your refill to be completed.          Additional Information About Your Visit        MyChart Information     [facility] gives you secure access to your electronic health record. If you see a primary care provider, you can  "also send messages to your care team and make appointments. If you have questions, please call your primary care clinic.  If you do not have a primary care provider, please call [phone] and they will assist you.        Care EveryWhere ID     This is your Care EveryWhere ID. This could be used by other organizations to access your [place] medical records  [id number]        Your Vitals Were     Pulse Temperature Height Pulse Oximetry BMI (Body Mass Index)       82 98.1  F (36.7  C) (Oral) 5' 8\" (1.727 m) 96% 32.99 kg/m2        Blood Pressure from Last 3 Encounters:   03/19/18 (!) 142/95   02/18/18 144/80   02/08/18 (!) 165/93    Weight from Last 3 Encounters:   03/19/18 217 lb (98.4 kg)   02/18/18 223 lb (101.2 kg)   12/30/17 231 lb (104.8 kg)              We Performed the Following     *UA reflex to Microscopic and Culture ([place] and [facility] (except [place] and [place])     Urine Culture Aerobic Bacterial          Today's Medication Changes          These changes are accurate as of 3/19/18 11:51 AM.  If you have any questions, ask your nurse or doctor.               Start taking these medicines.        Dose/Directions    allopurinol 300 MG tablet   Commonly known as:  ZYLOPRIM   Used for:  Gout, unspecified cause, unspecified chronicity, unspecified site   Started by:  Enrico Reagan MD        Dose:  300 mg   Take 1 tablet (300 mg) by mouth daily   Quantity:  90 tablet   Refills:  3         These medicines have changed or have updated prescriptions.        Dose/Directions    amLODIPine 10 MG tablet   Commonly known as:  NORVASC   This may have changed:    - medication strength  - how much to take   Used for:  Essential hypertension with goal blood pressure less than 140/90   Changed by:  Enrico Reagan MD        Dose:  10 mg   Take 1 tablet (10 mg) by mouth daily   Quantity:  90 tablet   Refills:  3         Stop taking these medicines if you haven't already. Please contact your care " team if you have questions.     FLUARIX QUADRIVALENT 0.5 ML injection   Generic drug:  influenza quadrivalent (PF) vacc age 3 yrs and older   Stopped by:  Enrico Reagan MD           OXYBUTYNIN CHLORIDE PO   Stopped by:  Enrico Reagan MD           OXYCODONE HCL PO   Stopped by:  Enrico Reagan MD           predniSONE 20 MG tablet   Commonly known as:  DELTASONE   Stopped by:  Enrico Reagan MD                Where to get your medicines      These medications were sent to [facility] [address] [facility] - [place], [place] - [address]  [address], [facility] [address]     Phone:  [phone]     allopurinol 300 MG tablet    amLODIPine 10 MG tablet                Primary Care Provider Office Phone # Fax #    Enrico Reagan [phone] [address] [place] [address]        Equal Access to Services     [facility]: Hadii aad ku hadasho Soomaali, waaxda luqadaha, qaybta kaalmada adeegyada, waxay idiin hayaan adeadeel dill . So [facility] [phone].    ATENCIÓN: Si habla español, tiene a bello disposición servicios gratuitos de asistencia lingüística. Llame al [phone].    We comply with applicable federal civil rights laws and [place] laws. We do not discriminate on the basis of race, color, national origin, age, disability, sex, sexual orientation, or gender identity.            Thank you!     Thank you for choosing [facility]  for your care. Our goal is always to provide you with excellent care. Hearing back from our patients is one way we can continue to improve our services. Please take a few minutes to complete the written survey that you may receive in the mail after your visit with us. Thank you!             Your Updated Medication List - Protect others around you: Learn how to safely use, store and throw away your medicines at www.disposemymeds.org.          This list is accurate as of 3/19/18 11:51 AM.  Always use your  most recent med list.                   Brand Name Dispense Instructions for use Diagnosis    allopurinol 300 MG tablet    ZYLOPRIM    90 tablet    Take 1 tablet (300 mg) by mouth daily    Gout, unspecified cause, unspecified chronicity, unspecified site       amLODIPine 10 MG tablet    NORVASC    90 tablet    Take 1 tablet (10 mg) by mouth daily    Essential hypertension with goal blood pressure less than 140/90       omeprazole 20 MG CR capsule    priLOSEC    90 capsule    TAKE 1 CAPSULE DAILY AS NEEDED    Gastroesophageal reflux disease without esophagitis       REVATIO PO           simvastatin 40 MG tablet    ZOCOR    90 tablet    Take 1 tablet (40 mg) by mouth At Bedtime    Hyperlipidemia LDL goal <130

## 2018-03-19 NOTE — PROGRESS NOTES
"  SUBJECTIVE:   Yrn Will is a 58 year old male who presents to clinic today for the following health issues:    58-year-old male with a history of Dwarf 7 prostate cancer and recent robotic prostatectomy approximately 1 month ago at [facility].  Since surgery patient has had intermittent problems with urinary incontinence often triggered by stress and changing positions.  Typically wears a pad during the day.  New onset erectile dysfunction.  Did have an issue in the postoperative period with urinary retention requiring a catheter be replaced.   Concerns today regarding intermittent dysuria.       Today states his voiding is closer to normal but still seems to have mildly reduced flow.  Also concerns regarding \"pressure sensation\" right lower back in the past few weeks.  Does have prior history of hydronephrosis on the right as a child.    Hypertension Follow-up      Outpatient blood pressures are being checked at home.  Results are 140/80.    Since most recent visit has continued amlodipine 5 mg daily-denies side effects.    Low Salt Diet: no added salt      Amount of exercise or physical activity: None    Problems taking medications regularly: No    Medication side effects: none    Diet: regular (no restrictions)      Genitourinary symptoms      Duration: month    Description:  dysuria    Intensity:  mild    Accompanying signs and symptoms (fever/discharge/nausea/vomiting/back or abdominal pain):  None    History (frequent UTI's/kidney stones/prostate problems): None  Sexually active: no     Precipitating or alleviating factors: None    Therapies tried and outcome: none   Outcome:       Patient Active Problem List   Diagnosis     Hyperlipidemia LDL goal <130     Hepatic steatosis     Gilbert's disease     Prediabetes     Advanced directives, counseling/discussion     CKD (chronic kidney disease) stage 3, GFR 30-59 ml/min     Essential hypertension with goal blood pressure less than 140/90     Gout, " "unspecified cause, unspecified chronicity, unspecified site     Gastroesophageal reflux disease without esophagitis     Obesity, unspecified obesity severity, unspecified obesity type     Prostate cancer (H)     Stress incontinence of urine     Past Surgical History:   Procedure Laterality Date     BIOPSY PROSTATE TRANSRECTAL  2017    bx showed BPH, negative for prostate cancer     LASIK  2006     repair right hydronephrosis  Age 14       Social History   Substance Use Topics     Smoking status: Never Smoker     Smokeless tobacco: Never Used     Alcohol use No     Family History   Problem Relation Age of Onset     C.A.D. Brother      stent, age 56     C.A.D. Maternal Uncle      MI in 50's     Hypertension Father      Hypertension Mother      Cancer - colorectal No family hx of      Prostate Cancer No family hx of          Current Outpatient Prescriptions   Medication Sig Dispense Refill     Amlodipine 5mg                Sildenafil Citrate (REVATIO PO)        omeprazole (PRILOSEC) 20 MG CR capsule TAKE 1 CAPSULE DAILY AS NEEDED 90 capsule 2     simvastatin (ZOCOR) 40 MG tablet Take 1 tablet (40 mg) by mouth At Bedtime 90 tablet 3     ROS: The following systems have been completely reviewed and are negative except as noted in the HPI: CONSTITUTIONAL, CARDIOVASCULAR, PULMONARY, GASTROINTESTINAL, GENITOURINARY     OBJECTIVE:                                                    BP (!) 142/95 (Cuff Size: Adult Large)  Pulse 82  Temp 98.1  F (36.7  C) (Oral)  Ht 5' 8\" (1.727 m)  Wt 217 lb (98.4 kg)  SpO2 96%  BMI 32.99 kg/m2 Body mass index is 32.99 kg/(m^2).  GENERAL:  alert,  no distress  NECK: no tenderness, no adenopathy  RESP: lungs clear to auscultation - no rales, no rhonchi, no wheezes  CV: regular rates and rhythm, normal S1 S2, no S3 or S4 and no murmur, no click or rub -  ABDOMEN: soft, no tenderness, no  hepatosplenomegaly, no masses, normal bowel sounds  Back: Without CVA tenderness.  Mild subjective " discomfort palpation over right lumbar paraspinous muscles  MS: extremities- no edema     ASSESSMENT/PLAN:                                                        ICD-10-CM    1. Prostate cancer (H) C61  recent prostatectomy.  Discussed expected course regarding urinary incontinence and erectile dysfunction.  Following up with [facility]     2. Stress incontinence of urine N39.3  continues Kegel exercises, discussed expected gradual improvement over the next 6-12 months     3. Dysuria R30.0 *UA reflex to Microscopic and Culture ([place] and [facility] (except [place] and [place])     Urine Culture Aerobic Bacterial     Urine Microscopic     Urinalysis reviewed, urine culture sent.     4. Essential hypertension with goal blood pressure less than 140/90 I10 amLODIPine (NORVASC) 10 MG tablet      Inadequate control, increase amlodipine to 10 mg and return for blood pressure recheck 1-2 weeks     5. Right-sided low back pain without sciatica, unspecified chronicity M54.5 US Renal Limited  Suspect musculoskeletal low back pain, recommended ultrasound to rule out recurrent hydronephrosis with recent history of urinary retention     6. Gout, unspecified cause, unspecified chronicity, unspecified site M10.9 allopurinol (ZYLOPRIM) 300 MG tablet  Concerns regarding recurrent gout flares.  Recent uric acid 8.1.  Recommended starting allopurinol to reduce frequency of flares, return for repeat uric acid in 2 months.        Enrico Reagan MD  [facility]

## 2018-03-20 LAB
BACTERIA SPEC CULT: NORMAL
SPECIMEN SOURCE: NORMAL

## 2018-03-23 ENCOUNTER — HOSPITAL ENCOUNTER (OUTPATIENT)
Dept: ULTRASOUND IMAGING | Facility: CLINIC | Age: 58
Discharge: HOME | End: 2018-03-23
Attending: INTERNAL MEDICINE | Admitting: INTERNAL MEDICINE
Payer: COMMERCIAL

## 2018-03-23 DIAGNOSIS — M54.50 RIGHT-SIDED LOW BACK PAIN WITHOUT SCIATICA, UNSPECIFIED CHRONICITY: ICD-10-CM

## 2018-03-23 PROCEDURE — 76775 US EXAM ABDO BACK WALL LIM: CPT

## 2018-04-02 ENCOUNTER — MYC REFILL (OUTPATIENT)
Dept: PEDIATRICS | Facility: CLINIC | Age: 58
End: 2018-04-02

## 2018-04-02 DIAGNOSIS — K21.9 GASTROESOPHAGEAL REFLUX DISEASE WITHOUT ESOPHAGITIS: ICD-10-CM

## 2018-04-02 DIAGNOSIS — M10.9 GOUT, UNSPECIFIED CAUSE, UNSPECIFIED CHRONICITY, UNSPECIFIED SITE: ICD-10-CM

## 2018-04-02 DIAGNOSIS — I10 ESSENTIAL HYPERTENSION WITH GOAL BLOOD PRESSURE LESS THAN 140/90: ICD-10-CM

## 2018-04-02 RX ORDER — ALLOPURINOL 300 MG/1
300 TABLET ORAL DAILY
Qty: 90 TABLET | Refills: 3 | Status: SHIPPED | OUTPATIENT
Start: 2018-04-02 | End: 2019-06-18

## 2018-04-02 RX ORDER — AMLODIPINE BESYLATE 10 MG/1
10 TABLET ORAL DAILY
Qty: 90 TABLET | Refills: 3 | Status: SHIPPED | OUTPATIENT
Start: 2018-04-02 | End: 2019-03-07

## 2018-04-02 NOTE — TELEPHONE ENCOUNTER
Message from MyChart:  Original authorizing provider: Enrico Reagan MD, MD Yrn Will would like a refill of the following medications:  amLODIPine (NORVASC) 10 MG tablet [Enrico Reagan MD, MD]  allopurinol (ZYLOPRIM) 300 MG tablet [Enrico Reagan MD, MD]    Preferred pharmacy: [facility] - [address]    Comment:  Hi please send prescription to [facility] for me to refill. Thanks Prasanna Will [phone]

## 2018-04-02 NOTE — TELEPHONE ENCOUNTER
"Requested Prescriptions   Pending Prescriptions Disp Refills     omeprazole (PRILOSEC) 20 MG CR capsule [Pharmacy Med Name: OMEPRAZOLE CAPS 20MG]  Last Written Prescription Date:  8/8/17  Last Fill Quantity: 90 CAPSULE,  # refills: 2   Last office visit: 3/19/2018 with prescribing provider:  JANA   Future Office Visit:     90 capsule 2     Sig: TAKE 1 CAPSULE DAILY AS NEEDED    PPI Protocol Passed    4/2/2018  2:58 PM       Passed - Not on Clopidogrel (unless Pantoprazole ordered)       Passed - No diagnosis of osteoporosis on record       Passed - Recent (12 mo) or future (30 days) visit within the authorizing provider's specialty    Patient had office visit in the last 12 months or has a visit in the next 30 days with authorizing provider or within the authorizing provider's specialty.  See \"Patient Info\" tab in inbasket, or \"Choose Columns\" in Meds & Orders section of the refill encounter.           Passed - Patient is age 18 or older          "

## 2018-04-02 NOTE — TELEPHONE ENCOUNTER
Amlodipine and Allopurinol were both ordered on 03/19/18 for a one year supply, but sent to a local pharmacy.  RXs sent to mail order pharmacy per patient request.  Patient notified.  JEANIE Coreas RN

## 2018-04-03 NOTE — TELEPHONE ENCOUNTER
Prescription approved per [facility] Refill Protocol.    Ashlie KAYE RN, BSN, PHN  [place] [facility] RN

## 2018-04-21 DIAGNOSIS — E78.5 HYPERLIPIDEMIA LDL GOAL <130: ICD-10-CM

## 2018-04-21 NOTE — TELEPHONE ENCOUNTER
"Requested Prescriptions   Pending Prescriptions Disp Refills     simvastatin (ZOCOR) 40 MG tablet [Pharmacy Med Name: SIMVASTATIN TABS 40MG]  Last Written Prescription Date:  05/31/2017  Last Fill Quantity: 90 tablet,  # refills: 3   Last office visit: 3/19/2018 with prescribing provider:  Enrico Reagan MD   Future Office Visit:     90 tablet 3     Sig: TAKE 1 TABLET AT BEDTIME    [facility]ins Protocol Passed    4/21/2018 11:08 AM       Passed - LDL on file in past 12 months    Recent Labs   Lab Test  10/04/17   0846   LDL  107*            Passed - No abnormal creatine kinase in past 12 months    No lab results found.            Passed - Recent (12 mo) or future (30 days) visit within the authorizing provider's specialty    Patient had office visit in the last 12 months or has a visit in the next 30 days with authorizing provider or within the authorizing provider's specialty.  See \"Patient Info\" tab in inbasket, or \"Choose Columns\" in Meds & Orders section of the refill encounter.           Passed - Patient is age 18 or older          "

## 2018-04-24 RX ORDER — SIMVASTATIN 40 MG
TABLET ORAL
Qty: 90 TABLET | Refills: 1 | Status: SHIPPED | OUTPATIENT
Start: 2018-04-24 | End: 2018-11-12

## 2018-04-24 NOTE — TELEPHONE ENCOUNTER
Prescription approved per [facility] Refill Protocol.    Ashlie KAYE RN, BSN, PHN  [place] [facility] RN

## 2018-05-07 ENCOUNTER — OFFICE VISIT (OUTPATIENT)
Dept: PEDIATRICS | Facility: CLINIC | Age: 58
End: 2018-05-07
Payer: COMMERCIAL

## 2018-05-07 VITALS
DIASTOLIC BLOOD PRESSURE: 80 MMHG | HEIGHT: 68 IN | BODY MASS INDEX: 33.8 KG/M2 | SYSTOLIC BLOOD PRESSURE: 130 MMHG | HEART RATE: 76 BPM | WEIGHT: 223 LBS | RESPIRATION RATE: 20 BRPM | TEMPERATURE: 98.1 F

## 2018-05-07 DIAGNOSIS — C61 PROSTATE CANCER (H): ICD-10-CM

## 2018-05-07 DIAGNOSIS — R73.03 PREDIABETES: ICD-10-CM

## 2018-05-07 DIAGNOSIS — Z00.01 ENCOUNTER FOR ROUTINE ADULT HEALTH EXAMINATION WITH ABNORMAL FINDINGS: Primary | ICD-10-CM

## 2018-05-07 DIAGNOSIS — E78.5 HYPERLIPIDEMIA LDL GOAL <130: ICD-10-CM

## 2018-05-07 DIAGNOSIS — M10.9 GOUT, UNSPECIFIED CAUSE, UNSPECIFIED CHRONICITY, UNSPECIFIED SITE: ICD-10-CM

## 2018-05-07 DIAGNOSIS — I10 ESSENTIAL HYPERTENSION WITH GOAL BLOOD PRESSURE LESS THAN 140/90: ICD-10-CM

## 2018-05-07 DIAGNOSIS — R10.11 RUQ ABDOMINAL PAIN: ICD-10-CM

## 2018-05-07 LAB
ALBUMIN SERPL-MCNC: 4.3 G/DL (ref 3.4–5)
ALBUMIN UR-MCNC: NEGATIVE MG/DL
ALP SERPL-CCNC: 112 U/L (ref 40–150)
ALT SERPL W P-5'-P-CCNC: 51 U/L (ref 0–70)
ANION GAP SERPL CALCULATED.3IONS-SCNC: 9 MMOL/L (ref 3–14)
APPEARANCE UR: CLEAR
AST SERPL W P-5'-P-CCNC: 34 U/L (ref 0–45)
BASOPHILS # BLD AUTO: 0.1 10E9/L (ref 0–0.2)
BASOPHILS NFR BLD AUTO: 1.1 %
BILIRUB SERPL-MCNC: 1.6 MG/DL (ref 0.2–1.3)
BILIRUB UR QL STRIP: NEGATIVE
BUN SERPL-MCNC: 15 MG/DL (ref 7–30)
CALCIUM SERPL-MCNC: 9.4 MG/DL (ref 8.5–10.1)
CHLORIDE SERPL-SCNC: 107 MMOL/L (ref 94–109)
CHOLEST SERPL-MCNC: 195 MG/DL
CO2 SERPL-SCNC: 24 MMOL/L (ref 20–32)
COLOR UR AUTO: YELLOW
CREAT SERPL-MCNC: 1.37 MG/DL (ref 0.66–1.25)
DIFFERENTIAL METHOD BLD: NORMAL
EOSINOPHIL # BLD AUTO: 0.7 10E9/L (ref 0–0.7)
EOSINOPHIL NFR BLD AUTO: 9.1 %
ERYTHROCYTE [DISTWIDTH] IN BLOOD BY AUTOMATED COUNT: 13.2 % (ref 10–15)
GFR SERPL CREATININE-BSD FRML MDRD: 53 ML/MIN/1.7M2
GLUCOSE SERPL-MCNC: 117 MG/DL (ref 70–99)
GLUCOSE UR STRIP-MCNC: NEGATIVE MG/DL
HBA1C MFR BLD: 5.7 % (ref 0–5.6)
HCT VFR BLD AUTO: 44.4 % (ref 40–53)
HDLC SERPL-MCNC: 42 MG/DL
HGB BLD-MCNC: 15.5 G/DL (ref 13.3–17.7)
HGB UR QL STRIP: NEGATIVE
KETONES UR STRIP-MCNC: NEGATIVE MG/DL
LDLC SERPL CALC-MCNC: 99 MG/DL
LEUKOCYTE ESTERASE UR QL STRIP: NEGATIVE
LYMPHOCYTES # BLD AUTO: 1.9 10E9/L (ref 0.8–5.3)
LYMPHOCYTES NFR BLD AUTO: 24.8 %
MCH RBC QN AUTO: 33 PG (ref 26.5–33)
MCHC RBC AUTO-ENTMCNC: 34.9 G/DL (ref 31.5–36.5)
MCV RBC AUTO: 95 FL (ref 78–100)
MONOCYTES # BLD AUTO: 1 10E9/L (ref 0–1.3)
MONOCYTES NFR BLD AUTO: 13.7 %
NEUTROPHILS # BLD AUTO: 3.8 10E9/L (ref 1.6–8.3)
NEUTROPHILS NFR BLD AUTO: 51.3 %
NITRATE UR QL: NEGATIVE
NONHDLC SERPL-MCNC: 153 MG/DL
PH UR STRIP: 6 PH (ref 5–7)
PLATELET # BLD AUTO: 190 10E9/L (ref 150–450)
POTASSIUM SERPL-SCNC: 3.8 MMOL/L (ref 3.4–5.3)
PROT SERPL-MCNC: 7.8 G/DL (ref 6.8–8.8)
PSA SERPL-MCNC: <0.01 UG/L (ref 0–4)
RBC # BLD AUTO: 4.7 10E12/L (ref 4.4–5.9)
SODIUM SERPL-SCNC: 140 MMOL/L (ref 133–144)
SOURCE: NORMAL
SP GR UR STRIP: 1.01 (ref 1–1.03)
TRIGL SERPL-MCNC: 270 MG/DL
URATE SERPL-MCNC: 3.6 MG/DL (ref 3.5–7.2)
UROBILINOGEN UR STRIP-ACNC: 0.2 EU/DL (ref 0.2–1)
WBC # BLD AUTO: 7.5 10E9/L (ref 4–11)

## 2018-05-07 PROCEDURE — 99213 OFFICE O/P EST LOW 20 MIN: CPT | Mod: 25 | Performed by: INTERNAL MEDICINE

## 2018-05-07 PROCEDURE — 36415 COLL VENOUS BLD VENIPUNCTURE: CPT | Performed by: INTERNAL MEDICINE

## 2018-05-07 PROCEDURE — 84550 ASSAY OF BLOOD/URIC ACID: CPT | Performed by: INTERNAL MEDICINE

## 2018-05-07 PROCEDURE — 81003 URINALYSIS AUTO W/O SCOPE: CPT | Performed by: INTERNAL MEDICINE

## 2018-05-07 PROCEDURE — 83036 HEMOGLOBIN GLYCOSYLATED A1C: CPT | Performed by: INTERNAL MEDICINE

## 2018-05-07 PROCEDURE — 80053 COMPREHEN METABOLIC PANEL: CPT | Performed by: INTERNAL MEDICINE

## 2018-05-07 PROCEDURE — 84153 ASSAY OF PSA TOTAL: CPT | Performed by: INTERNAL MEDICINE

## 2018-05-07 PROCEDURE — 85025 COMPLETE CBC W/AUTO DIFF WBC: CPT | Performed by: INTERNAL MEDICINE

## 2018-05-07 PROCEDURE — 80061 LIPID PANEL: CPT | Performed by: INTERNAL MEDICINE

## 2018-05-07 PROCEDURE — 99396 PREV VISIT EST AGE 40-64: CPT | Performed by: INTERNAL MEDICINE

## 2018-05-07 NOTE — PROGRESS NOTES
HPI  Reviewed and updated as needed this visit by Provider            Review of Systems      Physical Exam

## 2018-05-07 NOTE — MR AVS SNAPSHOT
After Visit Summary   5/7/2018    Yrn Will    MRN: [id number]           Patient Information     Date Of Birth          1960        Visit Information        Provider Department      5/7/2018 8:00 AM Enrico Reagan MD [facility]        Today's Diagnoses     Encounter for routine adult health examination with abnormal findings    -  1    Prediabetes        Hyperlipidemia LDL goal <130        Essential hypertension with goal blood pressure less than 140/90        Prostate cancer (H)        Gout, unspecified cause, unspecified chronicity, unspecified site        CKD (chronic kidney disease) stage 3, GFR 30-59 ml/min          Care Instructions      Preventive Health Recommendations  Male Ages 50 - 64    Yearly exam:             See your health care provider every year in order to  o   Review health changes.   o   Discuss preventive care.    o   Review your medicines if your doctor has prescribed any.     Have a cholesterol test every 5 years, or more frequently if you are at risk for high cholesterol/heart disease.     Have a diabetes test (fasting glucose) every three years. If you are at risk for diabetes, you should have this test more often.     Have a colonoscopy at age 50, or have a yearly FIT test (stool test). These exams will check for colon cancer.      Talk with your health care provider about whether or not a prostate cancer screening test (PSA) is right for you.    You should be tested each year for STDs (sexually transmitted diseases), if you re at risk.     Shots: Get a flu shot each year. Get a tetanus shot every 10 years.     Nutrition:    Eat at least 5 servings of fruits and vegetables daily.     Eat whole-grain bread, whole-wheat pasta and brown rice instead of white grains and rice.     Talk to your provider about Calcium and Vitamin D.     Lifestyle    Exercise for at least 150 minutes a week (30 minutes a day, 5 days a week). This will help you control your  "weight and prevent disease.     Limit alcohol to one drink per day.     No smoking.     Wear sunscreen to prevent skin cancer.     See your dentist every six months for an exam and cleaning.     See your eye doctor every 1 to 2 years.            Follow-ups after your visit        Who to contact     If you have questions or need follow up information about today's clinic visit or your schedule please contact [facility] directly at [phone].  Normal or non-critical lab and imaging results will be communicated to you by [employer], letter or phone within 4 business days after the clinic has received the results. If you do not hear from us within 7 days, please contact the clinic through [employer] or phone. If you have a critical or abnormal lab result, we will notify you by phone as soon as possible.  Submit refill requests through [employer] or call your pharmacy and they will forward the refill request to us. Please allow 3 business days for your refill to be completed.          Additional Information About Your Visit        [employer] Information     [employer] gives you secure access to your electronic health record. If you see a primary care provider, you can also send messages to your care team and make appointments. If you have questions, please call your primary care clinic.  If you do not have a primary care provider, please call [phone] and they will assist you.        Care EveryWhere ID     This is your Care EveryWhere ID. This could be used by other organizations to access your [place] medical records  [id number]        Your Vitals Were     Pulse Temperature Respirations Height BMI (Body Mass Index)       76 98.1  F (36.7  C) (Oral) 20 5' 8\" (1.727 m) 33.91 kg/m2        Blood Pressure from Last 3 Encounters:   05/07/18 130/80   03/19/18 (!) 142/95   02/18/18 144/80    Weight from Last 3 Encounters:   05/07/18 223 lb (101.2 kg)   03/19/18 217 lb (98.4 kg)   02/18/18 223 lb (101.2 kg)            "   We Performed the Following     CBC with platelets differential     Comprehensive metabolic panel     Hemoglobin A1c     Lipid panel reflex to direct LDL Fasting     PSA, tumor marker     UA reflex to Microscopic     Uric acid        Primary Care Provider Office Phone # Fax #    Enrico Reagan [phone] [address] [place] [address]        Equal Access to Services     [facility]: Hadii aad ku hadasho Soomaali, waaxda luqadaha, qaybta kaalmada adeegyada, waxay idiin hayaan adeeg ravinder lacuban . So [facility] [phone].    ATENCIÓN: Si habla español, tiene a bello disposición servicios gratuitos de asistencia lingüística. Llame al [phone].    We comply with applicable federal civil rights laws and [place] laws. We do not discriminate on the basis of race, color, national origin, age, disability, sex, sexual orientation, or gender identity.            Thank you!     Thank you for choosing [facility]  for your care. Our goal is always to provide you with excellent care. Hearing back from our patients is one way we can continue to improve our services. Please take a few minutes to complete the written survey that you may receive in the mail after your visit with us. Thank you!             Your Updated Medication List - Protect others around you: Learn how to safely use, store and throw away your medicines at www.disposemymeds.org.          This list is accurate as of 5/7/18  8:17 AM.  Always use your most recent med list.                   Brand Name Dispense Instructions for use Diagnosis    allopurinol 300 MG tablet    ZYLOPRIM    90 tablet    Take 1 tablet (300 mg) by mouth daily    Gout, unspecified cause, unspecified chronicity, unspecified site       amLODIPine 10 MG tablet    NORVASC    90 tablet    Take 1 tablet (10 mg) by mouth daily    Essential hypertension with goal blood pressure less than 140/90       omeprazole 20 MG CR capsule    priLOSEC     90 capsule    TAKE 1 CAPSULE DAILY AS NEEDED    Gastroesophageal reflux disease without esophagitis       REVATIO PO           simvastatin 40 MG tablet    ZOCOR    90 tablet    TAKE 1 TABLET AT BEDTIME    Hyperlipidemia LDL goal <130

## 2018-05-07 NOTE — PROGRESS NOTES
SUBJECTIVE:   CC: Yrn Will is an 58 year old male who presents for preventative health visit.     Healthy Habits:    Do you get at least three servings of calcium containing foods daily (dairy, green leafy vegetables, etc.)? yes    Amount of exercise or daily activities, outside of work: 2-3 day(s) per week    Problems taking medications regularly No    Medication side effects: yes. Flush and dehydration    Have you had an eye exam in the past two years? yes    Do you see a dentist twice per year? yes    Do you have sleep apnea, excessive snoring or daytime drowsiness?no     Concerns today:  Presents for routine physical.       1)Concerns regarding right lower quadrant discomfort intermittent for the past few months.  History of prior da Sky prostatectomy at [facility].  Due for a follow-up PSA today.  Occasional urinary frequency, denies dysuria.  Denies melena hematochezia constipation or other stool changes.    Today's PHQ-2 Score:   PHQ-2 ( 1999 Pfizer) 10/4/2017 5/31/2017   Q1: Little interest or pleasure in doing things 0 0   Q2: Feeling down, depressed or hopeless 0 0   PHQ-2 Score 0 0   Q1: Little interest or pleasure in doing things Not at all -   Q2: Feeling down, depressed or hopeless Not at all -   PHQ-2 Score 0 -       Abuse: Current or Past(Physical, Sexual or Emotional)- No  Do you feel safe in your environment - Yes    Social History   Substance Use Topics     Smoking status: Never Smoker     Smokeless tobacco: Never Used     Alcohol use No      If you drink alcohol do you typically have >3 drinks per day or >7 drinks per week? no                      Last PSA:   PSA   Date Value Ref Range Status   10/04/2017 8.73 (H) 0 - 4 ug/L Final     Comment:     Assay Method:  Chemiluminescence using Siemens Vista analyzer       Reviewed orders with patient. Reviewed health maintenance and updated orders accordingly - Yes    Patient Active Problem List   Diagnosis     Hyperlipidemia LDL goal <130      Hepatic steatosis     Gilbert's disease     Prediabetes     Advanced directives, counseling/discussion     CKD (chronic kidney disease) stage 3, GFR 30-59 ml/min     Essential hypertension with goal blood pressure less than 140/90     Gout, unspecified cause, unspecified chronicity, unspecified site     Gastroesophageal reflux disease without esophagitis     Obesity, unspecified obesity severity, unspecified obesity type     Prostate cancer (H)     Stress incontinence of urine     Past Medical History:   Diagnosis Date     Esophageal reflux 9/16/2009     Gilbert's disease      Gout 9/16/2009     HTN (hypertension) 9/16/2009     Hyperlipidemia LDL goal <130 9/16/2009       Past Surgical History:   Procedure Laterality Date     BIOPSY PROSTATE TRANSRECTAL  2017    bx showed BPH, negative for prostate cancer     DAVINCI PROSTATECTOMY  02/2018     LASIK  2006     repair right hydronephrosis  Age 14       Family History   Problem Relation Age of Onset     C.A.D. Brother      stent, age 56     C.A.D. Maternal Uncle      MI in 50's     Hypertension Father      Hypertension Mother      Cancer - colorectal No family hx of      Prostate Cancer No family hx of        ALLERGIES   No Known Allergies      Reviewed and updated as needed this visit by clinical staff  Tobacco  Allergies  Meds  Med Hx  Surg Hx  Fam Hx  Soc Hx        Reviewed and updated as needed this visit by Provider            ROS:  C: NEGATIVE for fever, chills, change in weight  I: NEGATIVE for worrisome rashes, moles or lesions  E: NEGATIVE for vision changes or irritation  ENT: NEGATIVE for ear, mouth and throat problems  R: NEGATIVE for significant cough or SOB  CV: NEGATIVE for chest pain, palpitations or peripheral edema  GI: NEGATIVE for nausea,  heartburn, or change in bowel habits   male: negative for dysuria, hematuria, decreased urinary stream  M: NEGATIVE for significant arthralgias or myalgia  N: NEGATIVE for weakness, dizziness or  "paresthesias  P: NEGATIVE for changes in mood or affect    OBJECTIVE:   /80  Pulse 76  Temp 98.1  F (36.7  C) (Oral)  Resp 20  Ht 5' 8\" (1.727 m)  Wt 223 lb (101.2 kg)  BMI 33.91 kg/m2  EXAM:  GENERAL: alert and no distress  EYES: Eyes grossly normal to inspection, PERRL and conjunctivae and sclerae normal  HENT: ear canals and TM's normal, nose and mouth without ulcers or lesions  NECK: no adenopathy, no asymmetry, masses, or scars and thyroid normal to palpation  RESP: lungs clear to auscultation - no rales, rhonchi or wheezes  CV: regular rate and rhythm, normal S1 S2, no S3 or S4, no murmur, click or rub, no peripheral edema and peripheral pulses strong  ABDOMEN: soft/mild distention, mild guarding/tender to palpation right upper and lower quadrants without other peritoneal signs.  No hepatosplenomegaly, no masses and bowel sounds normal.    MS: no gross musculoskeletal defects noted, no edema  SKIN: no suspicious lesions or rashes  NEURO: Normal strength and tone, mentation intact and speech normal  PSYCH: mentation appears normal, affect normal/bright    ASSESSMENT/PLAN:       ICD-10-CM    1. Encounter for routine adult health examination with abnormal findings Z00.01        2. Prediabetes R73.03 Hemoglobin A1c     Check A1c, discussed weight management.     3. RUQ abdominal pain R10.11 CT Abdomen Pelvis w Contrast      Mild guarding on exam, without other peritoneal signs.  Intermittent discomfort for the past 2-3 months.  Plan for imaging (CT)  Will contact patient with results as they return.     4. Hyperlipidemia LDL goal <130 E78.5 Lipid panel reflex to direct LDL Fasting     Comprehensive metabolic panel     5. Essential hypertension with goal blood pressure less than 140/90 I10  adequate blood pressure control.  Does have periodic facial flushing likely secondary to amlodipine     6. Prostate cancer (H) C61 PSA, tumor marker     UA reflex to Microscopic      Prior daVinci prostatectomy, due " "for follow-up lab work     7. Gout, unspecified cause, unspecified chronicity, unspecified site M10.9 CBC with platelets differential     Uric acid      Continue allopurinol, due for lab work.       COUNSELING:  Reviewed preventive health counseling, as reflected in patient instructions       Regular exercise       Healthy diet/nutrition       Colon cancer screening       reports that he has never smoked. He has never used smokeless tobacco.    Estimated body mass index is 33.91 kg/(m^2) as calculated from the following:    Height as of this encounter: 5' 8\" (1.727 m).    Weight as of this encounter: 223 lb (101.2 kg).   Weight management plan: Discussed healthy diet and exercise guidelines and patient will follow up in 12 months in clinic to re-evaluate.    Counseling Resources:  ATP IV Guidelines  Pooled Cohorts Equation Calculator  FRAX Risk Assessment  ICSI Preventive Guidelines  Dietary Guidelines for Americans, 2010  USDA's MyPlate  ASA Prophylaxis  Lung CA Screening    Enrico Reagan MD, MD  [facility]  "

## 2018-05-14 ENCOUNTER — MYC MEDICAL ADVICE (OUTPATIENT)
Dept: PEDIATRICS | Facility: CLINIC | Age: 58
End: 2018-05-14

## 2018-05-15 ENCOUNTER — HOSPITAL ENCOUNTER (OUTPATIENT)
Dept: CT IMAGING | Facility: CLINIC | Age: 58
Discharge: HOME | End: 2018-05-15
Attending: INTERNAL MEDICINE | Admitting: INTERNAL MEDICINE
Payer: COMMERCIAL

## 2018-05-15 DIAGNOSIS — R10.11 RUQ ABDOMINAL PAIN: ICD-10-CM

## 2018-05-15 PROCEDURE — 25000128 H RX IP 250 OP 636: Performed by: INTERNAL MEDICINE

## 2018-05-15 PROCEDURE — 74177 CT ABD & PELVIS W/CONTRAST: CPT

## 2018-05-15 RX ORDER — IOPAMIDOL 755 MG/ML
500 INJECTION, SOLUTION INTRAVASCULAR ONCE
Status: COMPLETED | OUTPATIENT
Start: 2018-05-15 | End: 2018-05-15

## 2018-05-15 RX ADMIN — IOPAMIDOL 100 ML: 755 INJECTION, SOLUTION INTRAVENOUS at 08:40

## 2018-05-15 RX ADMIN — SODIUM CHLORIDE 65 ML: 9 INJECTION, SOLUTION INTRAVENOUS at 08:40

## 2018-05-20 PROBLEM — K80.20 CALCULUS OF GALLBLADDER WITHOUT CHOLECYSTITIS: Status: ACTIVE | Noted: 2018-05-20

## 2018-09-07 DIAGNOSIS — K21.9 GASTROESOPHAGEAL REFLUX DISEASE WITHOUT ESOPHAGITIS: ICD-10-CM

## 2018-09-07 NOTE — TELEPHONE ENCOUNTER
"Requested Prescriptions   Pending Prescriptions Disp Refills     omeprazole (PRILOSEC) 20 MG CR capsule [Pharmacy Med Name: OMEPRAZOLE DR CAPS 20MG]  Last Written Prescription Date:  4/3/2018  Last Fill Quantity: 90 capsule,  # refills: 1   Last Office Visit: 5/7/2018   Future Office Visit:    90 capsule 1     Sig: TAKE 1 CAPSULE DAILY AS NEEDED    PPI Protocol Passed    9/7/2018 11:55 AM       Passed - Not on Clopidogrel (unless Pantoprazole ordered)       Passed - No diagnosis of osteoporosis on record       Passed - Recent (12 mo) or future (30 days) visit within the authorizing provider's specialty    Patient had office visit in the last 12 months or has a visit in the next 30 days with authorizing provider or within the authorizing provider's specialty.  See \"Patient Info\" tab in inbasket, or \"Choose Columns\" in Meds & Orders section of the refill encounter.           Passed - Patient is age 18 or older          "

## 2018-09-10 NOTE — TELEPHONE ENCOUNTER
[place]ill [place] through [facility] [place].  Sadia Donis RN  [facility] [place]  [phone]

## 2018-10-17 ENCOUNTER — ALLIED HEALTH/NURSE VISIT (OUTPATIENT)
Dept: PEDIATRICS | Facility: CLINIC | Age: 58
End: 2018-10-17

## 2018-10-17 ENCOUNTER — ALLIED HEALTH/NURSE VISIT (OUTPATIENT)
Dept: PEDIATRICS | Facility: CLINIC | Age: 58
End: 2018-10-17
Payer: COMMERCIAL

## 2018-10-17 VITALS — SYSTOLIC BLOOD PRESSURE: 138 MMHG | DIASTOLIC BLOOD PRESSURE: 78 MMHG

## 2018-10-17 DIAGNOSIS — Z01.30 BP CHECK: Primary | ICD-10-CM

## 2018-10-17 DIAGNOSIS — Z53.9 ERRONEOUS ENCOUNTER--DISREGARD: Primary | ICD-10-CM

## 2018-10-17 PROCEDURE — 99207 ZZC NO CHARGE NURSE ONLY: CPT | Performed by: INTERNAL MEDICINE

## 2018-10-17 NOTE — MR AVS SNAPSHOT
After Visit Summary   10/17/2018    Yrn Will    MRN: [id number]           Patient Information     Date Of Birth          1960        Visit Information        Provider Department      10/17/2018 4:16 PM Enrico Reagan MD [facility]        Today's Diagnoses     ERRONEOUS ENCOUNTER--DISREGARD    -  1       Follow-ups after your visit        Who to contact     If you have questions or need follow up information about today's clinic visit or your schedule please contact [facility] directly at [phone].  Normal or non-critical lab and imaging results will be communicated to you by [employer], letter or phone within 4 business days after the clinic has received the results. If you do not hear from us within 7 days, please contact the clinic through [employer] or phone. If you have a critical or abnormal lab result, we will notify you by phone as soon as possible.  Submit refill requests through [email or web address] or call your pharmacy and they will forward the refill request to us. Please allow 3 business days for your refill to be completed.          Additional Information About Your Visit        MyChart Information     [email or web address] gives you secure access to your electronic health record. If you see a primary care provider, you can also send messages to your care team and make appointments. If you have questions, please call your primary care clinic.  If you do not have a primary care provider, please call [phone] and they will assist you.        Care EveryWhere ID     This is your Care EveryWhere ID. This could be used by other organizations to access your [place] medical records  [id number]         Blood Pressure from Last 3 Encounters:   10/17/18 138/78   05/07/18 130/80   03/19/18 (!) 142/95    Weight from Last 3 Encounters:   05/07/18 223 lb (101.2 kg)   03/19/18 217 lb (98.4 kg)   02/18/18 223 lb (101.2 kg)              Today, you had the following     No orders found  for display       Primary Care Provider Office Phone # Fax #    Enrico Reagan [phone] [address] [place] [address]        Equal Access to Services     SATURNINOJOSIE ANTWON : Hadmario jose pino jackyo Gisel, waaxda luqadaha, qaybta kaalmada kristin, oxana vanegassukhwinder lauren. So [facility] [phone].    ATENCIÓN: Si habla español, tiene a bello disposición servicios gratuitos de asistencia lingüística. Llame al [phone].    We comply with applicable federal civil rights laws and [place] laws. We do not discriminate on the basis of race, color, national origin, age, disability, sex, sexual orientation, or gender identity.            Thank you!     Thank you for choosing [facility]  for your care. Our goal is always to provide you with excellent care. Hearing back from our patients is one way we can continue to improve our services. Please take a few minutes to complete the written survey that you may receive in the mail after your visit with us. Thank you!             Your Updated Medication List - Protect others around you: Learn how to safely use, store and throw away your medicines at www.disposemymeds.org.          This list is accurate as of 10/17/18 11:59 PM.  Always use your most recent med list.                   Brand Name Dispense Instructions for use Diagnosis    allopurinol 300 MG tablet    ZYLOPRIM    90 tablet    Take 1 tablet (300 mg) by mouth daily    Gout, unspecified cause, unspecified chronicity, unspecified site       amLODIPine 10 MG tablet    NORVASC    90 tablet    Take 1 tablet (10 mg) by mouth daily    Essential hypertension with goal blood pressure less than 140/90       omeprazole 20 MG CR capsule    priLOSEC    90 capsule    TAKE 1 CAPSULE DAILY AS NEEDED    Gastroesophageal reflux disease without esophagitis       REVATIO PO           simvastatin 40 MG tablet    ZOCOR    90 tablet    TAKE 1 TABLET AT BEDTIME     Hyperlipidemia LDL goal <130

## 2018-10-17 NOTE — PROGRESS NOTES
Yrn Will is enrolled/participating in the retail pharmacy Blood Pressure Goals Achievement Program (BPGAP).  Yrn Will was evaluated at [facility] on October 17, 2018 at which time his blood pressure was:    BP Readings from Last 3 Encounters:   10/17/18 138/78   05/07/18 130/80   03/19/18 (!) 142/95     Reviewed lifestyle modifications for blood pressure control and reduction: including making healthy food choices, managing weight, getting regular exercise, smoking cessation, reducing alcohol consumption, monitoring blood pressure regularly.     Yrn Will is not experiencing symptoms.    Follow-Up: BP is at goal of < 140/90mmHg (patient 18+ years of age with or without diabetes).  Recommended follow-up at pharmacy in 6 months.     Recommendation to Provider: n/a    Yrn Will was evaluated for enrollment into the PGEN study today.    PLEASE INITIATE ENROLLMENT DISCUSSION WITH HTN PTS  1) Between 30-80 years old                                                                                                               2) BMI between 19-50                                                                                                        3) BP ?140/90 AND ?170/110 patients aged 30-59         BP ?150/90 AND ?170/110 non-diabetic patients aged 60-80       BP ?140/90 AND ?170/110 diabetic patients aged 60-80  4) Additional requirements for uncontrolled HTN patients:        Pt on only 1 class of medication  5) EXCLUDE patient if confirmation of:                  ? Cardiac disease                  ? Chronic Kidney Disease                  ? Pregnancy/Breastfeeding                  ? Secondary Hypertension/Pre-eclampsia                                 ? Vascular disease    Patient eligible for enrollment:  No  Patient interested in enrollment:  No    This note completed by: Thanks,  Vitor Maharaj, PharmD  [facility]

## 2018-10-17 NOTE — MR AVS SNAPSHOT
After Visit Summary   10/17/2018    Yrn Will    MRN: [id number]           Patient Information     Date Of Birth          1960        Visit Information        Provider Department      10/17/2018 4:16 PM Enrico Reagan MD [facility]        Today's Diagnoses     BP check    -  1       Follow-ups after your visit        Your next 10 appointments already scheduled     Oct 18, 2018 10:00 AM CDT   LAB with EA LAB   [facility] ([facility])    [address]  [address]  [place] [facility] [address] [phone]           Please do not eat 10-12 hours before your appointment if you are coming in fasting for labs on lipids, cholesterol, or glucose (sugar). This does not apply to pregnant women. Water, hot tea and black coffee (with nothing added) are okay. Do not drink other fluids, diet soda or chew gum.              Who to contact     If you have questions or need follow up information about today's clinic visit or your schedule please contact [facility] directly at [phone].  Normal or non-critical lab and imaging results will be communicated to you by [employer], letter or phone within 4 business days after the clinic has received the results. If you do not hear from us within 7 days, please contact the clinic through [employer] or phone. If you have a critical or abnormal lab result, we will notify you by phone as soon as possible.  Submit refill requests through [employer] or call your pharmacy and they will forward the refill request to us. Please allow 3 business days for your refill to be completed.          Additional Information About Your Visit        [employer] Information     [employer] gives you secure access to your electronic health record. If you see a primary care provider, you can also send messages to your care team and make appointments. If you have questions, please call your primary care clinic.  If you do not have a primary  care provider, please call [phone] and they will assist you.        Care EveryWhere ID     This is your Care EveryWhere ID. This could be used by other organizations to access your [facility] medical records  [id number]         Blood Pressure from Last 3 Encounters:   10/17/18 138/78   05/07/18 130/80   03/19/18 (!) 142/95    Weight from Last 3 Encounters:   05/07/18 223 lb (101.2 kg)   03/19/18 217 lb (98.4 kg)   02/18/18 223 lb (101.2 kg)              Today, you had the following     No orders found for display       Primary Care Provider Office Phone # Fax #    Enrico Reagan [phone] [address] [place] [address]        Equal Access to Services     [facility]: Hadii aad odette hadasho Soomaali, waaxda luqadaha, qaybta kaalmada adeegyada, waxliz rehman haykayla dill . So [facility] [phone].    ATENCIÓN: Si habla español, tiene a bello disposición servicios gratuitos de asistencia lingüística. Llame al [phone].    We comply with applicable federal civil rights laws and [place] laws. We do not discriminate on the basis of race, color, national origin, age, disability, sex, sexual orientation, or gender identity.            Thank you!     [facility]  [facility]. Our goal is always to provide you with excellent care. Hearing back from our patients is one way we can continue to improve our services. Please take a few minutes to complete the written survey that you may receive in the mail after your visit with us. Thank you!             Your Updated Medication List - Protect others around you: Learn how to safely use, store and throw away your medicines at www.disposemymeds.org.          This list is accurate as of 10/17/18  4:17 PM.  Always use your most recent med list.                   Brand Name Dispense Instructions for use Diagnosis    allopurinol 300 MG tablet    ZYLOPRIM    90 tablet    Take 1 tablet (300  mg) by mouth daily    Gout, unspecified cause, unspecified chronicity, unspecified site       amLODIPine 10 MG tablet    NORVASC    90 tablet    Take 1 tablet (10 mg) by mouth daily    Essential hypertension with goal blood pressure less than 140/90       omeprazole 20 MG CR capsule    priLOSEC    90 capsule    TAKE 1 CAPSULE DAILY AS NEEDED    Gastroesophageal reflux disease without esophagitis       REVATIO PO           simvastatin 40 MG tablet    ZOCOR    90 tablet    TAKE 1 TABLET AT BEDTIME    Hyperlipidemia LDL goal <130

## 2018-10-18 ENCOUNTER — TELEPHONE (OUTPATIENT)
Dept: PEDIATRICS | Facility: CLINIC | Age: 58
End: 2018-10-18

## 2018-10-18 DIAGNOSIS — C61 PROSTATE CANCER (H): ICD-10-CM

## 2018-10-18 DIAGNOSIS — C61 PROSTATE CANCER (H): Primary | ICD-10-CM

## 2018-10-18 PROCEDURE — G0103 PSA SCREENING: HCPCS | Performed by: INTERNAL MEDICINE

## 2018-10-18 NOTE — TELEPHONE ENCOUNTER
Pt is here for a PSA lab and there are no orders in his chart.  Pt informs  that he has this drawn frequently.    Order placed.    Chloe Ortiz RN

## 2018-10-19 LAB — PSA SERPL-ACNC: <0.01 UG/L (ref 0–4)

## 2018-11-12 DIAGNOSIS — E78.5 HYPERLIPIDEMIA LDL GOAL <130: ICD-10-CM

## 2018-11-12 RX ORDER — SIMVASTATIN 40 MG
TABLET ORAL
Qty: 90 TABLET | Refills: 1 | Status: SHIPPED | OUTPATIENT
Start: 2018-11-12 | End: 2019-06-18

## 2018-11-12 NOTE — TELEPHONE ENCOUNTER
"Requested Prescriptions   Pending Prescriptions Disp Refills     simvastatin (ZOCOR) 40 MG tablet [Pharmacy Med Name: SIMVASTATIN TABS 40MG]  Last Written Prescription Date:  04/24/2018  Last Fill Quantity: 90 tablet,  # refills: 1   Last office visit: 5/7/2018 with prescribing provider:  Enrico Reagan MD    Future Office Visit:     90 tablet 1     Sig: TAKE 1 TABLET AT BEDTIME    Statins Protocol Passed    11/12/2018  5:17 PM       Passed - LDL on file in past 12 months    Recent Labs   Lab Test  05/07/18   0828   LDL  99            Passed - No abnormal creatine kinase in past 12 months    No lab results found.            Passed - Recent (12 mo) or future (30 days) visit within the authorizing provider's specialty    Patient had office visit in the last 12 months or has a visit in the next 30 days with authorizing provider or within the authorizing provider's specialty.  See \"Patient Info\" tab in inbasket, or \"Choose Columns\" in Meds & Orders section of the refill encounter.             Passed - Patient is age 18 or older          "

## 2018-11-13 NOTE — TELEPHONE ENCOUNTER
Prescription approved per [facility], [facility] or [facility] refill protocol.  Blessing Ladd RN - Triage  [facility]

## 2018-11-14 ENCOUNTER — TELEPHONE (OUTPATIENT)
Dept: OTHER | Facility: CLINIC | Age: 58
End: 2018-11-14

## 2019-03-07 DIAGNOSIS — I10 ESSENTIAL HYPERTENSION WITH GOAL BLOOD PRESSURE LESS THAN 140/90: ICD-10-CM

## 2019-03-07 NOTE — TELEPHONE ENCOUNTER
"Requested Prescriptions   Pending Prescriptions Disp Refills     amLODIPine (NORVASC) 10 MG tablet [Pharmacy Med Name: AMLODIPINE BESYLATE TABS 10MG]    Last Written Prescription Date:  4/2/2018  Last Fill Quantity: 90,  # refills: 3   Last office visit: 5/7/2018 with prescribing provider:  Enrico Reagan     Future Office Visit:     90 tablet 3     Sig: TAKE 1 TABLET DAILY    Calcium Channel Blockers Protocol  Failed - 3/7/2019  2:45 PM       Failed - Normal serum creatinine on file in past 12 months    Recent Labs   Lab Test 05/07/18  0828   CR 1.37*            Passed - Blood pressure under 140/90 in past 12 months    BP Readings from Last 3 Encounters:   10/17/18 138/78   05/07/18 130/80   03/19/18 (!) 142/95                Passed - Recent (12 mo) or future (30 days) visit within the authorizing provider's specialty    Patient had office visit in the last 12 months or has a visit in the next 30 days with authorizing provider or within the authorizing provider's specialty.  See \"Patient Info\" tab in inbasket, or \"Choose Columns\" in Meds & Orders section of the refill encounter.             Passed - Medication is active on med list       Passed - Patient is age 18 or older        '  "

## 2019-03-08 NOTE — TELEPHONE ENCOUNTER
Routing refill request to provider for review/approval because:  Labs out of range:  Creatinine  Due for physical and labs in May  Blessing WARE RN - Triage  [facility]

## 2019-03-09 RX ORDER — AMLODIPINE BESYLATE 10 MG/1
TABLET ORAL
Qty: 90 TABLET | Refills: 1 | Status: SHIPPED | OUTPATIENT
Start: 2019-03-09 | End: 2019-06-18

## 2019-05-30 ENCOUNTER — ALLIED HEALTH/NURSE VISIT (OUTPATIENT)
Dept: PEDIATRICS | Facility: CLINIC | Age: 59
End: 2019-05-30

## 2019-05-30 VITALS — SYSTOLIC BLOOD PRESSURE: 138 MMHG | DIASTOLIC BLOOD PRESSURE: 79 MMHG

## 2019-05-30 DIAGNOSIS — I10 ESSENTIAL HYPERTENSION WITH GOAL BLOOD PRESSURE LESS THAN 140/90: Primary | ICD-10-CM

## 2019-05-30 PROCEDURE — 99207 ZZC NO CHARGE NURSE ONLY: CPT | Performed by: INTERNAL MEDICINE

## 2019-05-30 NOTE — PROGRESS NOTES
Yrn Will was evaluated at [facility] on May 30, 2019 at which time his blood pressure was:    BP Readings from Last 3 Encounters:   05/30/19 138/79   10/17/18 138/78   05/07/18 130/80     Pulse Readings from Last 3 Encounters:   05/07/18 76   03/19/18 82   02/18/18 100       Reviewed lifestyle modifications for blood pressure control and reduction: including making healthy food choices, managing weight, getting regular exercise, smoking cessation, reducing alcohol consumption, monitoring blood pressure regularly.     Symptoms: None    BP Goal:< 140/90 mmHg    BP Assessment:  BP at goal    Potential Reasons for BP too high: NA - Not applicable    BP Follow-Up Plan: Recheck BP in 6 months at pharmacy    Recommendation to Provider: n/a    Note completed by: Mary Woodard Pharmacist

## 2019-06-07 ENCOUNTER — DOCUMENTATION ONLY (OUTPATIENT)
Dept: PEDIATRICS | Facility: CLINIC | Age: 59
End: 2019-06-07

## 2019-06-07 DIAGNOSIS — E78.5 HYPERLIPIDEMIA LDL GOAL <130: Primary | ICD-10-CM

## 2019-06-07 DIAGNOSIS — M10.9 GOUT, UNSPECIFIED CAUSE, UNSPECIFIED CHRONICITY, UNSPECIFIED SITE: ICD-10-CM

## 2019-06-07 DIAGNOSIS — C61 PROSTATE CANCER (H): ICD-10-CM

## 2019-06-10 DIAGNOSIS — M10.9 GOUT, UNSPECIFIED CAUSE, UNSPECIFIED CHRONICITY, UNSPECIFIED SITE: ICD-10-CM

## 2019-06-10 DIAGNOSIS — C61 PROSTATE CANCER (H): ICD-10-CM

## 2019-06-10 DIAGNOSIS — E78.5 HYPERLIPIDEMIA LDL GOAL <130: ICD-10-CM

## 2019-06-10 LAB
ALBUMIN SERPL-MCNC: 4.1 G/DL (ref 3.4–5)
ALP SERPL-CCNC: 149 U/L (ref 40–150)
ALT SERPL W P-5'-P-CCNC: 63 U/L (ref 0–70)
ANION GAP SERPL CALCULATED.3IONS-SCNC: 9 MMOL/L (ref 3–14)
AST SERPL W P-5'-P-CCNC: 44 U/L (ref 0–45)
BASOPHILS # BLD AUTO: 0.1 10E9/L (ref 0–0.2)
BASOPHILS NFR BLD AUTO: 1 %
BILIRUB SERPL-MCNC: 2.2 MG/DL (ref 0.2–1.3)
BUN SERPL-MCNC: 14 MG/DL (ref 7–30)
CALCIUM SERPL-MCNC: 8.8 MG/DL (ref 8.5–10.1)
CHLORIDE SERPL-SCNC: 105 MMOL/L (ref 94–109)
CHOLEST SERPL-MCNC: 195 MG/DL
CO2 SERPL-SCNC: 26 MMOL/L (ref 20–32)
CREAT SERPL-MCNC: 1.48 MG/DL (ref 0.66–1.25)
DIFFERENTIAL METHOD BLD: ABNORMAL
EOSINOPHIL # BLD AUTO: 0.4 10E9/L (ref 0–0.7)
EOSINOPHIL NFR BLD AUTO: 4.6 %
ERYTHROCYTE [DISTWIDTH] IN BLOOD BY AUTOMATED COUNT: 13.1 % (ref 10–15)
GFR SERPL CREATININE-BSD FRML MDRD: 50 ML/MIN/{1.73_M2}
GLUCOSE SERPL-MCNC: 118 MG/DL (ref 70–99)
HCT VFR BLD AUTO: 48.2 % (ref 40–53)
HDLC SERPL-MCNC: 48 MG/DL
HGB BLD-MCNC: 17.2 G/DL (ref 13.3–17.7)
LDLC SERPL CALC-MCNC: 110 MG/DL
LYMPHOCYTES # BLD AUTO: 1.9 10E9/L (ref 0.8–5.3)
LYMPHOCYTES NFR BLD AUTO: 25 %
MCH RBC QN AUTO: 33.6 PG (ref 26.5–33)
MCHC RBC AUTO-ENTMCNC: 35.7 G/DL (ref 31.5–36.5)
MCV RBC AUTO: 94 FL (ref 78–100)
MONOCYTES # BLD AUTO: 0.9 10E9/L (ref 0–1.3)
MONOCYTES NFR BLD AUTO: 10.9 %
NEUTROPHILS # BLD AUTO: 4.5 10E9/L (ref 1.6–8.3)
NEUTROPHILS NFR BLD AUTO: 58.5 %
NONHDLC SERPL-MCNC: 147 MG/DL
PLATELET # BLD AUTO: 182 10E9/L (ref 150–450)
POTASSIUM SERPL-SCNC: 3.8 MMOL/L (ref 3.4–5.3)
PROT SERPL-MCNC: 7.8 G/DL (ref 6.8–8.8)
PSA SERPL-MCNC: <0.01 UG/L (ref 0–4)
RBC # BLD AUTO: 5.12 10E12/L (ref 4.4–5.9)
SODIUM SERPL-SCNC: 140 MMOL/L (ref 133–144)
TRIGL SERPL-MCNC: 187 MG/DL
URATE SERPL-MCNC: 5.5 MG/DL (ref 3.5–7.2)
WBC # BLD AUTO: 7.8 10E9/L (ref 4–11)

## 2019-06-10 PROCEDURE — 80053 COMPREHEN METABOLIC PANEL: CPT | Performed by: INTERNAL MEDICINE

## 2019-06-10 PROCEDURE — 85025 COMPLETE CBC W/AUTO DIFF WBC: CPT | Performed by: INTERNAL MEDICINE

## 2019-06-10 PROCEDURE — 36415 COLL VENOUS BLD VENIPUNCTURE: CPT | Performed by: INTERNAL MEDICINE

## 2019-06-10 PROCEDURE — 80061 LIPID PANEL: CPT | Performed by: INTERNAL MEDICINE

## 2019-06-10 PROCEDURE — 84153 ASSAY OF PSA TOTAL: CPT | Performed by: INTERNAL MEDICINE

## 2019-06-10 PROCEDURE — 84550 ASSAY OF BLOOD/URIC ACID: CPT | Performed by: INTERNAL MEDICINE

## 2019-06-18 ENCOUNTER — OFFICE VISIT (OUTPATIENT)
Dept: PEDIATRICS | Facility: CLINIC | Age: 59
End: 2019-06-18
Payer: COMMERCIAL

## 2019-06-18 VITALS
HEART RATE: 75 BPM | SYSTOLIC BLOOD PRESSURE: 140 MMHG | WEIGHT: 230.4 LBS | OXYGEN SATURATION: 96 % | TEMPERATURE: 98.8 F | DIASTOLIC BLOOD PRESSURE: 90 MMHG | RESPIRATION RATE: 16 BRPM | BODY MASS INDEX: 35.03 KG/M2

## 2019-06-18 DIAGNOSIS — I10 ESSENTIAL HYPERTENSION WITH GOAL BLOOD PRESSURE LESS THAN 140/90: ICD-10-CM

## 2019-06-18 DIAGNOSIS — E78.5 HYPERLIPIDEMIA LDL GOAL <130: ICD-10-CM

## 2019-06-18 DIAGNOSIS — N18.30 CKD (CHRONIC KIDNEY DISEASE) STAGE 3, GFR 30-59 ML/MIN (H): ICD-10-CM

## 2019-06-18 DIAGNOSIS — M10.9 GOUT, UNSPECIFIED CAUSE, UNSPECIFIED CHRONICITY, UNSPECIFIED SITE: ICD-10-CM

## 2019-06-18 DIAGNOSIS — K21.9 GASTROESOPHAGEAL REFLUX DISEASE WITHOUT ESOPHAGITIS: ICD-10-CM

## 2019-06-18 DIAGNOSIS — Z12.11 COLON CANCER SCREENING: ICD-10-CM

## 2019-06-18 DIAGNOSIS — C61 PROSTATE CANCER (H): ICD-10-CM

## 2019-06-18 DIAGNOSIS — Z00.00 ROUTINE GENERAL MEDICAL EXAMINATION AT A HEALTH CARE FACILITY: Primary | ICD-10-CM

## 2019-06-18 DIAGNOSIS — R73.03 PREDIABETES: ICD-10-CM

## 2019-06-18 PROCEDURE — 99396 PREV VISIT EST AGE 40-64: CPT | Performed by: INTERNAL MEDICINE

## 2019-06-18 RX ORDER — AMLODIPINE BESYLATE 10 MG/1
10 TABLET ORAL DAILY
Qty: 90 TABLET | Refills: 3 | Status: SHIPPED | OUTPATIENT
Start: 2019-06-18 | End: 2020-05-15

## 2019-06-18 RX ORDER — LOSARTAN POTASSIUM 25 MG/1
25 TABLET ORAL DAILY
Qty: 30 TABLET | Refills: 1 | Status: SHIPPED | OUTPATIENT
Start: 2019-06-18 | End: 2019-08-08

## 2019-06-18 RX ORDER — SIMVASTATIN 40 MG
40 TABLET ORAL AT BEDTIME
Qty: 90 TABLET | Refills: 3 | Status: SHIPPED | OUTPATIENT
Start: 2019-06-18 | End: 2019-08-04

## 2019-06-18 RX ORDER — ALLOPURINOL 300 MG/1
300 TABLET ORAL DAILY
Qty: 90 TABLET | Refills: 3 | Status: CANCELLED | OUTPATIENT
Start: 2019-06-18

## 2019-06-18 SDOH — SOCIAL STABILITY: SOCIAL NETWORK: HOW OFTEN DO YOU ATTENT MEETINGS OF THE CLUB OR ORGANIZATION YOU BELONG TO?: NEVER

## 2019-06-18 SDOH — SOCIAL STABILITY: SOCIAL NETWORK
IN A TYPICAL WEEK, HOW MANY TIMES DO YOU TALK ON THE PHONE WITH FAMILY, FRIENDS, OR NEIGHBORS?: MORE THAN THREE TIMES A WEEK

## 2019-06-18 SDOH — SOCIAL STABILITY: SOCIAL NETWORK: ARE YOU MARRIED, WIDOWED, DIVORCED, SEPARATED, NEVER MARRIED, OR LIVING WITH A PARTNER?: DIVORCED

## 2019-06-18 SDOH — SOCIAL STABILITY: SOCIAL NETWORK
DO YOU BELONG TO ANY CLUBS OR ORGANIZATIONS SUCH AS CHURCH GROUPS UNIONS, FRATERNAL OR ATHLETIC GROUPS, OR SCHOOL GROUPS?: NO

## 2019-06-18 SDOH — HEALTH STABILITY: PHYSICAL HEALTH: ON AVERAGE, HOW MANY DAYS PER WEEK DO YOU ENGAGE IN MODERATE TO STRENUOUS EXERCISE (LIKE A BRISK WALK)?: 2 DAYS

## 2019-06-18 SDOH — SOCIAL STABILITY: SOCIAL NETWORK: HOW OFTEN DO YOU ATTEND CHURCH OR RELIGIOUS SERVICES?: MORE THAN 4 TIMES PER YEAR

## 2019-06-18 SDOH — SOCIAL STABILITY: SOCIAL NETWORK: HOW OFTEN DO YOU GET TOGETHER WITH FRIENDS OR RELATIVES?: ONCE A WEEK

## 2019-06-18 SDOH — HEALTH STABILITY: PHYSICAL HEALTH: ON AVERAGE, HOW MANY MINUTES DO YOU ENGAGE IN EXERCISE AT THIS LEVEL?: 30 MIN

## 2019-06-18 ASSESSMENT — ENCOUNTER SYMPTOMS
FEVER: 0
EYE PAIN: 0
CONSTIPATION: 0
ABDOMINAL PAIN: 0
HEMATOCHEZIA: 0
DIARRHEA: 0
HEMATURIA: 0
CHILLS: 0
NERVOUS/ANXIOUS: 0
COUGH: 0
DIZZINESS: 0

## 2019-06-18 NOTE — PATIENT INSTRUCTIONS
High blood pressure:      Continue amlodipine 10mg daily       Add losartan 25mg daily       Return for BP recheck with nurse in 2-3 weeks and have your kidney blood test redrawn    Preventive Health Recommendations  Male Ages 50 - 64    Yearly exam:             See your health care provider every year in order to  o   Review health changes.   o   Discuss preventive care.    o   Review your medicines if your doctor has prescribed any.     Have a cholesterol test every 5 years, or more frequently if you are at risk for high cholesterol/heart disease.     Have a diabetes test (fasting glucose) every three years. If you are at risk for diabetes, you should have this test more often.     Have a colonoscopy at age 50, or have a yearly FIT test (stool test). These exams will check for colon cancer.      Talk with your health care provider about whether or not a prostate cancer screening test (PSA) is right for you.    You should be tested each year for STDs (sexually transmitted diseases), if you re at risk.     Shots: Get a flu shot each year. Get a tetanus shot every 10 years.     Nutrition:    Eat at least 5 servings of fruits and vegetables daily.     Eat whole-grain bread, whole-wheat pasta and brown rice instead of white grains and rice.     Get adequate Calcium and Vitamin D.     Lifestyle    Exercise for at least 150 minutes a week (30 minutes a day, 5 days a week). This will help you control your weight and prevent disease.     Limit alcohol to one drink per day.     No smoking.     Wear sunscreen to prevent skin cancer.     See your dentist every six months for an exam and cleaning.     See your eye doctor every 1 to 2 years.    Obesity: Weight Loss Strategies:     Self-monitoring is important and a major factor in successful weight loss.    Examples include Managed Methodspal.com or  loseit.com or other calorie tracking apps or programs  Or an accurate bathroom scale, checking weight a few times per week    Start  tracking calories:  Daily calorie goals for successful weight loss:  Women: 1200-1500Kcal/day  Men 1600-1800Kcal/day    Start with reducing your diet by 250-500Kcal daily for 1 week at a time  (for example:   reduce 2500Kcal/day diet to 2000Kcal/day diet for 1-2 weeks then try to restrict further)    Examples of effective diets or weight loss plans:      Reduced calorie   (Weight Watchers, Rosibel Jed, Nutrisystem)      Low to moderate carbohydrate restrictive diet  ([place], Zone, Paleo)      Low fat   (American Heart Association diet)      Meal Replacement diet (liquid meals, bars)    Healthy diet:  Increased vegetable and fruit intake with goal of high-fiber diet and plenty of whole grains.  Recommend increasing water intake and decreasing intake of any sugary beverages in the diet.   Dietary fiber: Increasing dietary fiber is helpful in that it makes you feel more full and is less likely to produce blood sugar spikes compared to simple carbohydrates.  Daily recommendation for fiber intake: Women 25 g/day, Men 36 g/day plenty of fresh fruits and vegetables throughout the day of the best way to have adequate dietary fiber.    Diet should be rich in non-starchy vegetables, fruit nuts as well as fish lean protein whole grains and legumes.  Vegetables oils such as olive oil are a healthier alternative.  Recommend trying to avoid refined flour and refrain grains, simple sugars in the diet and added sugars as well as highly processed red meats and high sodium processed and packaged food.    Exercise is important for losing weight, but diet changes and calorie restriction should be the focus.  Exercise goals  Maintenance health maintenance goal 150 minutes of moderate exercise per week, or 30 minutes 5 days/week  Preventing weight gain: 150-300 minutes of moderate exercise per week.  Preventing weight regain after obtaining target weight: Requires 300-420 minutes of exercise per week, or 60 minutes 5  days/week    Once the goal weight is achieved, exercise and an active lifestyle plays an important part in preventing recurrent weight gain:       Guidelines to prevent the weight you have lost:  60 min of moderate activity at least 5 days per week (or more).    Adequate sleep is essential to a successfuly weight loss plan.  Adequate sleep is very important with regard to maintaining a healthy weight.  In general most people need at least 7 hours of sleep per night.  Making a goal of 7 hours per night of uninterrupted sleep is as important as diet changes and regular exercise.

## 2019-06-18 NOTE — PROGRESS NOTES
SUBJECTIVE:   CC: Yrn Will is an 59 year old male who presents for preventative health visit.     Healthy Habits:     Getting at least 3 servings of Calcium per day:  Yes    Bi-annual eye exam:  Yes    Dental care twice a year:  NO    Sleep apnea or symptoms of sleep apnea:  None    Diet:  Regular (no restrictions)    Frequency of exercise:  2-3 days/week    Duration of exercise:  30-45 minutes    Taking medications regularly:  Yes    Medication side effects:  Other    PHQ-2 Total Score: 0    Additional concerns today:  No      Today's PHQ-2 Score:   PHQ-2 ( 1999 Pfizer) 6/18/2019   Q1: Little interest or pleasure in doing things 0   Q2: Feeling down, depressed or hopeless 0   PHQ-2 Score 0   Q1: Little interest or pleasure in doing things Not at all   Q2: Feeling down, depressed or hopeless Not at all   PHQ-2 Score 0       Abuse: Current or Past(Physical, Sexual or Emotional)- No  Do you feel safe in your environment? Yes    Social History     Tobacco Use     Smoking status: Never Smoker     Smokeless tobacco: Never Used   Substance Use Topics     Alcohol use: No     Alcohol/week: 0.0 oz       Alcohol Use 6/18/2019   Prescreen: >3 drinks/day or >7 drinks/week? Not Applicable   Prescreen: >3 drinks/day or >7 drinks/week? -       Last PSA:   PSA   Date Value Ref Range Status   06/10/2019 <0.01 0 - 4 ug/L Final     Comment:     Assay Method:  Chemiluminescence using Siemens Vista analyzer       Reviewed orders with patient. Reviewed health maintenance and updated orders accordingly - Yes      Reviewed and updated as needed this visit by clinical staff  Tobacco  Allergies  Meds  Med Hx  Surg Hx  Fam Hx  Soc Hx        Reviewed and updated as needed this visit by Provider  Meds          Patient Active Problem List   Diagnosis     Hyperlipidemia LDL goal <130     Hepatic steatosis     Gilbert's disease     Prediabetes     Advanced directives, counseling/discussion     CKD (chronic kidney disease) stage 3, GFR  30-59 ml/min (H)     Essential hypertension with goal blood pressure less than 140/90     Gout, unspecified cause, unspecified chronicity, unspecified site     Gastroesophageal reflux disease without esophagitis     Obesity, unspecified obesity severity, unspecified obesity type     Prostate cancer (H)     Stress incontinence of urine     Calculus of gallbladder without cholecystitis     Past Medical History:   Diagnosis Date     Esophageal reflux 9/16/2009     Gilbert's disease      Gout 9/16/2009     HTN (hypertension) 9/16/2009     Hyperlipidemia LDL goal <130 9/16/2009       Past Surgical History:   Procedure Laterality Date     BIOPSY PROSTATE TRANSRECTAL  2017    bx showed BPH, negative for prostate cancer     DAVINCI PROSTATECTOMY  02/2018     LASIK  2006     repair right hydronephrosis  Age 14       Family History   Problem Relation Age of Onset     C.A.D. Brother         stent, age 56     C.A.D. Maternal Uncle         MI in 50's     Hypertension Father      Hypertension Mother      Cancer - colorectal No family hx of      Prostate Cancer No family hx of        ALLERGIES   No Known Allergies      Review of Systems   Constitutional: Negative for chills and fever.   HENT: Negative for congestion and ear pain.    Eyes: Negative for pain.   Respiratory: Negative for cough.    Cardiovascular: Negative for chest pain.   Gastrointestinal: Negative for abdominal pain, constipation, diarrhea and hematochezia.   Genitourinary: Negative for hematuria.   Neurological: Negative for dizziness.   Psychiatric/Behavioral: The patient is not nervous/anxious.      Current Outpatient Medications   Medication Sig Dispense Refill     amLODIPine (NORVASC) 10 MG tablet Take 1 tablet (10 mg) by mouth daily 90 tablet 3             omeprazole (PRILOSEC) 20 MG DR capsule Take 1 capsule (20 mg) by mouth daily as needed 90 capsule 3     Sildenafil Citrate (REVATIO PO)        simvastatin (ZOCOR) 40 MG tablet Take 1 tablet (40 mg) by mouth  At Bedtime 90 tablet 3        OBJECTIVE:   /90 (BP Location: Right arm, Patient Position: Sitting, Cuff Size: Adult Large)   Pulse 75   Temp 98.8  F (37.1  C) (Oral)   Resp 16   Wt 104.5 kg (230 lb 6.4 oz)   SpO2 96%   BMI 35.03 kg/m      Physical Exam  GENERAL: healthy, alert and no distress  EYES: Eyes grossly normal to inspection, PERRL and conjunctivae and sclerae normal  HENT: ear canals and TM's normal, nose and mouth without ulcers or lesions  NECK: no adenopathy, no asymmetry, masses, or scars and thyroid normal to palpation  RESP: lungs clear to auscultation - no rales, rhonchi or wheezes  CV: regular rate and rhythm, normal S1 S2, no S3 or S4, no murmur, click or rub, no peripheral edema and peripheral pulses strong  ABDOMEN: soft, nontender, no hepatosplenomegaly, no masses and bowel sounds normal  MS: no gross musculoskeletal defects noted, no edema  SKIN: no suspicious lesions or rashes  NEURO: Normal strength and tone, mentation intact and speech normal  PSYCH: mentation appears normal, affect normal/bright    ASSESSMENT/PLAN:       ICD-10-CM    1. Routine general medical examination at a health care facility Z00.00   Colon cancer screening.  Due for 10 yr follow-up (last scope in 2009)     2. Essential hypertension with goal blood pressure less than 140/90 I10 amLODIPine (NORVASC) 10 MG tablet     losartan (COZAAR) 25 MG tablet     Inadequate control, add losartan and rtc 2-3 weeks for BP recheck and rpt BMP     3. CKD (chronic kidney disease) stage 3, GFR 30-59 ml/min (H) N18.3 Basic metabolic panel  Stable, prior imaging 2018 reviewed     4. Prediabetes R73.03 Discussed weight mgmt.   Goal weight: 215lbs     5. Prostate cancer (H) C61 H/o prostatectomy.  Labs reviewed:  Lab Results   Component Value Date    PSA <0.01 06/10/2019        6. Hyperlipidemia LDL goal <130 E78.5 simvastatin (ZOCOR) 40 MG tablet  Lab Results   Component Value Date     06/10/2019   Adequate control,  "continue current rx     7. Gout, unspecified cause, unspecified chronicity, unspecified site M10.9 Stopped allopurinol (no SE, didn't want to take it).   No recent flare     8. Gastroesophageal reflux disease without esophagitis K21.9 omeprazole (PRILOSEC) 20 MG DR capsule     refill       COUNSELING:   Reviewed preventive health counseling, as reflected in patient instructions       Regular exercise       Healthy diet/nutrition       Colon cancer screening    Estimated body mass index is 35.03 kg/m  as calculated from the following:    Height as of 5/7/18: 1.727 m (5' 8\").    Weight as of this encounter: 104.5 kg (230 lb 6.4 oz).     Weight management plan: Discussed healthy diet and exercise guidelines     reports that he has never smoked. He has never used smokeless tobacco.      Counseling Resources:  ATP IV Guidelines  Pooled Cohorts Equation Calculator  FRAX Risk Assessment  ICSI Preventive Guidelines  Dietary Guidelines for Americans, 2010  USDA's MyPlate  ASA Prophylaxis  Lung CA Screening    Enrico Reagan MD, MD  [facility]  "

## 2019-07-08 ENCOUNTER — ALLIED HEALTH/NURSE VISIT (OUTPATIENT)
Dept: NURSING | Facility: CLINIC | Age: 59
End: 2019-07-08
Payer: COMMERCIAL

## 2019-07-08 VITALS — DIASTOLIC BLOOD PRESSURE: 89 MMHG | SYSTOLIC BLOOD PRESSURE: 131 MMHG | HEART RATE: 74 BPM

## 2019-07-08 DIAGNOSIS — N18.30 CKD (CHRONIC KIDNEY DISEASE) STAGE 3, GFR 30-59 ML/MIN (H): ICD-10-CM

## 2019-07-08 DIAGNOSIS — I10 ESSENTIAL HYPERTENSION WITH GOAL BLOOD PRESSURE LESS THAN 140/90: Primary | ICD-10-CM

## 2019-07-08 LAB
ANION GAP SERPL CALCULATED.3IONS-SCNC: 9 MMOL/L (ref 3–14)
BUN SERPL-MCNC: 14 MG/DL (ref 7–30)
CALCIUM SERPL-MCNC: 9.1 MG/DL (ref 8.5–10.1)
CHLORIDE SERPL-SCNC: 108 MMOL/L (ref 94–109)
CO2 SERPL-SCNC: 25 MMOL/L (ref 20–32)
CREAT SERPL-MCNC: 1.62 MG/DL (ref 0.66–1.25)
GFR SERPL CREATININE-BSD FRML MDRD: 45 ML/MIN/{1.73_M2}
GLUCOSE SERPL-MCNC: 119 MG/DL (ref 70–99)
POTASSIUM SERPL-SCNC: 4.1 MMOL/L (ref 3.4–5.3)
SODIUM SERPL-SCNC: 142 MMOL/L (ref 133–144)

## 2019-07-08 PROCEDURE — 36415 COLL VENOUS BLD VENIPUNCTURE: CPT | Performed by: INTERNAL MEDICINE

## 2019-07-08 PROCEDURE — 80048 BASIC METABOLIC PNL TOTAL CA: CPT | Performed by: INTERNAL MEDICINE

## 2019-07-08 PROCEDURE — 99207 ZZC NO CHARGE NURSE ONLY: CPT

## 2019-07-08 NOTE — PROGRESS NOTES
Yrn Will is a 59 year old patient who comes in today for a Blood Pressure check.  Initial BP:  /89   Pulse 74      74  Disposition: results routed to provider  RICHELLE Mann

## 2019-08-02 DIAGNOSIS — E78.5 HYPERLIPIDEMIA LDL GOAL <130: ICD-10-CM

## 2019-08-02 DIAGNOSIS — K21.9 GASTROESOPHAGEAL REFLUX DISEASE WITHOUT ESOPHAGITIS: ICD-10-CM

## 2019-08-02 NOTE — TELEPHONE ENCOUNTER
"Requests are from a new Pharmacy.    Requested Prescriptions   Pending Prescriptions Disp Refills     omeprazole (PRILOSEC) 20 MG DR capsule  Last Written Prescription Date:  06/18/2019  Last Fill Quantity: 90 capsule,  # refills: 3   Last Office Visit: 6/18/2019 Enrico Reagan MD   Future Office Visit:      90 capsule 3     Sig: Take 1 capsule (20 mg) by mouth daily as needed       PPI Protocol Passed - 8/2/2019  2:41 PM        Passed - Not on Clopidogrel (unless Pantoprazole ordered)        Passed - No diagnosis of osteoporosis on record        Passed - Recent (12 mo) or future (30 days) visit within the authorizing provider's specialty     Patient had office visit in the last 12 months or has a visit in the next 30 days with authorizing provider or within the authorizing provider's specialty.  See \"Patient Info\" tab in inbasket, or \"Choose Columns\" in Meds & Orders section of the refill encounter.              Passed - Medication is active on med list        Passed - Patient is age 18 or older        simvastatin (ZOCOR) 40 MG tablet  Last Written Prescription Date:  06/18/2019  Last Fill Quantity: 90 tablet,  # refills: 3   Last Office Visit: 6/18/2019 Enrico Reagan MD   Future Office Visit:      90 tablet 3     Sig: Take 1 tablet (40 mg) by mouth At Bedtime       Statins Protocol Passed - 8/2/2019  2:41 PM        Passed - LDL on file in past 12 months     Recent Labs   Lab Test 06/10/19  0923   *             Passed - No abnormal creatine kinase in past 12 months     No lab results found.             Passed - Recent (12 mo) or future (30 days) visit within the authorizing provider's specialty     Patient had office visit in the last 12 months or has a visit in the next 30 days with authorizing provider or within the authorizing provider's specialty.  See \"Patient Info\" tab in inbasket, or \"Choose Columns\" in Meds & Orders section of the refill encounter.              Passed - Medication is " active on med list        Passed - Patient is age 18 or older

## 2019-08-04 RX ORDER — SIMVASTATIN 40 MG
40 TABLET ORAL AT BEDTIME
Qty: 90 TABLET | Refills: 3 | Status: SHIPPED | OUTPATIENT
Start: 2019-08-04 | End: 2020-05-15

## 2019-08-04 NOTE — TELEPHONE ENCOUNTER
Prescription approved per [facility] [place] [place] Refill Protocol.    Lupis Kramer RN   8/4/2019  12:52 PM

## 2019-08-06 DIAGNOSIS — I10 ESSENTIAL HYPERTENSION WITH GOAL BLOOD PRESSURE LESS THAN 140/90: ICD-10-CM

## 2019-08-07 ENCOUNTER — TELEPHONE (OUTPATIENT)
Dept: PEDIATRICS | Facility: CLINIC | Age: 59
End: 2019-08-07

## 2019-08-07 ENCOUNTER — HOSPITAL ENCOUNTER (OUTPATIENT)
Facility: CLINIC | Age: 59
Discharge: HOME | End: 2019-08-07
Attending: INTERNAL MEDICINE | Admitting: INTERNAL MEDICINE
Payer: COMMERCIAL

## 2019-08-07 VITALS
OXYGEN SATURATION: 96 % | WEIGHT: 225 LBS | HEIGHT: 68 IN | RESPIRATION RATE: 14 BRPM | SYSTOLIC BLOOD PRESSURE: 127 MMHG | DIASTOLIC BLOOD PRESSURE: 90 MMHG | BODY MASS INDEX: 34.1 KG/M2 | HEART RATE: 68 BPM

## 2019-08-07 LAB — COLONOSCOPY: NORMAL

## 2019-08-07 PROCEDURE — 88305 TISSUE EXAM BY PATHOLOGIST: CPT | Performed by: INTERNAL MEDICINE

## 2019-08-07 PROCEDURE — G0500 MOD SEDAT ENDO SERVICE >5YRS: HCPCS | Performed by: INTERNAL MEDICINE

## 2019-08-07 PROCEDURE — 45385 COLONOSCOPY W/LESION REMOVAL: CPT | Mod: PT | Performed by: INTERNAL MEDICINE

## 2019-08-07 PROCEDURE — 99153 MOD SED SAME PHYS/QHP EA: CPT | Performed by: INTERNAL MEDICINE

## 2019-08-07 PROCEDURE — 88305 TISSUE EXAM BY PATHOLOGIST: CPT | Mod: 26,59 | Performed by: INTERNAL MEDICINE

## 2019-08-07 PROCEDURE — 25000128 H RX IP 250 OP 636: Performed by: INTERNAL MEDICINE

## 2019-08-07 RX ORDER — NALOXONE HYDROCHLORIDE 0.4 MG/ML
.1-.4 INJECTION, SOLUTION INTRAMUSCULAR; INTRAVENOUS; SUBCUTANEOUS
Status: DISCONTINUED | OUTPATIENT
Start: 2019-08-07 | End: 2019-08-07 | Stop reason: HOSPADM

## 2019-08-07 RX ORDER — LIDOCAINE 40 MG/G
CREAM TOPICAL
Status: DISCONTINUED | OUTPATIENT
Start: 2019-08-07 | End: 2019-08-07 | Stop reason: HOSPADM

## 2019-08-07 RX ORDER — FLUMAZENIL 0.1 MG/ML
0.2 INJECTION, SOLUTION INTRAVENOUS
Status: DISCONTINUED | OUTPATIENT
Start: 2019-08-07 | End: 2019-08-07 | Stop reason: HOSPADM

## 2019-08-07 RX ORDER — ONDANSETRON 2 MG/ML
4 INJECTION INTRAMUSCULAR; INTRAVENOUS
Status: DISCONTINUED | OUTPATIENT
Start: 2019-08-07 | End: 2019-08-07 | Stop reason: HOSPADM

## 2019-08-07 RX ORDER — ONDANSETRON 2 MG/ML
4 INJECTION INTRAMUSCULAR; INTRAVENOUS EVERY 6 HOURS PRN
Status: DISCONTINUED | OUTPATIENT
Start: 2019-08-07 | End: 2019-08-07 | Stop reason: HOSPADM

## 2019-08-07 RX ORDER — FENTANYL CITRATE 50 UG/ML
INJECTION, SOLUTION INTRAMUSCULAR; INTRAVENOUS PRN
Status: DISCONTINUED | OUTPATIENT
Start: 2019-08-07 | End: 2019-08-07 | Stop reason: HOSPADM

## 2019-08-07 RX ORDER — ONDANSETRON 4 MG/1
4 TABLET, ORALLY DISINTEGRATING ORAL EVERY 6 HOURS PRN
Status: DISCONTINUED | OUTPATIENT
Start: 2019-08-07 | End: 2019-08-07 | Stop reason: HOSPADM

## 2019-08-07 ASSESSMENT — MIFFLIN-ST. JEOR: SCORE: 1800.09

## 2019-08-07 NOTE — DISCHARGE INSTRUCTIONS
Understanding Colon and Rectal Polyps     The colon has a smooth lining composed of millions of cells.     The colon (also called the large intestine) is a muscular tube that forms the last part of the digestive tract. It absorbs water and stores food waste. The colon is about 4 to 6 feet long. The rectum is the last 6 inches of the colon. The colon and rectum have a smooth lining composed of millions of cells. Changes in these cells can lead to growths in the colon that can become cancerous and should be removed.     When the Colon Lining Changes  Changes that occur in the cells that line the colon or rectum can lead to growths called polyps. Over a period of years, polyps can turn cancerous. Removing polyps early may prevent cancer from ever forming.      Polyps  Polyps are fleshy clumps of tissue that form on the lining of the colon or rectum. Small polyps are usually benign (not cancerous). However, over time, cells in a polyp can change and become cancerous. The larger a polyp grows, the more likely this is to happen. Also, certain types of polyps known as adenomatous polyps are considered premalignant. This means that they will almost always become cancerous if they re not removed.          Cancer  Almost all colorectal cancers start when polyp cells begin growing abnormally. As a cancerous tumor grows, it may involve more and more of the colon or rectum. In time, cancer can also grow beyond the colon or rectum and spread to nearby organs or to glands called lymph nodes. The cells can also travel to other parts of the body. This is known as metastasis. The earlier a cancerous tumor is removed, the better the chance of preventing its spread.        [id number] [facility], [address] [place] [address], [place] [address]. All rights reserved. This information is not intended as a substitute for professional medical care. Always follow your healthcare professional's instructions.

## 2019-08-07 NOTE — TELEPHONE ENCOUNTER
Prior Authorization Retail Medication Request    Medication/Dose:   ICD code (if different than what is on RX):  I10  Previously Tried and Failed:  Lisinopril   Rationale:  Patient has elevated creatinine levels, cannot take Lisinopril to regulate blood pressure due to medical reasons.     Insurance Name:  [employer] [place] [employer]   Insurance ID:  [id number]       Pharmacy Information (if different than what is on RX)  Name:  [facility] [id number] [facility]  Phone:  [phone]

## 2019-08-07 NOTE — LETTER
July 17, 2019      Yrn Will  [address]  [facility] [address]        Dear Yrn,           Thank you for choosing [facility]. You are scheduled for the following service(s).   Please be aware that coverage of these services is subject to the terms and limitations of your health insurance plan.  Call member services at your health plan with any benefit or coverage questions.  Date:  8-7-19       Procedure: COLONOSCOPY  Doctor:   Jono         Arrival Time:  0815   *check in at Emergency/Endoscopy desk*  Procedure Time:  0845    Location:   [facility]        Endoscopy Department, First Floor (Enter through ER Doors) *         [address] [place], [place] [address]      [phone] or [phone] ([facility]) to reschedule        NuLYTELY  PREP  Colonoscopy is the most accurate test to detect colon polyps and colon cancer; and the only test where polyps can be removed. During this procedure, a doctor examines the lining of your large intestine and rectum through a flexible tube.         Transportation  You must arrange for a ride for the day of your procedure with a responsible adult. A taxi , Uber, etc, is not an option unless you are accompanied by a responsible adult. If you fail to arrange transportation with a responsible adult, your procedure will be cancelled and rescheduled.    Fill your enclosed prescription for NuLYTELY  at your local pharmacy. Please call our office at [phone] if you did not receive a prescription.    COLONOSCOPY PRE-PROCEDURE CHECKLIST  If you have diabetes, ask your regular doctor for diet and medication restrictions.  If you take an anticoagulant or anti-platelet medication (such as Coumadin , Lovenox , Pradaxa , Xarelto , Eliquis , etc.), please call your primary doctor for advice on holding this medication.  If you take aspirin you may continue to do so.  If you are or may be pregnant, please discuss the  risks and benefits of this procedure with your doctor.    PREPARATION FOR COLONOSCOPY    7 days before:    Discontinue fiber supplements and medications containing iron. This includes Metamucil  and Fibercon ; and multivitamins with iron.  3 days before:    Begin a low-fiber diet. A low-fiber diet helps making the cleanout more effective. For additional details on low-fiber diet, please refer to the table on the last page.  2 days before:    Continue the low-fiber diet.     Drink at least 8 glasses of water throughout the day.     Stop eating solid foods at 11:45 pm.  1. 1 day before:    In the morning: begin a clear liquid diet (liquids you can see through).     Examples of a clear liquid diet include: water, clear broth or bouillon, Gatorade, Pedialyte or Powerade, carbonated and non-carbonated soft drinks (Sprite , 7-Up , ginger ale), strained fruit juices without pulp (apple, white grape, white cranberry), Jell-O  and popsicles.     The following are not allowed on a clear liquid diet: red liquids, alcoholic beverages,  dairy products (milk, creamer, and yogurt), protein shakes,  juice with pulp and chewing tobacco.    At 4pm: drink 1 (one) 8 oz glass of NuLYTELY  solution every 15 minutes (approximately 8 glasses of 8 oz or half the bottle). Keep the solution refrigerated. Do not drink any other liquids while you are drinking the NuLYTELY  solution.    Over the course of the evening, drink an additional   liter of clear liquids and continue clear liquid diet.    Day of procedure:    6 hours before the procedure: drink 1 (one) 8 oz glass of NuLYTELY  solution every 15 minutes until the last half of the bottle (approximately 8 glasses of 8 oz) is gone. Keep the solution refrigerated. Do not drink any other liquids while you are drinking the NuLYTELY  solution. After that, you may continue the clear liquid diet until two hours before the procedure.      You may take all of your morning medications including blood  pressure medications, blood thinners (if you have not been instructed to stop these by our office), methadone, and anti-seizure medications with sips of water 2 hours prior to your procedure or earlier. Do not take insulin or vitamins prior to your procedure.       3 hours prior:   o STOP consuming all liquids   o Do not take anything by mouth during this time.   o Allow extra time to travel to your procedure as you may need to stop and use a restroom along the way.  You are ready for the procedure, if you followed all instructions and your stool is no longer formed, but clear or yellow liquid. If you are unsure whether your colon is clean, please call our office at [phone] before you leave for your appointment.    COLON CLEANSING TIPS: drink adequate amounts of fluids before and after your colon cleansing to prevent dehydration. Stay near a toilet because you will have diarrhea. Even if you are sitting on the toilet, continue to drink the cleansing solution every 15 minutes. If you feel nauseous or vomit, rinse your mouth with water, take a 15 to 30-minute-break and then continue drinking the solution. You will be uncomfortable until the stool has flushed from your colon (in about 2 to 4 hours). You may feel chilled.    Bring the following to your procedure:  - Insurance Card/Photo ID.   - List of current medications including over-the-counter medications and supplements.   - Your rescue inhaler if you currently use one to control asthma.    Canceling or rescheduling your appointment:   If you must cancel or reschedule your appointment, please call [phone] as soon as possible.      What happens during a colonoscopy?    Plan to spend up to two hours, starting at registration time, at the endoscopy center the day of your procedure. The colonoscopy takes an average of 15 to 30 minutes. Recovery time is about 30 minutes.    Before the exam:    You will change into a gown.    Your medical history and  medication list will be reviewed with you, unless that has been done over the phone prior to the procedure.     A nurse will insert an intravenous (IV) line into your hand or arm.    The doctor will meet with you and will give you a consent form to sign.  1.   2. During the exam:     Medicine will be given through the IV line to help you relax.     Your heart rate and oxygen levels will be monitored. If your blood pressure is low, you may be given fluids through the IV line.     The doctor will insert a flexible hollow tube, called a colonoscope, into your rectum. The scope will be advanced slowly through the large intestine (colon).    You may have a feeling of fullness or pressure.     If an abnormal tissue or a polyp is found, the doctor may remove it through the endoscope for closer examination, or biopsy. Tissue removal is painless.    After the exam:           Any tissue samples removed during the exam will be sent to a lab for evaluation. It may take 5-7 working days for you to be notified of the results.     A nurse will provide you with complete discharge instructions before you leave the endoscopy center. Be sure to ask the nurse for specific instructions if you take blood thinners such as Aspirin, Coumadin or Plavix.     The doctor will prepare a full report for you and for the physician who referred you for the procedure.     Your doctor will talk with you about the initial results of your exam.      Medication given during the exam will prohibit you from driving for the rest of the day.     Following the exam, you may resume your normal diet. Your first meal should be light, no greasy foods. Avoid alcohol until the next day.     You may resume your regular activities the day after the procedure.     LOW-FIBER DIET  Foods RECOMMENDED Foods to AVOID   Breads, Cereal, Rice and Pasta:   White bread, rolls, biscuits, croissant and bossman toast.   Waffles, Turkmen toast and pancakes.   White rice, noodles,  pasta, macaroni and peeled cooked potatoes.   Plain crackers and saltines.   Cooked cereals: farina, cream of rice.   Cold cereals: Puffed Rice , Rice Krispies , Corn Flakes  and Special K    Breads, Cereal, Rice and Pasta:   Breads or rolls with nuts, seeds or fruit.   Whole wheat, pumpernickel, rye breads and cornbread.   Potatoes with skin, brown or wild rice, and kasha (buckwheat).     Vegetables:   Tender cooked and canned vegetables without seeds: carrots, asparagus tips, green or wax beans, pumpkin, spinach, lima beans. Vegetables:   Raw or steamed vegetables.   Vegetables with seeds.   Sauerkraut.   Winter squash, peas, broccoli, Brussel sprouts, cabbage, onions, cauliflower, baked beans, peas and corn.   Fruits:   Strained fruit juice.   Canned fruit, except pineapple.   Ripe bananas and melon. Fruits:   Prunes and prune juice.   Raw fruits.   Dried fruits: figs, dates and raisins.   Milk/Dairy:   Milk: plain or flavored.   Yogurt, custard and ice cream.   Cheese and cottage cheese Milk/Dairy:     Meat and other proteins:   ground, well-cooked tender beef, lamb, ham, veal, pork, fish, poultry and organ meats.   Eggs.   Peanut butter without nuts. Meat and other proteins:   Tough, fibrous meats with gristle.   Dry beans, peas and lentils.   Peanut butter with nuts.   Tofu.   Fats, Snack, Sweets, Condiments and Beverages:   Margarine, butter, oils, mayonnaise, sour cream and salad dressing, plain gravy.   Sugar, hard candy, clear jelly, honey and syrup.   Spices, cooked herbs, bouillon, broth and soups made with allowed vegetable, ketchup and mustard.   Coffee, tea and carbonated drinks.   Plain cakes, cookies and pretzels.   Gelatin, plain puddings, custard, ice cream, sherbet and popsicles. Fats, Snack, Sweets, Condiments and Beverages:   Nuts, seeds and coconut.   Jam, marmalade and preserves.   Pickles, olives, relish and horseradish.   All desserts containing nuts, seeds, dried fruit and coconut; or made  from whole grains or bran.   Candy made with nuts or seeds.   Popcorn.       DIRECTIONS TO THE ENDOSCOPY DEPARTMENT    From the north ([place], [facility])  Take [address] South, exit on [address] . Get into the left hand romi, turn left (east), go one-half mile to [address] and turn left. Go north to the first stoplight, take a right on [address] and follow it to the Emergency entrance.    From the south ([facility])  Take [address]N to the [address]E split and exit on [address] . On [address] , turn left (west) to [address]. Turn right (north) on [address]. Go north to the first stoplight, take a right on [address] and follow it to the Emergency entrance.    From the east via 35E ([facility])  Take [address] south to [address]  exit. Turn right on [address]. Go west to [address]. Turn right (north) on [address]. Go to the first stoplight, take a right and follow on [address] to the Emergency entrance.    From the east via [address] ([facility])  Take [address] to [address]. Turn left (south) on [address] to [address]. Turn left (east) on [address] and follow it to the Emergency entrance.    From the west via [address] ([facility]  Take [address] east to [address]. Turn right (south) on [address] to [address]. Turn left (east) on [address] and follow it to the Emergency entrance.

## 2019-08-07 NOTE — PROCEDURES
PRE-PROCEDURE H&P    CHIEF COMPLAINT / REASON FOR PROCEDURE:  screening    PERTINENT HISTORY :    Past Medical History:   Diagnosis Date     Esophageal reflux 9/16/2009     Gilbert's disease      Gout 9/16/2009     HTN (hypertension) 9/16/2009     Hyperlipidemia LDL goal <130 9/16/2009      Past Surgical History:   Procedure Laterality Date     BIOPSY PROSTATE TRANSRECTAL  2017    bx showed BPH, negative for prostate cancer     DAVINCI PROSTATECTOMY  02/2018     LASIK  2006     repair right hydronephrosis  Age 14         Bleeding tendencies:  No    Relevant Family History:  NONE     Relevant Social History:  NONE      A relevant review of systems was performed and was negative      ALLERGIES/SENSITIVITIES: No Known Allergies    CURRENT MEDICATIONS:   No current outpatient medications on file.        PRE-SEDATION ASSESSMENT:    Lung Exam:  normal  Heart Exam:  normal  Airway Exam: normal  Previous reaction to anesthesia/sedation:   No  Sedation plan based on assessment: Moderate (conscious) sedation  ASA Classification:  2 - Mild systemic disease      IMPRESSION:  screening    PLAN:  colonoscopy     Susi De La Cruz  [facility]  Office: [phone]

## 2019-08-07 NOTE — TELEPHONE ENCOUNTER
Pending Prescriptions:                       Disp   Refills    losartan (COZAAR) 25 MG tablet            30 tab*1            Sig: Take 1 tablet (25 mg) by mouth daily    Routing refill request to provider for review/approval because:  Labs out of range:  Creatinine    Lisa Braswell RN, BSN

## 2019-08-08 LAB — COPATH REPORT: NORMAL

## 2019-08-08 RX ORDER — LOSARTAN POTASSIUM 25 MG/1
25 TABLET ORAL DAILY
Qty: 30 TABLET | Refills: 0 | Status: SHIPPED | OUTPATIENT
Start: 2019-08-08 | End: 2019-08-27

## 2019-08-08 NOTE — TELEPHONE ENCOUNTER
Will refill but due for BMP recheck per last lab results note. Please have patient schedule this. Need lab before any future refills.    Viewed by Yrn Will on 7/17/2019 10:34 AM   Written by Enrico Reagan MD on 7/14/2019  1:34 PM   Dear Yrn,   Here are your recent results.     The follow-up blood chemistries are generally stable.  The kidney function test is slightly worse than normal and a bit higher than last check 1 month ago.  Please make sure you are drinking adequate fluids daily-I recommend we recheck this in the next month.  Please schedule a lab draw at your convenience in about a month       Enrico Hinton MD  Internal Medicine-Pediatrics

## 2019-08-14 NOTE — TELEPHONE ENCOUNTER
Central Prior Authorization Team   Phone: [phone]      PA Initiation    Medication: amLODIPine Besylate 10 mg tab  Insurance Company: [employer] [place] - Phone [phone] Fax [phone]  Pharm[facility] Filling the Rx: [facility] [id number] [place] - [id number] AT [facility]  Filling Pharmacy Phone: [phone]  Filling Pharmacy Fax:    Start Date: 8/14/2019

## 2019-08-14 NOTE — TELEPHONE ENCOUNTER
Prior Authorization Not Needed per Insurance    Medication: amLODIPine Besylate 10 mg tab  Insurance Company: [employer] [place] - Phone [phone] Fax [phone]  Expected CoPay:      Pharmacy Filling the Rx: [facility] [id number] [place] - [id number] AT [facility]  Pharmacy Notified: Yes  Patient Notified: Yes

## 2019-08-15 DIAGNOSIS — N18.30 CKD (CHRONIC KIDNEY DISEASE) STAGE 3, GFR 30-59 ML/MIN (H): ICD-10-CM

## 2019-08-15 LAB
ANION GAP SERPL CALCULATED.3IONS-SCNC: 5 MMOL/L (ref 3–14)
BUN SERPL-MCNC: 13 MG/DL (ref 7–30)
CALCIUM SERPL-MCNC: 9.5 MG/DL (ref 8.5–10.1)
CHLORIDE SERPL-SCNC: 105 MMOL/L (ref 94–109)
CO2 SERPL-SCNC: 29 MMOL/L (ref 20–32)
CREAT SERPL-MCNC: 1.55 MG/DL (ref 0.66–1.25)
GFR SERPL CREATININE-BSD FRML MDRD: 47 ML/MIN/{1.73_M2}
GLUCOSE SERPL-MCNC: 114 MG/DL (ref 70–99)
POTASSIUM SERPL-SCNC: 4.3 MMOL/L (ref 3.4–5.3)
SODIUM SERPL-SCNC: 139 MMOL/L (ref 133–144)

## 2019-08-15 PROCEDURE — 36415 COLL VENOUS BLD VENIPUNCTURE: CPT | Performed by: INTERNAL MEDICINE

## 2019-08-15 PROCEDURE — 80048 BASIC METABOLIC PNL TOTAL CA: CPT | Performed by: INTERNAL MEDICINE

## 2019-08-26 ENCOUNTER — ALLIED HEALTH/NURSE VISIT (OUTPATIENT)
Dept: PEDIATRICS | Facility: CLINIC | Age: 59
End: 2019-08-26
Payer: COMMERCIAL

## 2019-08-26 VITALS — SYSTOLIC BLOOD PRESSURE: 138 MMHG | DIASTOLIC BLOOD PRESSURE: 80 MMHG

## 2019-08-26 DIAGNOSIS — Z01.30 BP CHECK: Primary | ICD-10-CM

## 2019-08-26 PROCEDURE — 99207 ZZC NO CHARGE NURSE ONLY: CPT | Performed by: INTERNAL MEDICINE

## 2019-08-26 NOTE — PROGRESS NOTES
Yrn MC Will was evaluated at [facility] on August 26, 2019 at which time his blood pressure was:    BP Readings from Last 3 Encounters:   08/26/19 138/80   08/07/19 (!) 127/90   07/08/19 131/89     Pulse Readings from Last 3 Encounters:   08/07/19 68   07/08/19 74   06/18/19 75       Reviewed lifestyle modifications for blood pressure control and reduction: including making healthy food choices, managing weight, getting regular exercise, smoking cessation, reducing alcohol consumption, monitoring blood pressure regularly.     Symptoms: None    BP Goal:< 140/90 mmHg    BP Assessment:  BP at goal    Potential Reasons for BP too high: NA - Not applicable    BP Follow-Up Plan: Recheck BP in 6 months at pharmacy    Recommendation to Provider: n/a    Note completed by: Vitor Bingham, PharmD  [facility]

## 2019-08-27 DIAGNOSIS — I10 ESSENTIAL HYPERTENSION WITH GOAL BLOOD PRESSURE LESS THAN 140/90: ICD-10-CM

## 2019-08-27 RX ORDER — LOSARTAN POTASSIUM 25 MG/1
25 TABLET ORAL DAILY
Qty: 90 TABLET | Refills: 3 | Status: SHIPPED | OUTPATIENT
Start: 2019-08-27 | End: 2020-05-15

## 2019-08-27 NOTE — TELEPHONE ENCOUNTER
Cozaar  Routing refill request to provider for review/approval because:  Labs out of range:  Creatinine    Yesica Branch, RN, BSN

## 2019-09-20 ENCOUNTER — OFFICE VISIT (OUTPATIENT)
Dept: URGENT CARE | Facility: URGENT CARE | Age: 59
End: 2019-09-20
Payer: COMMERCIAL

## 2019-09-20 VITALS
HEART RATE: 68 BPM | WEIGHT: 225 LBS | TEMPERATURE: 98 F | SYSTOLIC BLOOD PRESSURE: 132 MMHG | BODY MASS INDEX: 34.21 KG/M2 | RESPIRATION RATE: 18 BRPM | OXYGEN SATURATION: 98 % | DIASTOLIC BLOOD PRESSURE: 82 MMHG

## 2019-09-20 DIAGNOSIS — H66.002 ACUTE SUPPURATIVE OTITIS MEDIA OF LEFT EAR WITHOUT SPONTANEOUS RUPTURE OF TYMPANIC MEMBRANE, RECURRENCE NOT SPECIFIED: ICD-10-CM

## 2019-09-20 DIAGNOSIS — H60.392 OTHER INFECTIVE ACUTE OTITIS EXTERNA OF LEFT EAR: Primary | ICD-10-CM

## 2019-09-20 PROCEDURE — 99213 OFFICE O/P EST LOW 20 MIN: CPT | Performed by: PHYSICIAN ASSISTANT

## 2019-09-20 RX ORDER — AMOXICILLIN 500 MG/1
500 CAPSULE ORAL 3 TIMES DAILY
Qty: 30 CAPSULE | Refills: 0 | Status: SHIPPED | OUTPATIENT
Start: 2019-09-20 | End: 2019-09-30

## 2019-09-20 RX ORDER — OFLOXACIN 3 MG/ML
5 SOLUTION AURICULAR (OTIC) DAILY
Qty: 10 ML | Refills: 0 | Status: SHIPPED | OUTPATIENT
Start: 2019-09-20 | End: 2019-09-27

## 2019-09-20 NOTE — PROGRESS NOTES
After Visit Summary   11/3/2017    Anastasiya Mota    MRN: [id number]           Patient Information     Date Of Birth          5/1/1931        Visit Information        Provider Department      11/3/2017 2:30 PM Linda Holbrook PA-C [facility]        Today's Diagnoses     Benign essential hypertension    -  1    Lower extremity edema        Atrial flutter, unspecified type (H)        SVT (supraventricular tachycardia) (H)          Care Instructions    1.  Decrease the lisinopril to 20 mg due to the creatinine of 1.57.  With the BPs being elevated in the AM at [facility], will change the timing of the lisinopril dosing to the evening.   2.  The BP here is well controlled at 112/60 and 110/60.   3.  BMP in 1 week. Please fax results to heart clinic along with recent BPs. [phone].                 Follow-ups after your visit        Your next 10 appointments already scheduled     Dec 15, 2017  2:15 PM CST   Return Visit with Vinod Barajas MD   [facility] ([facility])    [address] [place] [id number] [phone]              Future tests that were ordered for you today     Open Future Orders        Priority Expected Expires Ordered    Basic metabolic panel Routine 11/9/2017 11/3/2018 11/3/2017            Who to contact     If you have questions or need follow up information about today's clinic visit or your schedule please contact [facility] directly at [phone].  Normal or non-critical lab and imaging results will be communicated to you by MyChart, letter or phone within 4 business days after the clinic has received the results. If you do not hear from us within 7 days, please contact the clinic through MyChart or phone. If you have a critical or abnormal lab result, we will notify you by phone as soon as possible.  Submit  SUBJECTIVE:  Yrn Will is a 59 year old male who presents with left ear fullness and hearing loss for 2 week(s). Worsening in the last several days  Severity: moderate   Timing:sudden onset, gradual onset and still present  Additional symptoms include Patient has had some sinus symptoms. .    He has used debrox to help with symptoms.   History of recurrent otitis: no    Past Medical History:   Diagnosis Date     Esophageal reflux 9/16/2009     Gilbert's disease      Gout 9/16/2009     HTN (hypertension) 9/16/2009     Hyperlipidemia LDL goal <130 9/16/2009     Current Outpatient Medications   Medication Sig Dispense Refill     amLODIPine (NORVASC) 10 MG tablet Take 1 tablet (10 mg) by mouth daily 90 tablet 3     amoxicillin (AMOXIL) 500 MG capsule Take 1 capsule (500 mg) by mouth 3 times daily for 10 days 30 capsule 0     losartan (COZAAR) 25 MG tablet Take 1 tablet (25 mg) by mouth daily 90 tablet 3     ofloxacin (FLOXIN) 0.3 % otic solution Place 5 drops Into the left ear daily for 7 days 10 mL 0     omeprazole (PRILOSEC) 20 MG DR capsule Take 1 capsule (20 mg) by mouth daily as needed 90 capsule 3     simvastatin (ZOCOR) 40 MG tablet Take 1 tablet (40 mg) by mouth At Bedtime 90 tablet 3     Sildenafil Citrate (REVATIO PO)        Social History     Tobacco Use     Smoking status: Never Smoker     Smokeless tobacco: Never Used   Substance Use Topics     Alcohol use: No     Alcohol/week: 0.0 oz       ROS:   Review of systems negative except as stated above.    OBJECTIVE:  /82   Pulse 68   Temp 98  F (36.7  C)   Resp 18   Wt 102.1 kg (225 lb)   SpO2 98%   BMI 34.21 kg/m     EXAM:  The right TM is normal: no effusions, no erythema, and normal landmarks     The right auditory canal is normal and without drainage, edema or erythema  The left TM is bulging and erythematous  The left auditory canal is erythematous, swollen and tender  Oropharynx exam is normal: no lesions, erythema, adenopathy or  "refill requests through [employer] or call your pharmacy and they will forward the refill request to us. Please allow 3 business days for your refill to be completed.          Additional Information About Your Visit        [employer] Information     [employer] lets you send messages to your doctor, view your test results, renew your prescriptions, schedule appointments and more. To sign up, go to [email or web address] . Click on \"Log in\" on the left side of the screen, which will take you to the Welcome page. Then click on \"Sign up Now\" on the right side of the page.     You will be asked to enter the access code listed below, as well as some personal information. Please follow the directions to create your username and password.     Your access code is: [email or web address]  Expires: 2018  4:06 PM     Your access code will  in 90 days. If you need help or a new code, please call your [facility] clinic or [phone].        Care EveryWhere ID     This is your Care EveryWhere ID. This could be used by other organizations to access your [facility] medical records  [id number]        Your Vitals Were     Pulse Height Pulse Oximetry BMI (Body Mass Index)          58 1.626 m (5' 4\") 99% 40.85 kg/m2         Blood Pressure from Last 3 Encounters:   17 110/60   10/19/17 132/58   17 140/66    Weight from Last 3 Encounters:   17 108 kg (238 lb)   10/19/17 112 kg (247 lb)   17 114.4 kg (252 lb 1.6 oz)              We Performed the Following     Follow-Up with Cardiac Advanced Practice Provider          Today's Medication Changes          These changes are accurate as of: 11/3/17  3:15 PM.  If you have any questions, ask your nurse or doctor.               Start taking these medicines.        Dose/Directions    lisinopril 20 MG tablet   Commonly known as:  PRINIVIL/ZESTRIL   Used for:  Benign essential hypertension   Started by:  Linda Holbrook PA-C        Dose:  20 mg   Take 1 tablet (20 mg) by mouth every " exudate.  GENERAL: no acute distress  EYES: EOMI,  PERRL, conjunctiva clear  NECK: supple, non-tender to palpation, no adenopathy noted  RESP: lungs clear to auscultation - no rales, rhonchi or wheezes  CV: regular rates and rhythm, normal S1 S2, no murmur noted  SKIN: no suspicious lesions or rashes     ASSESSMENT / PLAN:  1. Infective acute otitis externa of left ear  - ofloxacin (FLOXIN) 0.3 % otic solution; Place 5 drops Into the left ear daily for 7 days  Dispense: 10 mL; Refill: 0    2. Acute suppurative otitis media of left ear without spontaneous rupture of tympanic membrane, recurrence not specified  - amoxicillin (AMOXIL) 500 MG capsule; Take 1 capsule (500 mg) by mouth 3 times daily for 10 days  Dispense: 30 capsule; Refill: 0    Ears were flushed in clinic  Canal has swelling and he has TTP with manipulation of ear lobe. He also has erythematous and bulging membrane. Will treat for both today.    Diagnosis and treatment plan was reviewed with patient and/or family.   We went over any labs or imaging. Discussed worsening symptoms or little to no relief despite treatment plan to follow-up with PCP or return to clinic.  Patient verbalizes understanding. All questions were addressed and answered.   Nelli Bennett PA-C       evening   Quantity:  30 tablet   Refills:  3         These medicines have changed or have updated prescriptions.        Dose/Directions    diltiazem 240 MG 24 hr capsule   This may have changed:    - medication strength  - how much to take   Used for:  Atrial flutter, unspecified type (H), SVT (supraventricular tachycardia) (H)   Changed by:  Linda Holbrook PA-C        Dose:  240 mg   Take 1 capsule (240 mg) by mouth daily   Refills:  0            Where to get your medicines      Some of these will need a paper prescription and others can be bought over the counter.  Ask your nurse if you have questions.     Bring a paper prescription for each of these medications     lisinopril 20 MG tablet                Primary Care Provider Office Phone # Fax #    [facility] [phone] [id number] [facility] [address]        Equal Access to Services     LARA OLIVEIRA : Kanchan breeno Sodevan, waaxda luqadaha, qaybta kaalmada marlen, jeremias lozano. So [facility] [phone].    ATENCIÓN: Si habla español, tiene a vasquez disposición servicios gratuitos de asistencia lingüística. Girishame al [phone].    We comply with applicable federal civil rights laws and [place] laws. We do not discriminate on the basis of race, color, national origin, age, disability, sex, sexual orientation, or gender identity.            Thank you!     Thank you for choosing [facility]  for your care. Our goal is always to provide you with excellent care. Hearing back from our patients is one way we can continue to improve our services. Please take a few minutes to complete the written survey that you may receive in the mail after your visit with us. Thank you!             Your Updated Medication List - Protect others around you: Learn how to safely use, store and throw away your medicines at www.disposemymeds.org.          This  list is accurate as of: 11/3/17  3:15 PM.  Always use your most recent med list.                   Brand Name Dispense Instructions for use Diagnosis    ACETAMINOPHEN PO      Take 1,000 mg by mouth 3 times daily as needed        ANASTROZOLE PO      Take 1 mg by mouth daily        aspirin 81 MG EC tablet     90 tablet    Take 1 tablet (81 mg) by mouth daily    CVA (cerebral vascular accident) (H)       atorvastatin 40 MG tablet    LIPITOR     Take 40 mg by mouth every evening        carvedilol 12.5 MG tablet    COREG    60 tablet    Take 25 mg by mouth 2 times daily (with meals)    Atrial flutter, unspecified type (H), SVT (supraventricular tachycardia) (H)       cloNIDine 0.1 MG tablet    CATAPRES     Take 0.1 mg by mouth 2 times daily as needed        diltiazem 240 MG 24 hr capsule      Take 1 capsule (240 mg) by mouth daily    Atrial flutter, unspecified type (H), SVT (supraventricular tachycardia) (H)       ferrous sulfate 325 (65 FE) MG tablet    IRON     Take 325 mg by mouth daily (with breakfast)        HYDROCHLOROTHIAZIDE PO      Take 25 mg by mouth daily        LANSOPRAZOLE PO      Take 30 mg by mouth every morning (before breakfast)        lisinopril 20 MG tablet    PRINIVIL/ZESTRIL    30 tablet    Take 1 tablet (20 mg) by mouth every evening    Benign essential hypertension       melatonin 3 MG tablet      Take 3 mg by mouth nightly as needed for sleep        SENEXON-S 8.6-50 MG per tablet   Generic drug:  senna-docusate      Take 2 tablets by mouth 2 times daily        spironolactone 25 MG tablet    ALDACTONE    30 tablet    Take 0.5 tablets (12.5 mg) by mouth daily    Lower extremity edema       ZOFRAN PO      Take 4 mg by mouth every 8 hours as needed for nausea or vomiting        ZOLOFT PO      Take 50 mg by mouth daily

## 2019-10-02 ENCOUNTER — HEALTH MAINTENANCE LETTER (OUTPATIENT)
Age: 59
End: 2019-10-02

## 2019-10-16 ENCOUNTER — ALLIED HEALTH/NURSE VISIT (OUTPATIENT)
Dept: NURSING | Facility: CLINIC | Age: 59
End: 2019-10-16
Payer: COMMERCIAL

## 2019-10-16 VITALS — SYSTOLIC BLOOD PRESSURE: 126 MMHG | HEART RATE: 81 BPM | DIASTOLIC BLOOD PRESSURE: 81 MMHG

## 2019-10-16 DIAGNOSIS — Z23 NEED FOR SHINGLES VACCINE: ICD-10-CM

## 2019-10-16 DIAGNOSIS — Z23 NEED FOR PROPHYLACTIC VACCINATION AND INOCULATION AGAINST INFLUENZA: Primary | ICD-10-CM

## 2019-10-16 PROCEDURE — 90471 IMMUNIZATION ADMIN: CPT

## 2019-10-16 PROCEDURE — 90732 PPSV23 VACC 2 YRS+ SUBQ/IM: CPT

## 2019-10-16 PROCEDURE — 90750 HZV VACC RECOMBINANT IM: CPT

## 2019-10-16 PROCEDURE — 90472 IMMUNIZATION ADMIN EACH ADD: CPT

## 2019-10-16 PROCEDURE — 90682 RIV4 VACC RECOMBINANT DNA IM: CPT

## 2019-10-16 NOTE — PROGRESS NOTES
Prior to immunization administration, verified patients identity using patient s name and date of birth. Please see Immunization Activity for additional information.     Screening Questionnaire for Adult Immunization    Are you sick today?   No   Do you have allergies to medications, food, a vaccine component or latex?   No   Have you ever had a serious reaction after receiving a vaccination?   No   Do you have a long-term health problem with heart disease, lung disease, asthma, kidney disease, metabolic disease (e.g. diabetes), anemia, or other blood disorder?   No   Do you have cancer, leukemia, HIV/AIDS, or any other immune system problem?   No   In the past 3 months, have you taken medications that affect  your immune system, such as prednisone, other steroids, or anticancer drugs; drugs for the treatment of rheumatoid arthritis, Crohn s disease, or psoriasis; or have you had radiation treatments?   No   Have you had a seizure, or a brain or other nervous system problem?   No   During the past year, have you received a transfusion of blood or blood     products, or been given immune (gamma) globulin or antiviral drug?   No   For women: Are you pregnant or is there a chance you could become        pregnant during the next month?   No   Have you received any vaccinations in the past 4 weeks?   No     Immunization questionnaire answers were all negative.           Screening performed by Nilda Dolan CMA on 10/16/2019 at 8:48 AM.      Per . she said it is okay to give pneumovax 23 according to his health history.

## 2019-10-30 ENCOUNTER — HEALTH MAINTENANCE LETTER (OUTPATIENT)
Age: 59
End: 2019-10-30

## 2020-05-14 DIAGNOSIS — I10 ESSENTIAL HYPERTENSION WITH GOAL BLOOD PRESSURE LESS THAN 140/90: ICD-10-CM

## 2020-05-14 DIAGNOSIS — K21.9 GASTROESOPHAGEAL REFLUX DISEASE WITHOUT ESOPHAGITIS: ICD-10-CM

## 2020-05-14 DIAGNOSIS — E78.5 HYPERLIPIDEMIA LDL GOAL <130: ICD-10-CM

## 2020-05-15 RX ORDER — SIMVASTATIN 40 MG
TABLET ORAL
Qty: 30 TABLET | Refills: 0 | Status: SHIPPED | OUTPATIENT
Start: 2020-05-15 | End: 2020-05-27

## 2020-05-15 RX ORDER — LOSARTAN POTASSIUM 25 MG/1
TABLET ORAL
Qty: 30 TABLET | Refills: 0 | Status: SHIPPED | OUTPATIENT
Start: 2020-05-15 | End: 2020-05-27

## 2020-05-15 RX ORDER — AMLODIPINE BESYLATE 10 MG/1
TABLET ORAL
Qty: 30 TABLET | Refills: 0 | Status: SHIPPED | OUTPATIENT
Start: 2020-05-15 | End: 2020-05-27

## 2020-05-15 NOTE — TELEPHONE ENCOUNTER
Pt scheduled for a phone visit on 5/27.  Pt is hesitant to come in to the clinic.  Video visit offered, pt prefers telephone visit.    Aarti Vizcarra      11:52 AM May 15, 2020

## 2020-05-15 NOTE — TELEPHONE ENCOUNTER
Please call pt to schedule appt for June  Medication is being filled for 1 time refill only due to:  Patient needs to be seen because needs appt June for omeprazole     Routing refill request to provider for review/approval because:  Labs out of range:  CR for amlodipine and losartan and drug interaction     Johnathan Mendez RN, BSN

## 2020-05-27 ENCOUNTER — VIRTUAL VISIT (OUTPATIENT)
Dept: PEDIATRICS | Facility: CLINIC | Age: 60
End: 2020-05-27
Payer: COMMERCIAL

## 2020-05-27 DIAGNOSIS — Z23 NEED FOR SHINGLES VACCINE: ICD-10-CM

## 2020-05-27 DIAGNOSIS — E78.5 HYPERLIPIDEMIA LDL GOAL <130: ICD-10-CM

## 2020-05-27 DIAGNOSIS — I10 ESSENTIAL HYPERTENSION WITH GOAL BLOOD PRESSURE LESS THAN 140/90: Primary | ICD-10-CM

## 2020-05-27 DIAGNOSIS — Z12.5 SCREENING FOR PROSTATE CANCER: ICD-10-CM

## 2020-05-27 DIAGNOSIS — R73.03 PREDIABETES: ICD-10-CM

## 2020-05-27 DIAGNOSIS — Z23 NEED FOR DIPHTHERIA-TETANUS-PERTUSSIS (TDAP) VACCINE: ICD-10-CM

## 2020-05-27 DIAGNOSIS — K21.9 GASTROESOPHAGEAL REFLUX DISEASE WITHOUT ESOPHAGITIS: ICD-10-CM

## 2020-05-27 PROCEDURE — 99214 OFFICE O/P EST MOD 30 MIN: CPT | Mod: 95 | Performed by: INTERNAL MEDICINE

## 2020-05-27 RX ORDER — LOSARTAN POTASSIUM 25 MG/1
25 TABLET ORAL DAILY
Qty: 90 TABLET | Refills: 3 | Status: SHIPPED | OUTPATIENT
Start: 2020-05-27 | End: 2021-02-11

## 2020-05-27 RX ORDER — AMLODIPINE BESYLATE 10 MG/1
10 TABLET ORAL DAILY
Qty: 90 TABLET | Refills: 3 | Status: SHIPPED | OUTPATIENT
Start: 2020-05-27 | End: 2021-08-16

## 2020-05-27 RX ORDER — FAMOTIDINE 20 MG/1
20-40 TABLET, FILM COATED ORAL DAILY
Qty: 120 TABLET | Refills: 1 | Status: SHIPPED | OUTPATIENT
Start: 2020-05-27 | End: 2020-10-16

## 2020-05-27 RX ORDER — SIMVASTATIN 40 MG
TABLET ORAL
Qty: 90 TABLET | Refills: 3 | Status: SHIPPED | OUTPATIENT
Start: 2020-05-27 | End: 2021-08-16

## 2020-05-27 NOTE — PROGRESS NOTES
"Yrn Will is a 60 year old male who is being evaluated via a billable telephone visit.      The patient has been notified of following:     \"This telephone visit will be conducted via a call between you and your physician/provider. We have found that certain health care needs can be provided without the need for a physical exam.  This service lets us provide the care you need with a short phone conversation.  If a prescription is necessary we can send it directly to your pharmacy.  If lab work is needed we can place an order for that and you can then stop by our lab to have the test done at a later time.    Telephone visits are billed at different rates depending on your insurance coverage. During this emergency period, for some insurers they may be billed the same as an in-person visit.  Please reach out to your insurance provider with any questions.    If during the course of the call the physician/provider feels a telephone visit is not appropriate, you will not be charged for this service.\"    Patient has given verbal consent for Telephone visit?  Yes    What phone number would you like to be contacted at? [phone]    How would you like to obtain your AVS? Johanna Solomon     Yrn Will is a 60 year old male who presents via phone visit today for the following health issues:    Hyperlipidemia Follow-Up      Are you regularly taking any medication or supplement to lower your cholesterol?   Yes- statin    Are you having muscle aches or other side effects that you think could be caused by your cholesterol lowering medication?  No    Hypertension Follow-up      Do you check your blood pressure regularly outside of the clinic? Yes     Are you following a low salt diet? Yes    Are your blood pressures ever more than 140 on the top number (systolic) OR more   than 90 on the bottom number (diastolic), for example 140/90? No     Patient Active Problem List   Diagnosis     Hyperlipidemia LDL goal <130 "     Hepatic steatosis     Gilbert's disease     Prediabetes     Advanced directives, counseling/discussion     CKD (chronic kidney disease) stage 3, GFR 30-59 ml/min (H)     Essential hypertension with goal blood pressure less than 140/90     Gout, unspecified cause, unspecified chronicity, unspecified site     Gastroesophageal reflux disease without esophagitis     Obesity, unspecified obesity severity, unspecified obesity type     Prostate cancer (H)     Stress incontinence of urine     Calculus of gallbladder without cholecystitis     Past Surgical History:   Procedure Laterality Date     BIOPSY PROSTATE TRANSRECTAL  2017    bx showed BPH, negative for prostate cancer     DAVINCI PROSTATECTOMY  02/2018     LASIK  2006     repair right hydronephrosis  Age 14       Social History     Tobacco Use     Smoking status: Never Smoker     Smokeless tobacco: Never Used   Substance Use Topics     Alcohol use: No     Alcohol/week: 0.0 standard drinks     Family History   Problem Relation Age of Onset     C.A.D. Brother         stent, age 56     C.A.D. Maternal Uncle         MI in 50's     Hypertension Father      Hypertension Mother      Cancer - colorectal No family hx of      Prostate Cancer No family hx of          Current Outpatient Medications   Medication Sig Dispense Refill     amLODIPine (NORVASC) 10 MG tablet Take 1 tablet (10 mg) by mouth daily 90 tablet 3     famotidine (PEPCID) 20 MG tablet Take 1-2 tablets (20-40 mg) by mouth daily 120 tablet 1     losartan (COZAAR) 25 MG tablet Take 1 tablet (25 mg) by mouth daily 90 tablet 3     omeprazole (PRILOSEC) 20 MG DR capsule TAKE ONE CAPSULE BY MOUTH EVERY DAY AS NEEDED 30 capsule 0     simvastatin (ZOCOR) 40 MG tablet TAKE ONE TABLET BY MOUTH EVERY DAY AT BEDTIME 90 tablet 3     Sildenafil Citrate (REVATIO PO)          Reviewed and updated as needed this visit by Provider         Review of Systems   Constitutional, HEENT, cardiovascular, pulmonary, gi and gu systems  are negative, except as otherwise noted.       Objective   Reported vitals:  There were no vitals taken for this visit.       Assessment/Plan:  1. Essential hypertension with goal blood pressure less than 140/90     Adequate control, continue current rx.  Pt encouraged to check home BP periodically  - amLODIPine (NORVASC) 10 MG tablet; Take 1 tablet (10 mg) by mouth daily  Dispense: 90 tablet; Refill: 3  - losartan (COZAAR) 25 MG tablet; Take 1 tablet (25 mg) by mouth daily  Dispense: 90 tablet; Refill: 3    2. Hyperlipidemia LDL goal <130  Lab Results   Component Value Date     06/10/2019           Continue current rx, RTC later this year for fasting labwork   - simvastatin (ZOCOR) 40 MG tablet; TAKE ONE TABLET BY MOUTH EVERY DAY AT BEDTIME  Dispense: 90 tablet; Refill: 3  - Comprehensive metabolic panel; Future  - Lipid panel reflex to direct LDL Fasting; Future    3. Prediabetes  - Hemoglobin A1c; Future    4. Gastroesophageal reflux disease without esophagitis       rec stopping omeprazole (hx of CKD), famotidine substituted  - famotidine (PEPCID) 20 MG tablet; Take 1-2 tablets (20-40 mg) by mouth daily  Dispense: 120 tablet; Refill: 1    5. Screening for prostate cancer  - Prostate spec antigen screen; Future    Pt will schedule nurse visit for immunizations  6. Need for diphtheria-tetanus-pertussis (Tdap) vaccine  - TDAP, IM (10 - 64 YRS) - Adacel; Future  7. Need for shingles vaccine  - ZOSTER VACC LIVE SUBQ NJX; Future    Phone call duration:  11 minutes    Enrico Reagan MD, MD

## 2020-05-27 NOTE — PATIENT INSTRUCTIONS
Yrn thank you for your time today.  Here's a summary of our virtual visit and the plan we discussed.   Please let us know if you have any additional questions:    Stop omeprazole and start famotidine 20-40mg daily    We will contact you to schedule fasting lab draw and nurse visit for immunizations    Have a good day!  Dr Reagan

## 2020-06-09 ENCOUNTER — ALLIED HEALTH/NURSE VISIT (OUTPATIENT)
Dept: NURSING | Facility: CLINIC | Age: 60
End: 2020-06-09
Payer: COMMERCIAL

## 2020-06-09 DIAGNOSIS — Z23 NEED FOR VACCINATION: Primary | ICD-10-CM

## 2020-06-09 DIAGNOSIS — E78.5 HYPERLIPIDEMIA LDL GOAL <130: ICD-10-CM

## 2020-06-09 DIAGNOSIS — Z12.5 SCREENING FOR PROSTATE CANCER: ICD-10-CM

## 2020-06-09 DIAGNOSIS — R73.03 PREDIABETES: ICD-10-CM

## 2020-06-09 LAB
ALBUMIN SERPL-MCNC: 3.7 G/DL (ref 3.4–5)
ALP SERPL-CCNC: 126 U/L (ref 40–150)
ALT SERPL W P-5'-P-CCNC: 56 U/L (ref 0–70)
ANION GAP SERPL CALCULATED.3IONS-SCNC: 7 MMOL/L (ref 3–14)
AST SERPL W P-5'-P-CCNC: 37 U/L (ref 0–45)
BILIRUB SERPL-MCNC: 1.4 MG/DL (ref 0.2–1.3)
BUN SERPL-MCNC: 14 MG/DL (ref 7–30)
CALCIUM SERPL-MCNC: 8.4 MG/DL (ref 8.5–10.1)
CHLORIDE SERPL-SCNC: 109 MMOL/L (ref 94–109)
CHOLEST SERPL-MCNC: 161 MG/DL
CO2 SERPL-SCNC: 22 MMOL/L (ref 20–32)
CREAT SERPL-MCNC: 1.31 MG/DL (ref 0.66–1.25)
GFR SERPL CREATININE-BSD FRML MDRD: 58 ML/MIN/{1.73_M2}
GLUCOSE SERPL-MCNC: 109 MG/DL (ref 70–99)
HBA1C MFR BLD: 5.8 % (ref 0–5.6)
HDLC SERPL-MCNC: 42 MG/DL
LDLC SERPL CALC-MCNC: 92 MG/DL
NONHDLC SERPL-MCNC: 119 MG/DL
POTASSIUM SERPL-SCNC: 3.6 MMOL/L (ref 3.4–5.3)
PROT SERPL-MCNC: 7.6 G/DL (ref 6.8–8.8)
PSA SERPL-ACNC: <0.01 UG/L (ref 0–4)
SODIUM SERPL-SCNC: 138 MMOL/L (ref 133–144)
TRIGL SERPL-MCNC: 136 MG/DL

## 2020-06-09 PROCEDURE — 80061 LIPID PANEL: CPT | Performed by: INTERNAL MEDICINE

## 2020-06-09 PROCEDURE — 80053 COMPREHEN METABOLIC PANEL: CPT | Performed by: INTERNAL MEDICINE

## 2020-06-09 PROCEDURE — 90471 IMMUNIZATION ADMIN: CPT

## 2020-06-09 PROCEDURE — 36415 COLL VENOUS BLD VENIPUNCTURE: CPT | Performed by: INTERNAL MEDICINE

## 2020-06-09 PROCEDURE — 90715 TDAP VACCINE 7 YRS/> IM: CPT

## 2020-06-09 PROCEDURE — 83036 HEMOGLOBIN GLYCOSYLATED A1C: CPT | Performed by: INTERNAL MEDICINE

## 2020-06-09 PROCEDURE — G0103 PSA SCREENING: HCPCS | Performed by: INTERNAL MEDICINE

## 2020-06-09 NOTE — PROGRESS NOTES
Prior to immunization administration, verified patients identity using patient s name and date of birth. Please see Immunization Activity for additional information.     Screening Questionnaire for Adult Immunization    Are you sick today?   No   Do you have allergies to medications, food, a vaccine component or latex?   No   Have you ever had a serious reaction after receiving a vaccination?   No   Do you have a long-term health problem with heart, lung, kidney, or metabolic disease (e.g., diabetes), asthma, a blood disorder, no spleen, complement component deficiency, a cochlear implant, or a spinal fluid leak?  Are you on long-term aspirin therapy?   No   Do you have cancer, leukemia, HIV/AIDS, or any other immune system problem?   No   Do you have a parent, brother, or sister with an immune system problem?   No   In the past 3 months, have you taken medications that affect  your immune system, such as prednisone, other steroids, or anticancer drugs; drugs for the treatment of rheumatoid arthritis, Crohn s disease, or psoriasis; or have you had radiation treatments?   No   Have you had a seizure, or a brain or other nervous system problem?   No   During the past year, have you received a transfusion of blood or blood    products, or been given immune (gamma) globulin or antiviral drug?   No   For women: Are you pregnant or is there a chance you could become       pregnant during the next month?   No   Have you received any vaccinations in the past 4 weeks?   No     Immunization questionnaire answers were all negative.        Per orders of Dr. Reagan, injection of TDAP given by Alicia Oliver MA. Patient instructed to remain in clinic for 15 minutes afterwards, and to report any adverse reaction to me immediately.       Screening performed by Alicia Oliver MA on 6/9/2020 at 8:04 AM.

## 2020-07-06 ENCOUNTER — MYC MEDICAL ADVICE (OUTPATIENT)
Dept: PEDIATRICS | Facility: CLINIC | Age: 60
End: 2020-07-06

## 2020-07-06 DIAGNOSIS — L98.9 SKIN LESION: Primary | ICD-10-CM

## 2020-07-23 ENCOUNTER — OFFICE VISIT (OUTPATIENT)
Dept: URGENT CARE | Facility: URGENT CARE | Age: 60
End: 2020-07-23
Payer: COMMERCIAL

## 2020-07-23 VITALS
SYSTOLIC BLOOD PRESSURE: 132 MMHG | BODY MASS INDEX: 36.04 KG/M2 | OXYGEN SATURATION: 97 % | DIASTOLIC BLOOD PRESSURE: 80 MMHG | HEART RATE: 74 BPM | TEMPERATURE: 98 F | RESPIRATION RATE: 12 BRPM | WEIGHT: 237 LBS

## 2020-07-23 DIAGNOSIS — H60.391 INFECTIVE OTITIS EXTERNA, RIGHT: Primary | ICD-10-CM

## 2020-07-23 DIAGNOSIS — L72.3 INFECTED SEBACEOUS CYST: ICD-10-CM

## 2020-07-23 DIAGNOSIS — L08.9 INFECTED SEBACEOUS CYST: ICD-10-CM

## 2020-07-23 PROCEDURE — 99214 OFFICE O/P EST MOD 30 MIN: CPT | Performed by: FAMILY MEDICINE

## 2020-07-23 RX ORDER — OFLOXACIN 3 MG/ML
5 SOLUTION AURICULAR (OTIC) 2 TIMES DAILY
Qty: 10 ML | Refills: 0 | Status: SHIPPED | OUTPATIENT
Start: 2020-07-23 | End: 2020-07-29

## 2020-07-23 NOTE — NURSING NOTE
"Chief Complaint   Patient presents with     Urgent Care     Ear Problem     Pt is having right ear pain x 1-2 weeeks.     Mass     Pt has a lump underneath his right arm pit.  He noticed it about a week ago and it seems to be getting bigger.  It feels uncomfortable.       Initial /80 (BP Location: Right arm, Patient Position: Sitting, Cuff Size: Adult Regular)   Pulse 74   Temp 98  F (36.7  C) (Oral)   Resp 12   Wt 107.5 kg (237 lb)   SpO2 97%   BMI 36.04 kg/m   Estimated body mass index is 36.04 kg/m  as calculated from the following:    Height as of 8/7/19: 1.727 m (5' 8\").    Weight as of this encounter: 107.5 kg (237 lb)..  BP completed using cuff size: regular  Flora Miller R.N.    "

## 2020-07-23 NOTE — PROGRESS NOTES
SUBJECTIVE:  Chief Complaint   Patient presents with     Urgent Care     Ear Problem     Pt is having right ear pain x 1-2 weeeks.     Mass     Pt has a lump underneath his right arm pit.  He noticed it about a week ago and it seems to be getting bigger.  It feels uncomfortable.       Yrn Will is a 60 year old male who presents with a chief complaint of right ear pain and mass in right axilla.    Developed right ear discomfort for the past 1-2 weeks.  Had similar symptoms before, commented that will get flare once a year.  Denies any Q-tip use.  Has gotten ear flushed out before due to ear wax.    Right axilla mass, thought it was a clogged pore.  Has gotten larger and more red.  Has been more painful.    Past Medical History:   Diagnosis Date     Esophageal reflux 9/16/2009     Gilbert's disease      Gout 9/16/2009     HTN (hypertension) 9/16/2009     Hyperlipidemia LDL goal <130 9/16/2009     Current Outpatient Medications   Medication Sig Dispense Refill     amLODIPine (NORVASC) 10 MG tablet Take 1 tablet (10 mg) by mouth daily 90 tablet 3     famotidine (PEPCID) 20 MG tablet Take 1-2 tablets (20-40 mg) by mouth daily 120 tablet 1     losartan (COZAAR) 25 MG tablet Take 1 tablet (25 mg) by mouth daily 90 tablet 3     omeprazole (PRILOSEC) 20 MG DR capsule TAKE ONE CAPSULE BY MOUTH EVERY DAY AS NEEDED 90 capsule 1     Sildenafil Citrate (REVATIO PO)        simvastatin (ZOCOR) 40 MG tablet TAKE ONE TABLET BY MOUTH EVERY DAY AT BEDTIME 90 tablet 3     Social History     Tobacco Use     Smoking status: Never Smoker     Smokeless tobacco: Never Used   Substance Use Topics     Alcohol use: No     Alcohol/week: 0.0 standard drinks       ROS:  Review of systems negative except as stated above.    EXAM:   /80 (BP Location: Right arm, Patient Position: Sitting, Cuff Size: Adult Regular)   Pulse 74   Temp 98  F (36.7  C) (Oral)   Resp 12   Wt 107.5 kg (237 lb)   SpO2 97%   BMI 36.04 kg/m    GENERAL  APPEARANCE: healthy, alert and no distress  EARS: left ear canal and left TM normal, right ear canal with mild erythema and swelling, right TM normal  EXTREMITIES: peripheral pulses normal  SKIN: right axilla with redden cyst, indurated, mild tenderness and warmth  PSYCH: alert, affect bright      ASSESSMENT/PLAN:  (H60.391) Infective otitis externa, right  (primary encounter diagnosis)  Plan: ofloxacin (FLOXIN) 0.3 % otic solution            (L72.3,  L08.9) Infected sebaceous cyst  Comment: right axialla  Plan: amoxicillin-clavulanate (AUGMENTIN) 875-125 MG         Tablet      Reassurance given, reviewed symptomatic treatment with tylenol, ibuprofen, warm compress.  RX floxin given for right otitis externa.  Reassurance given that does not need ear irrigation at this time.  RX Augmentin given for infected cyst, warm compress to area and refrain from trying to pick or squeeze area.    Follow up with primary provider if no improvement of symptoms in 1 week    Davey Tadeo MD  July 23, 2020 5:11 PM

## 2020-07-23 NOTE — PATIENT INSTRUCTIONS
Okay to take ibuprofen 200 mg - 4 tablets (800 mg) every 8 hours as needed.  Okay to take tylenol 500 mg - 2 tablets (1000 mg) every 6-8 hours as needed, do not exceed 3000 mg in 24 hours.  Use antibiotic ear drop in right ear twice a day for 7 days  Take full course of antibiotic to treat infected cyst.  Use warm compress to cyst area      Patient Education     External Ear Infection (Adult)    External otitis (also called  swimmer s ear ) is an infection in the ear canal. It is often caused by bacteria or fungus. It can occur a few days after water gets trapped in the ear canal (from swimming or bathing). It can also occur after cleaning too deeply in the ear canal with a cotton swab or other object. Sometimes, hair care products get into the ear canal and cause this problem.  Symptoms can include pain, fever, itching, redness, drainage, or swelling of the ear canal. Temporary hearing loss may also occur.  Home care    Do not try to clean the ear canal. This can push pus and bacteria deeper into the canal.    Use prescribed ear drops as directed. These help reduce swelling and fight the infection. If an ear wick was placed in the ear canal, apply drops right onto the end of the wick. The wick will draw the medicine into the ear canal even if it is swollen closed.    A cotton ball may be loosely placed in the outer ear to absorb any drainage.    You may use acetaminophen or ibuprofen to control pain, unless another medicine was prescribed. Note: If you have chronic liver or kidney disease or ever had a stomach ulcer or GI bleeding, talk to your healthcare provider before taking any of these medicines.    Do not allow water to get into your ear when bathing. Also, don't swim until the infection has cleared.  Prevention    Keep your ears dry. This helps lower the risk of infection. Dry your ears with a towel or hair dryer after getting wet. Also, use ear plugs when swimming.    Do not stick any objects in the ear to  remove wax.    If you feel water trapped in your ear, use ear drops right away. You can get these drops over the counter at most drugstores. They work by removing water from the ear canal.  Follow-up care  Follow up with your healthcare provider in 1 week, or as advised.  When to seek medical advice  Call your healthcare provider right away if any of these occur:    Ear pain becomes worse or doesn t improve after 3 days of treatment    Redness or swelling of the outer ear occurs or gets worse    Headache    Painful or stiff neck    Drowsiness or confusion    Fever of 100.4 F (38 C) or higher, or as directed by your healthcare provider    Seizure  Date Last Reviewed: 10/1/2017    [id number] [employer]. [address] [place] [address], [place] [id number]. All rights reserved. This information is not intended as a substitute for professional medical care. Always follow your healthcare professional's instructions.           Patient Education     Epidermoid Cyst (Sebaceous Cyst), Infected (Antibiotic Treatment)  You have an epidermoid cyst. This is a small, painless lump under your skin. An epidermoid cyst (often called a sebaceous cyst, epidermal cyst, or epidermal inclusion cyst) is a term most often used for 2 similar types of cysts:    Epidermoid cysts. These cysts form slowly under the skin. They can be found on most parts of the body. But they are most often found on areas with more hair such as the scalp, face, upper back, and genitals.    Pilar cysts. These are similar to epidermoid cysts. But they start from a different part of the hair follicle. They are more likely to be on the scalp.  Here are some general facts about these cysts:    A cyst is a sac filled with material that is often cheesy, fatty, oily, or stringy. The material inside can be thick. Or it can be a liquid.    You can usually move the cyst slightly if you try.    The cysts can be smaller than a pea or as large as a few inches.    The cysts  are usually not painful, unless they become inflamed or infected.    The area around the cyst may smell bad. If the cyst breaks open, the material inside it often smells bad as well.  Your cyst became infected and your healthcare provider wanted to treat it with antibiotics. If the antibiotics don t clear up the infection, the cyst will need to be drained by making a small cut (incision). Local anesthesia will be used to numb the area.  Home care    Resist the temptation to squeeze or pop the cyst, stick a needle in it, or cut it open. This often leads to a worsening infection and scarring.    Take the antibiotic as directed until it is all used up.    Soak the affected area in hot water or apply a hot pack (a thin, clean towel soaked in hot water) for 20 minutes at a time. Do this 3 to 4 times a day.    Apply antibiotic cream or ointment 3 times a day.    You may use over-the-counter pain medicine to control pain, unless another medicine was given. If you have chronic liver or kidney disease or ever had a stomach ulcer or GI bleeding, talk with your healthcare provider before using these medicines.  Prevention  Once this infection has healed, reduce the risk of future infections by:    Keeping the cyst area clean by bathing or showering daily    Avoiding tight-fitting clothing in the cyst area  Follow-up care  Follow up with your healthcare provider, or as advised. If a gauze packing was put in your wound, it should be removed in a few days as advised by your healthcare provider. Check your wound every day for the signs listed below.  When to seek medical advice  Call your healthcare provider right away if any of these occur:    Pus coming from the cyst    Increasing redness around the wound    Increasing local pain or swelling    Fever of 100.4 F (38 C) or higher, or as directed by your provider  Date Last Reviewed: 10/5/2016    [id number] [employer]. [address] [place] [address], [place] [address]. All  rights reserved. This information is not intended as a substitute for professional medical care. Always follow your healthcare professional's instructions.

## 2020-07-29 ENCOUNTER — OFFICE VISIT (OUTPATIENT)
Dept: PEDIATRICS | Facility: CLINIC | Age: 60
End: 2020-07-29
Payer: COMMERCIAL

## 2020-07-29 VITALS
SYSTOLIC BLOOD PRESSURE: 130 MMHG | HEART RATE: 86 BPM | RESPIRATION RATE: 16 BRPM | TEMPERATURE: 97.7 F | DIASTOLIC BLOOD PRESSURE: 64 MMHG | OXYGEN SATURATION: 96 % | BODY MASS INDEX: 34.51 KG/M2 | HEIGHT: 69 IN | WEIGHT: 233 LBS

## 2020-07-29 DIAGNOSIS — J86.9 ABSCESS OF CHEST (H): ICD-10-CM

## 2020-07-29 DIAGNOSIS — H60.391 INFECTIVE OTITIS EXTERNA, RIGHT: Primary | ICD-10-CM

## 2020-07-29 PROCEDURE — 87186 SC STD MICRODIL/AGAR DIL: CPT | Performed by: PHYSICIAN ASSISTANT

## 2020-07-29 PROCEDURE — 87077 CULTURE AEROBIC IDENTIFY: CPT | Performed by: PHYSICIAN ASSISTANT

## 2020-07-29 PROCEDURE — 99214 OFFICE O/P EST MOD 30 MIN: CPT | Performed by: PHYSICIAN ASSISTANT

## 2020-07-29 PROCEDURE — 87070 CULTURE OTHR SPECIMN AEROBIC: CPT | Performed by: PHYSICIAN ASSISTANT

## 2020-07-29 RX ORDER — DOXYCYCLINE HYCLATE 100 MG
100 TABLET ORAL 2 TIMES DAILY
Qty: 20 TABLET | Refills: 0 | Status: SHIPPED | OUTPATIENT
Start: 2020-07-29 | End: 2021-06-14

## 2020-07-29 RX ORDER — OFLOXACIN 3 MG/ML
5 SOLUTION AURICULAR (OTIC) 2 TIMES DAILY
Qty: 10 ML | Refills: 0 | Status: SHIPPED | OUTPATIENT
Start: 2020-07-29 | End: 2021-06-14

## 2020-07-29 ASSESSMENT — MIFFLIN-ST. JEOR: SCORE: 1839.32

## 2020-07-29 NOTE — PROGRESS NOTES
"Subjective     Yrn Will is a 60 year old male who presents to clinic today for the following health issues:    HPI   Concern - Ear Problem    Onset: 1 week     Description:   Right ear, drainage and pain     Intensity: moderate    Progression of Symptoms:  worsening    Accompanying Signs & Symptoms:  none    Previous history of similar problem:   YES - yearly     Precipitating factors:   Worsened by: none    Alleviating factors:  Improved by: none  Therapies Tried and outcome: ofloxacin ear drops didn't help     Patient seen in UC one week ago.     In addition, patient has chest wall abscess. Not improving much. On augmentin.    Review of Systems   Constitutional, HEENT, cardiovascular, pulmonary, gi and gu systems are negative, except as otherwise noted.      Objective    /64 (BP Location: Right arm, Patient Position: Chair, Cuff Size: Adult Large)   Pulse 86   Temp 97.7  F (36.5  C) (Tympanic)   Resp 16   Ht 1.74 m (5' 8.5\")   Wt 105.7 kg (233 lb)   SpO2 96%   BMI 34.91 kg/m    Body mass index is 34.91 kg/m .  Physical Exam   GENERAL: alert and no distress  EYES: Eyes grossly normal to inspection, PERRL and conjunctivae and sclerae normal  HENT: ear canals and TM's cerumen impactions bilaterally; resolved with ear irrigation; erythemic, edematous right ear canal. Very tender. nose and mouth without ulcers or lesions  NECK: no adenopathy  RESP: lungs clear to auscultation - no rales, rhonchi or wheezes  CV: regular rate and rhythm, normal S1 S2, no S3 or S4  Chest wall: inspection of the right anterior chest wall reveals a 2 cm dm raised, slightly fluctuant, mass. Mildly tender. Pinpoint incision made and culture obtained.    Diagnostic Test Results:  No results found for this or any previous visit (from the past 24 hour(s)).        Assessment & Plan   1. Infective otitis externa, right  Ear canal irrigated and now drops can penetrate. No water in ear.  - ofloxacin (FLOXIN) 0.3 % otic solution; " Place 5 drops into the right ear 2 times daily  Dispense: 10 mL; Refill: 0    2. Abscess of chest (H)  Culture pending. Stop augmentin and begin doxy.  - doxycycline hyclate (VIBRA-TABS) 100 MG tablet; Take 1 tablet (100 mg) by mouth 2 times daily  Dispense: 20 tablet; Refill: 0  - Wound Culture Aerobic Bacterial     Quinn Frankel PA-C  [facility]

## 2020-08-01 LAB
BACTERIA SPEC CULT: ABNORMAL
BACTERIA SPEC CULT: ABNORMAL
Lab: ABNORMAL
SPECIMEN SOURCE: ABNORMAL

## 2020-08-03 ENCOUNTER — OFFICE VISIT (OUTPATIENT)
Dept: PEDIATRICS | Facility: CLINIC | Age: 60
End: 2020-08-03
Payer: COMMERCIAL

## 2020-08-03 VITALS
HEART RATE: 82 BPM | HEIGHT: 69 IN | DIASTOLIC BLOOD PRESSURE: 74 MMHG | BODY MASS INDEX: 34.91 KG/M2 | TEMPERATURE: 97.5 F | SYSTOLIC BLOOD PRESSURE: 122 MMHG | WEIGHT: 235.7 LBS | OXYGEN SATURATION: 96 % | RESPIRATION RATE: 18 BRPM

## 2020-08-03 DIAGNOSIS — H60.391 INFECTIVE OTITIS EXTERNA, RIGHT: Primary | ICD-10-CM

## 2020-08-03 DIAGNOSIS — L02.213 CHEST WALL ABSCESS: ICD-10-CM

## 2020-08-03 PROBLEM — E66.01 MORBID OBESITY (H): Status: ACTIVE | Noted: 2020-08-03

## 2020-08-03 PROCEDURE — 99213 OFFICE O/P EST LOW 20 MIN: CPT | Performed by: PHYSICIAN ASSISTANT

## 2020-08-03 ASSESSMENT — MIFFLIN-ST. JEOR: SCORE: 1851.57

## 2020-08-03 NOTE — PROGRESS NOTES
"Subjective     Yrn Will is a 60 year old male who presents to clinic today for the following health issues:    HPI   Concern - Ongoing ear infection   Onset: Started on 7/29/2020    Description:   Right ear still plugged- difficulty hearing     Intensity: moderate    Progression of Symptoms:  Same- small improvement     Accompanying Signs & Symptoms:  NA    Previous history of similar problem:   None     Precipitating factors:   Worsened by: When laying down on right ear    Alleviating factors:  Improved by: Heat pack, helps wax drain.   Therapies Tried and outcome:   -ofloxacin (FLOXIN) 0.3 % otic solution. Not sure if they are helping him.     Review of Systems   Constitutional, HEENT, cardiovascular, pulmonary, gi and gu systems are negative, except as otherwise noted.      Objective    /74 (BP Location: Right arm, Patient Position: Chair, Cuff Size: Adult Large)   Pulse 82   Temp 97.5  F (36.4  C) (Tympanic)   Resp 18   Ht 1.74 m (5' 8.5\")   Wt 106.9 kg (235 lb 11.2 oz)   SpO2 96%   BMI 35.32 kg/m    Body mass index is 35.32 kg/m .  Physical Exam   GENERAL: alert and no distress  EAR: right ear canal erythemic and edematous. Significant cerumen noted.  CHEST WALL: abscess is improving. Mildly tender. No discharge.    Diagnostic Test Results:  No results found for this or any previous visit (from the past 24 hour(s)).        Assessment & Plan   1. Infective otitis externa, right  Referral to ENT for further management. Appointment scheduled.   - OTOLARYNGOLOGY REFERRAL    2. Chest wall abscess  Improving. Finish antibiotics.       Quinn Frankel PA-C  [facility]    "

## 2020-08-05 ENCOUNTER — TRANSFERRED RECORDS (OUTPATIENT)
Dept: HEALTH INFORMATION MANAGEMENT | Facility: CLINIC | Age: 60
End: 2020-08-05

## 2020-08-12 ENCOUNTER — TRANSFERRED RECORDS (OUTPATIENT)
Dept: HEALTH INFORMATION MANAGEMENT | Facility: CLINIC | Age: 60
End: 2020-08-12

## 2020-10-15 ENCOUNTER — MYC MEDICAL ADVICE (OUTPATIENT)
Dept: PEDIATRICS | Facility: CLINIC | Age: 60
End: 2020-10-15

## 2020-10-15 DIAGNOSIS — K21.9 GASTROESOPHAGEAL REFLUX DISEASE WITHOUT ESOPHAGITIS: ICD-10-CM

## 2020-11-09 NOTE — TELEPHONE ENCOUNTER
Routing to Dr. Reagan to advise on going back to Omeprazole and sending new rx.     Patient tried taking Pepcid instead of Omeprazole.   Reported severe reflux issues.   Would like to go back.     See [employer] message for details.

## 2021-01-15 ENCOUNTER — HEALTH MAINTENANCE LETTER (OUTPATIENT)
Age: 61
End: 2021-01-15

## 2021-02-10 DIAGNOSIS — I10 ESSENTIAL HYPERTENSION WITH GOAL BLOOD PRESSURE LESS THAN 140/90: ICD-10-CM

## 2021-02-10 DIAGNOSIS — K21.9 GASTROESOPHAGEAL REFLUX DISEASE WITHOUT ESOPHAGITIS: ICD-10-CM

## 2021-02-11 RX ORDER — LOSARTAN POTASSIUM 25 MG/1
TABLET ORAL
Qty: 90 TABLET | Refills: 3 | Status: SHIPPED | OUTPATIENT
Start: 2021-02-11 | End: 2021-09-30

## 2021-02-11 NOTE — TELEPHONE ENCOUNTER
Omeprazole: Prescription approved per [facility], [facility] or [facility] refill protocol.  Blessing HICKS - Registered Nurse  [facility]  Acute and Diagnostic Services

## 2021-06-14 ENCOUNTER — OFFICE VISIT (OUTPATIENT)
Dept: URGENT CARE | Facility: URGENT CARE | Age: 61
End: 2021-06-14
Payer: COMMERCIAL

## 2021-06-14 VITALS
BODY MASS INDEX: 35.21 KG/M2 | HEART RATE: 84 BPM | SYSTOLIC BLOOD PRESSURE: 132 MMHG | DIASTOLIC BLOOD PRESSURE: 92 MMHG | OXYGEN SATURATION: 97 % | WEIGHT: 235 LBS | TEMPERATURE: 96.5 F

## 2021-06-14 DIAGNOSIS — R05.9 COUGH: Primary | ICD-10-CM

## 2021-06-14 PROCEDURE — U0005 INFEC AGEN DETEC AMPLI PROBE: HCPCS | Performed by: PHYSICIAN ASSISTANT

## 2021-06-14 PROCEDURE — 99213 OFFICE O/P EST LOW 20 MIN: CPT | Performed by: PHYSICIAN ASSISTANT

## 2021-06-14 PROCEDURE — U0003 INFECTIOUS AGENT DETECTION BY NUCLEIC ACID (DNA OR RNA); SEVERE ACUTE RESPIRATORY SYNDROME CORONAVIRUS 2 (SARS-COV-2) (CORONAVIRUS DISEASE [COVID-19]), AMPLIFIED PROBE TECHNIQUE, MAKING USE OF HIGH THROUGHPUT TECHNOLOGIES AS DESCRIBED BY CMS-2020-01-R: HCPCS | Performed by: PHYSICIAN ASSISTANT

## 2021-06-14 RX ORDER — DOXYCYCLINE 100 MG/1
100 CAPSULE ORAL 2 TIMES DAILY
Qty: 20 CAPSULE | Refills: 0 | Status: SHIPPED | OUTPATIENT
Start: 2021-06-14 | End: 2021-06-24

## 2021-06-14 NOTE — PROGRESS NOTES
SUBJECTIVE:   Yrn Will is a 61 year old male presenting with a chief complaint of runny nose, stuffy nose, cough - non-productive, ear pain bilateral.  Chills at times.  Has baseline little SOB but no chest pain.   Hx of OM and pneumonia  .hx of sinus issues and getting some pressure now.   Had COVID shot in April.    Onset of symptoms was 4 day(s) ago.  Course of illness is worsening.    Severity moderate  Current and Associated symptoms: negative other than stated above  Treatment measures tried include Fluids, Rest and OTC..  Predisposing factors include as above.    Past Medical History:   Diagnosis Date     Esophageal reflux 9/16/2009     Gilbert's disease      Gout 9/16/2009     HTN (hypertension) 9/16/2009     Hyperlipidemia LDL goal <130 9/16/2009     Current Outpatient Medications   Medication Sig Dispense Refill     amLODIPine (NORVASC) 10 MG tablet Take 1 tablet (10 mg) by mouth daily 90 tablet 3     doxycycline hyclate (VIBRAMYCIN) 100 MG capsule Take 1 capsule (100 mg) by mouth 2 times daily for 10 days 20 capsule 0     losartan (COZAAR) 25 MG tablet TAKE ONE TABLET BY MOUTH EVERY DAY 90 tablet 3     omeprazole (PRILOSEC) 20 MG DR capsule TAKE ONE CAPSULE BY MOUTH EVERY DAY AS NEEDED 90 capsule 1     Sildenafil Citrate (REVATIO PO)        simvastatin (ZOCOR) 40 MG tablet TAKE ONE TABLET BY MOUTH EVERY DAY AT BEDTIME 90 tablet 3     Social History     Tobacco Use     Smoking status: Never Smoker     Smokeless tobacco: Never Used   Substance Use Topics     Alcohol use: No     Alcohol/week: 0.0 standard drinks       ROS:  Review of systems negative except as stated above.    OBJECTIVE:  BP (!) 132/92   Pulse 84   Temp 96.5  F (35.8  C)   Wt 106.6 kg (235 lb)   SpO2 97%   BMI 35.21 kg/m    GENERAL APPEARANCE: healthy, alert and no distress  EYES: EOMI,  PERRL, conjunctiva clear  HENT: ear canals and TM's normal.  Nose and mouth without ulcers, erythema or lesions  NECK: supple, nontender, no  lymphadenopathy  RESP: lungs clear to auscultation - no rales, rhonchi or wheezes  CV: regular rates and rhythm, normal S1 S2, no murmur noted  NEURO: Normal strength and tone, sensory exam grossly normal,  normal speech and mentation  SKIN: no suspicious lesions or rashes    assessment/plan:  (R05) Cough  (primary encounter diagnosis)  Comment:   Plan: Symptomatic COVID-19 Virus (Coronavirus) by         PCR, doxycycline hyclate (VIBRAMYCIN) 100 MG         capsule, SARS-CoV-2 COVID-19 Virus         (Coronavirus) by PCR          No clear infection and exam reassured. Continue with OTC med for sx relief  If sx persist Doxy as directed and to Follow-up with PCP as needed

## 2021-06-15 LAB
LABORATORY COMMENT REPORT: NORMAL
SARS-COV-2 RNA RESP QL NAA+PROBE: NEGATIVE
SARS-COV-2 RNA RESP QL NAA+PROBE: NORMAL
SPECIMEN SOURCE: NORMAL
SPECIMEN SOURCE: NORMAL

## 2021-08-13 DIAGNOSIS — E78.5 HYPERLIPIDEMIA LDL GOAL <130: ICD-10-CM

## 2021-08-13 DIAGNOSIS — K21.9 GASTROESOPHAGEAL REFLUX DISEASE WITHOUT ESOPHAGITIS: ICD-10-CM

## 2021-08-13 DIAGNOSIS — I10 ESSENTIAL HYPERTENSION WITH GOAL BLOOD PRESSURE LESS THAN 140/90: ICD-10-CM

## 2021-08-16 RX ORDER — AMLODIPINE BESYLATE 10 MG/1
TABLET ORAL
Qty: 90 TABLET | Refills: 0 | Status: SHIPPED | OUTPATIENT
Start: 2021-08-16 | End: 2021-09-30

## 2021-08-16 RX ORDER — SIMVASTATIN 40 MG
TABLET ORAL
Qty: 90 TABLET | Refills: 0 | Status: SHIPPED | OUTPATIENT
Start: 2021-08-16 | End: 2021-09-30

## 2021-08-16 NOTE — TELEPHONE ENCOUNTER
Medication is being filled for 1 time refill only due to:  Patient needs to be seen because it has been more than one year since last visit.  Routed to  for scheduling  Prescription approved per [facility] Refill Protocol.  Juliana Payne RN, BSN  Message handled by CLINIC NURSE.

## 2021-09-04 ENCOUNTER — HEALTH MAINTENANCE LETTER (OUTPATIENT)
Age: 61
End: 2021-09-04

## 2021-09-30 ENCOUNTER — OFFICE VISIT (OUTPATIENT)
Dept: PEDIATRICS | Facility: CLINIC | Age: 61
End: 2021-09-30
Payer: COMMERCIAL

## 2021-09-30 VITALS
HEIGHT: 69 IN | TEMPERATURE: 97.7 F | HEART RATE: 74 BPM | BODY MASS INDEX: 35.04 KG/M2 | OXYGEN SATURATION: 97 % | RESPIRATION RATE: 14 BRPM | DIASTOLIC BLOOD PRESSURE: 72 MMHG | WEIGHT: 236.6 LBS | SYSTOLIC BLOOD PRESSURE: 128 MMHG

## 2021-09-30 DIAGNOSIS — C61 PROSTATE CANCER (H): ICD-10-CM

## 2021-09-30 DIAGNOSIS — E78.5 HYPERLIPIDEMIA LDL GOAL <130: ICD-10-CM

## 2021-09-30 DIAGNOSIS — N18.30 STAGE 3 CHRONIC KIDNEY DISEASE, UNSPECIFIED WHETHER STAGE 3A OR 3B CKD (H): ICD-10-CM

## 2021-09-30 DIAGNOSIS — I10 ESSENTIAL HYPERTENSION WITH GOAL BLOOD PRESSURE LESS THAN 140/90: ICD-10-CM

## 2021-09-30 DIAGNOSIS — Z00.00 ROUTINE GENERAL MEDICAL EXAMINATION AT A HEALTH CARE FACILITY: Primary | ICD-10-CM

## 2021-09-30 DIAGNOSIS — E66.01 MORBID OBESITY (H): ICD-10-CM

## 2021-09-30 DIAGNOSIS — K21.9 GASTROESOPHAGEAL REFLUX DISEASE WITHOUT ESOPHAGITIS: ICD-10-CM

## 2021-09-30 PROBLEM — K62.89 ANAL PAIN: Status: ACTIVE | Noted: 2021-09-30

## 2021-09-30 PROBLEM — K60.2 ANAL FISSURE: Status: ACTIVE | Noted: 2021-09-30

## 2021-09-30 LAB
ALBUMIN SERPL-MCNC: 4 G/DL (ref 3.4–5)
ALP SERPL-CCNC: 128 U/L (ref 40–150)
ALT SERPL W P-5'-P-CCNC: 63 U/L (ref 0–70)
ANION GAP SERPL CALCULATED.3IONS-SCNC: 5 MMOL/L (ref 3–14)
AST SERPL W P-5'-P-CCNC: 39 U/L (ref 0–45)
BILIRUB SERPL-MCNC: 2.2 MG/DL (ref 0.2–1.3)
BUN SERPL-MCNC: 15 MG/DL (ref 7–30)
CALCIUM SERPL-MCNC: 8.7 MG/DL (ref 8.5–10.1)
CHLORIDE BLD-SCNC: 107 MMOL/L (ref 94–109)
CHOLEST SERPL-MCNC: 189 MG/DL
CO2 SERPL-SCNC: 25 MMOL/L (ref 20–32)
CREAT SERPL-MCNC: 1.46 MG/DL (ref 0.66–1.25)
FASTING STATUS PATIENT QL REPORTED: YES
GFR SERPL CREATININE-BSD FRML MDRD: 50 ML/MIN/1.73M2
GLUCOSE BLD-MCNC: 122 MG/DL (ref 70–99)
HBA1C MFR BLD: 5.8 % (ref 0–5.6)
HDLC SERPL-MCNC: 52 MG/DL
LDLC SERPL CALC-MCNC: 114 MG/DL
NONHDLC SERPL-MCNC: 137 MG/DL
POTASSIUM BLD-SCNC: 3.8 MMOL/L (ref 3.4–5.3)
PROT SERPL-MCNC: 7.7 G/DL (ref 6.8–8.8)
PSA SERPL-MCNC: <0.01 UG/L (ref 0–4)
SODIUM SERPL-SCNC: 137 MMOL/L (ref 133–144)
TRIGL SERPL-MCNC: 115 MG/DL

## 2021-09-30 PROCEDURE — 80061 LIPID PANEL: CPT | Performed by: INTERNAL MEDICINE

## 2021-09-30 PROCEDURE — 36415 COLL VENOUS BLD VENIPUNCTURE: CPT | Performed by: INTERNAL MEDICINE

## 2021-09-30 PROCEDURE — 84153 ASSAY OF PSA TOTAL: CPT | Performed by: INTERNAL MEDICINE

## 2021-09-30 PROCEDURE — 83036 HEMOGLOBIN GLYCOSYLATED A1C: CPT | Performed by: INTERNAL MEDICINE

## 2021-09-30 PROCEDURE — 90471 IMMUNIZATION ADMIN: CPT | Performed by: INTERNAL MEDICINE

## 2021-09-30 PROCEDURE — 90682 RIV4 VACC RECOMBINANT DNA IM: CPT | Performed by: INTERNAL MEDICINE

## 2021-09-30 PROCEDURE — 99396 PREV VISIT EST AGE 40-64: CPT | Mod: 25 | Performed by: INTERNAL MEDICINE

## 2021-09-30 PROCEDURE — 80053 COMPREHEN METABOLIC PANEL: CPT | Performed by: INTERNAL MEDICINE

## 2021-09-30 RX ORDER — SIMVASTATIN 40 MG
TABLET ORAL
Qty: 90 TABLET | Refills: 3 | Status: SHIPPED | OUTPATIENT
Start: 2021-09-30 | End: 2022-09-27

## 2021-09-30 RX ORDER — LOSARTAN POTASSIUM 25 MG/1
25 TABLET ORAL DAILY
Qty: 90 TABLET | Refills: 3 | Status: SHIPPED | OUTPATIENT
Start: 2021-09-30 | End: 2022-09-27

## 2021-09-30 RX ORDER — INFLUENZA A VIRUS A/HAWAII/70/2019 (H1N1) RECOMBINANT HEMAGGLUTININ ANTIGEN, INFLUENZA A VIRUS A/MINNESOTA/41/2019 (H3N2) RECOMBINANT HEMAGGLUTININ ANTIGEN, INFLUENZA B VIRUS B/WASHINGTON/02/2019 RECOMBINANT HEMAGGLUTININ ANTIGEN, AND INFLUENZA B VIRUS B/PHUKET/3073/2013 RECOMBINANT HEMAGGLUTININ ANTIGEN 45; 45; 45; 45 UG/.5ML; UG/.5ML; UG/.5ML; UG/.5ML
INJECTION INTRAMUSCULAR
COMMUNITY
Start: 2020-10-24 | End: 2021-09-30

## 2021-09-30 RX ORDER — AMLODIPINE BESYLATE 10 MG/1
10 TABLET ORAL DAILY
Qty: 90 TABLET | Refills: 3 | Status: SHIPPED | OUTPATIENT
Start: 2021-09-30 | End: 2022-09-27

## 2021-09-30 ASSESSMENT — ENCOUNTER SYMPTOMS
CONSTIPATION: 0
HEMATURIA: 0
SHORTNESS OF BREATH: 0
JOINT SWELLING: 0
FEVER: 0
HEADACHES: 0
SORE THROAT: 0
NAUSEA: 0
FREQUENCY: 0
DYSURIA: 0
DIZZINESS: 0
NERVOUS/ANXIOUS: 0
PARESTHESIAS: 0
CHILLS: 0
HEMATOCHEZIA: 0
MYALGIAS: 0
ABDOMINAL PAIN: 0
COUGH: 0
ARTHRALGIAS: 0
WEAKNESS: 0
DIARRHEA: 0
EYE PAIN: 0
HEARTBURN: 0
PALPITATIONS: 0

## 2021-09-30 ASSESSMENT — MIFFLIN-ST. JEOR: SCORE: 1850.65

## 2021-09-30 NOTE — PROGRESS NOTES
SUBJECTIVE:   CC: Yrn Will is an 61 year old male who presents for preventative health visit.   Patient has been advised of split billing requirements and indicates understanding: Yes     Healthy Habits:     Getting at least 3 servings of Calcium per day:  Yes    Bi-annual eye exam:  Yes    Dental care twice a year:  Yes    Sleep apnea or symptoms of sleep apnea:  None    Diet:  Regular (no restrictions)    Frequency of exercise:  2-3 days/week    Duration of exercise:  15-30 minutes    Taking medications regularly:  No    Medication side effects:  None    PHQ-2 Total Score: 0    Additional concerns today:  No    Today's PHQ-2 Score:   PHQ-2 ( 1999 [employer]) 9/30/2021   Q1: Little interest or pleasure in doing things 0   Q2: Feeling down, depressed or hopeless 0   PHQ-2 Score 0   Q1: Little interest or pleasure in doing things Not at all   Q2: Feeling down, depressed or hopeless Not at all   PHQ-2 Score 0     Abuse: Current or Past(Physical, Sexual or Emotional)- No  Do you feel safe in your environment? Yes    Have you ever done Advance Care Planning? (For example, a Health Directive, POLST, or a discussion with a medical provider or your loved ones about your wishes): Yes, patient states has an Advance Care Planning document and will bring a copy to the clinic.    Social History     Tobacco Use     Smoking status: Never Smoker     Smokeless tobacco: Never Used   Substance Use Topics     Alcohol use: No     Alcohol/week: 0.0 standard drinks     Alcohol Use 9/30/2021   Prescreen: >3 drinks/day or >7 drinks/week? Not Applicable   Prescreen: >3 drinks/day or >7 drinks/week? -     Last PSA:   PSA   Date Value Ref Range Status   06/09/2020 <0.01 0 - 4 ug/L Final     Comment:     Assay Method:  Chemiluminescence using Siemens Vista analyzer     Reviewed orders with patient. Reviewed health maintenance and updated orders accordingly - Yes  Patient Active Problem List   Diagnosis     Hyperlipidemia LDL goal <130      Hepatic steatosis     Gilbert's disease     Prediabetes     CKD (chronic kidney disease) stage 3, GFR 30-59 ml/min     Essential hypertension with goal blood pressure less than 140/90     Gout, unspecified cause, unspecified chronicity, unspecified site     Gastroesophageal reflux disease without esophagitis     Obesity, unspecified obesity severity, unspecified obesity type     Prostate cancer (H)     Stress incontinence of urine     Calculus of gallbladder without cholecystitis     Morbid obesity (H)     Past Surgical History:   Procedure Laterality Date     BIOPSY PROSTATE TRANSRECTAL  2017    bx showed BPH, negative for prostate cancer     DAVINCI PROSTATECTOMY  02/2018     LASIK  2006     repair right hydronephrosis  Age 14       Social History     Tobacco Use     Smoking status: Never Smoker     Smokeless tobacco: Never Used   Substance Use Topics     Alcohol use: No     Alcohol/week: 0.0 standard drinks     Family History   Problem Relation Age of Onset     C.A.D. Brother         stent, age 56     C.A.D. Maternal Uncle         MI in 50's     Hypertension Father      Hypertension Mother      Cancer - colorectal No family hx of      Prostate Cancer No family hx of          Current Outpatient Medications   Medication Sig Dispense Refill     amLODIPine (NORVASC) 10 MG tablet Take 1 tablet (10 mg) by mouth daily 90 tablet 3     losartan (COZAAR) 25 MG tablet Take 1 tablet (25 mg) by mouth daily 90 tablet 3     omeprazole (PRILOSEC) 20 MG DR capsule Take 1 capsule (20 mg) by mouth daily as needed (reflux) 90 capsule 3     Sildenafil Citrate (REVATIO PO)        simvastatin (ZOCOR) 40 MG tablet TAKE ONE TABLET BY MOUTH AT BEDTIME 90 tablet 3       Reviewed and updated as needed this visit by clinical staff   Allergies  Meds              Reviewed and updated as needed this visit by Provider                  Review of Systems   Constitutional: Negative for chills and fever.   HENT: Negative for congestion, ear pain,  "hearing loss and sore throat.    Eyes: Negative for pain and visual disturbance.   Respiratory: Negative for cough and shortness of breath.    Cardiovascular: Negative for chest pain, palpitations and peripheral edema.   Gastrointestinal: Negative for abdominal pain, constipation, diarrhea, heartburn, hematochezia and nausea.   Genitourinary: Negative for discharge, dysuria, frequency, genital sores, hematuria, impotence and urgency.   Musculoskeletal: Negative for arthralgias, joint swelling and myalgias.   Skin: Negative for rash.   Neurological: Negative for dizziness, weakness, headaches and paresthesias.   Psychiatric/Behavioral: Negative for mood changes. The patient is not nervous/anxious.        OBJECTIVE:   /72 (BP Location: Right arm, Patient Position: Sitting, Cuff Size: Adult Large)   Pulse 74   Temp 97.7  F (36.5  C) (Tympanic)   Resp 14   Ht 1.74 m (5' 8.5\")   Wt 107.3 kg (236 lb 9.6 oz)   SpO2 97%   BMI 35.45 kg/m      Physical Exam  GENERAL: healthy, alert and no distress  EYES: Eyes grossly normal to inspection, PERRL and conjunctivae and sclerae normal  HENT: ear canals and TM's normal, nose and mouth without ulcers or lesions  NECK: no adenopathy, no asymmetry, masses, or scars and thyroid normal to palpation  RESP: lungs clear to auscultation - no rales, rhonchi or wheezes  CV: regular rate and rhythm, normal S1 S2, no S3 or S4, no murmur, click or rub, no peripheral edema and peripheral pulses strong  ABDOMEN: soft, nontender, no hepatosplenomegaly, no masses and bowel sounds normal  MS: no gross musculoskeletal defects noted, no edema  SKIN: no suspicious lesions or rashes  NEURO: Normal strength and tone, mentation intact and speech normal  PSYCH: mentation appears normal, affect normal/bright    Diagnostic Test Results:  Labs reviewed in Epic    ASSESSMENT/PLAN:   (Z00.00) Routine general medical examination at a health care facility  (primary encounter diagnosis)    (E66.01) " "Morbid obesity (H)  Comment:     Weight mgmt discussed, referral to medical weight mgmt clinic  Plan: Hemoglobin A1c          (C61) Prostate cancer (H)  Comment: prior prostatectomy [facility], due for labwork  Plan: PSA, tumor marker          (I10) Essential hypertension with goal blood pressure less than 140/90  Comment:    Well controlled  Plan: amLODIPine (NORVASC) 10 MG tablet, losartan         (COZAAR) 25 MG tablet          (N18.30) Stage 3 chronic kidney disease, unspecified whether stage 3a or 3b CKD (H)  Comment:    stable  Lab Results   Component Value Date    CR 1.31 06/09/2020    CR 1.55 08/15/2019     (E78.5) Hyperlipidemia LDL goal <130  Comment:   Plan:  simvastatin         (ZOCOR) 40 MG tablet, Lipid panel reflex to         direct LDL Fasting, Comprehensive metabolic         panel          (K21.9) Gastroesophageal reflux disease without esophagitis  Comment: sx well controlled  Plan: omeprazole (PRILOSEC) 20 MG DR capsule            COUNSELING:   Reviewed preventive health counseling, as reflected in patient instructions    Estimated body mass index is 35.45 kg/m  as calculated from the following:    Height as of this encounter: 1.74 m (5' 8.5\").    Weight as of this encounter: 107.3 kg (236 lb 9.6 oz).     Weight management plan: Patient referred to endocrine and/or weight management specialty    He reports that he has never smoked. He has never used smokeless tobacco.      Counseling Resources:  ATP IV Guidelines  Pooled Cohorts Equation Calculator  FRAX Risk Assessment  ICSI Preventive Guidelines  Dietary Guidelines for Americans, 2010  USDA's MyPlate  ASA Prophylaxis  Lung CA Screening    Enrico Reagan MD  [facility]  "

## 2022-04-16 ENCOUNTER — OFFICE VISIT (OUTPATIENT)
Dept: URGENT CARE | Facility: URGENT CARE | Age: 62
End: 2022-04-16
Payer: COMMERCIAL

## 2022-04-16 VITALS
OXYGEN SATURATION: 97 % | TEMPERATURE: 96.9 F | DIASTOLIC BLOOD PRESSURE: 87 MMHG | HEART RATE: 82 BPM | WEIGHT: 236 LBS | BODY MASS INDEX: 35.36 KG/M2 | SYSTOLIC BLOOD PRESSURE: 154 MMHG

## 2022-04-16 DIAGNOSIS — J01.90 ACUTE SINUSITIS WITH SYMPTOMS > 10 DAYS: Primary | ICD-10-CM

## 2022-04-16 PROCEDURE — 99213 OFFICE O/P EST LOW 20 MIN: CPT | Performed by: PHYSICIAN ASSISTANT

## 2022-04-16 NOTE — PROGRESS NOTES
SUBJECTIVE:  Yrn Will is a 62 year old male here with concerns about sinus infection.  He states onset of symptoms were 2 week(s) ago.  He has had maxillary pressure. Course of illness is worsening. Severity moderate  Current and Associated symptoms: nasal congestion, rhinorrhea, cough , facial pain/pressure and post-nasal drainage  Predisposing factors include hx of sinus issues . Recent treatment has included: OTC meds    Past Medical History:   Diagnosis Date     Esophageal reflux 9/16/2009     Gilbert's disease      Gout 9/16/2009     HTN (hypertension) 9/16/2009     Hyperlipidemia LDL goal <130 9/16/2009     Social History     Tobacco Use     Smoking status: Never Smoker     Smokeless tobacco: Never Used   Substance Use Topics     Alcohol use: No     Alcohol/week: 0.0 standard drinks       ROS:  Review of systems negative except as stated above.    OBJECTIVE:  BP (!) 154/87   Pulse 82   Temp 96.9  F (36.1  C)   Wt 107 kg (236 lb)   SpO2 97%   BMI 35.36 kg/m    Exam:GENERAL APPEARANCE: healthy, alert and no distress  EYES: EOMI,  PERRL, conjunctiva clear  HENT: TM's normal bilaterally, nasal turbinates erythematous, swollen, oral mucous membranes moist, no erythema noted, maxillary sinus tenderness  and PND noted  NECK: supple, nontender, no lymphadenopathy  RESP: lungs clear to auscultation - no rales, rhonchi or wheezes  CV: regular rates and rhythm, normal S1 S2, no murmur noted  SKIN: no suspicious lesions or rashes    assessment/plan:  (J01.90) Acute sinusitis with symptoms > 10 days  (primary encounter diagnosis)  Comment:   Plan: amoxicillin-clavulanate (AUGMENTIN) 875-125 MG         tablet         Med as directed and OTC med for sx relief, increase fluids, hot packs to face,   Follow-up with PCP as needed if sx worsen or new sx develop

## 2022-05-27 ENCOUNTER — VIRTUAL VISIT (OUTPATIENT)
Dept: SURGERY | Facility: CLINIC | Age: 62
End: 2022-05-27
Payer: COMMERCIAL

## 2022-05-27 VITALS — WEIGHT: 230 LBS | HEIGHT: 69 IN | BODY MASS INDEX: 34.07 KG/M2

## 2022-05-27 DIAGNOSIS — N18.31 STAGE 3A CHRONIC KIDNEY DISEASE (H): ICD-10-CM

## 2022-05-27 DIAGNOSIS — R73.03 PREDIABETES: Primary | ICD-10-CM

## 2022-05-27 DIAGNOSIS — E66.811 CLASS 1 OBESITY DUE TO EXCESS CALORIES WITH SERIOUS COMORBIDITY AND BODY MASS INDEX (BMI) OF 34.0 TO 34.9 IN ADULT: ICD-10-CM

## 2022-05-27 DIAGNOSIS — E66.01 MORBID OBESITY (H): ICD-10-CM

## 2022-05-27 DIAGNOSIS — E66.09 CLASS 1 OBESITY DUE TO EXCESS CALORIES WITH SERIOUS COMORBIDITY AND BODY MASS INDEX (BMI) OF 34.0 TO 34.9 IN ADULT: ICD-10-CM

## 2022-05-27 PROCEDURE — 99205 OFFICE O/P NEW HI 60 MIN: CPT | Mod: 95 | Performed by: PHYSICIAN ASSISTANT

## 2022-05-27 RX ORDER — METFORMIN HCL 500 MG
TABLET, EXTENDED RELEASE 24 HR ORAL
Qty: 90 TABLET | Refills: 1 | Status: SHIPPED | OUTPATIENT
Start: 2022-05-27 | End: 2022-08-01

## 2022-05-27 NOTE — PROGRESS NOTES
"Yrn is a 62 year old who is being evaluated via a billable video visit.      If the video visit is dropped, the invitation should be resent by: Text to cell phone: [phone]  Will anyone else be joining your video visit? No      Video-Visit Details    Type of service:  Video Visit    Video Start Time: 10:38 AM    Video End Time: 11:20 AM    Originating Location (pt. Location): Home    Distant Location (provider location):  [facility] [place]     Platform used for Video Visit: [facility] Consult    PATIENT:  Yrn Will  MRN:         [id number]  :         1960  VICTORIANO:         2022    Dear Enrico Reagan MD,      I had the pleasure of seeing your patient, Yrn Will. Full intake/assessment was done to determine barriers to weight loss success and develop a treatment plan. Yrn Will is a 62 year old male interested in treatment of medical problems associated with excess weight. He has a height of 5' 8.5\"[pt reproted[, a weight of 230 lbs 0 oz, and the calculated Body mass index is 34.46 kg/m .    ASSESSMENT & PLAN:    Problem List Items Addressed This Visit     Prediabetes - Primary     Reviewed pathophysiology of prediabetes and insulin resistance. Discussed diet, activity, and medication.  Will start metformin.            Relevant Medications    metFORMIN (GLUCOPHAGE XR) 500 MG 24 hr tablet    Other Relevant Orders    NUTRITION REFERRAL    Comprehensive metabolic panel    Hemoglobin A1c    CKD (chronic kidney disease) stage 3, GFR 30-59 ml/min (H)     Will check CMP in 6 wks after starting Metformin.           Class 1 obesity due to excess calories with serious comorbidity and body mass index (BMI) of 34.0 to 34.9 in adult     We discussed healthy habits to assist with weight loss. We discussed medication that may assist with weight loss. Metformin was prescribed. Risks/ benefits and possible side effects were discussed and " "questions were answered. Written information was given. Will check HgA1C and CMP in 6 wks.                Relevant Medications    metFORMIN (GLUCOPHAGE XR) 500 MG 24 hr tablet    Other Relevant Orders    NUTRITION REFERRAL    Comprehensive metabolic panel    Hemoglobin A1c    Morbid obesity (H)     We discussed healthy habits to assist with weight loss. We discussed medication that may assist with weight loss. Metformin was prescribed. Risks/ benefits and possible side effects were discussed and questions were answered. Written information was given. Goals: Will start having breakfast and protein and each meal.  Will focus on increasing activity outside over summer.                Relevant Medications    metFORMIN (GLUCOPHAGE XR) 500 MG 24 hr tablet           PROGRAM OVERVIEW  Reviewed options at [facility] including provider visits, dietician, 24 week healthy lifestyle program, health coaching, food supplements, Get Moving program, and psychological support.  All questions about weight loss program were answered.     MEDICATIONS:  We discussed healthy habits to assist with weight loss. We reviewed medications associated with weight gain. We discussed the role of pharmacological agents in the treatment of obesity and the \"off-label\" use of medications in this practice. We reviewed medication that may assist with weight loss. Indications, contraindications, risks/benefits, and potential side effects were discussed. Metformin was prescribed. Discussed that medications must always be used together with lifestyle changes such as improvements in diet choices, portion control and establishing and maintaining a regular exercise program.      CURRENT GOALS:   Start having breakfast in the morning.    Eat something with protein for each meal  Move more outside.  Enjoy the weather.    FOLLOW-UP:    Please call [phone] to schedule your next visit in 8 weeks.    71 minutes spent on the date of the encounter " "doing chart review, history and exam, review test results, counseling, developing plan of care, documentation, and further activities as noted above.    He has the following co-morbidities:       5/25/2022   I have the following health issues associated with obesity: Pre-Diabetes, High Blood Pressure, High Cholesterol, GERD (Reflux), Fatty Liver   I have the following symptoms associated with obesity: None of the above       Patient Goals 5/25/2022   I am interested in having a healthier weight to diminish current health problems: Yes   I am interested in having a healthier weight in order to prevent future health problems: Yes   I am interested in having a healthier weight in order to have a future surgery: No       Referring Provider 5/25/2022   Please name the provider who referred you to [facility].  If you do not know, please answer: \"I Don't Know\". dr jered galvez       Weight History 5/25/2022   How concerned are you about your weight? Somewhat Concerned   Would you describe your weight gain as gradual? Yes   I became overweight: As an Adult   The following factors have contributed to my weight gain:  Genetic (Runs in the Family)   I have tried the following methods to lose weight: Watching Portions or Calories, Exercise, Fasting   My lowest weight since age 16 was: 130   My highest weight since age 16 was: 240   The most weight I have ever lost was: (lbs) 10   I have the following family history of obesity/being overweight:  My father is overweight, One or more of my siblings are overweight, Many of my relatives are overweight   Has anyone in your family had weight loss surgery? No   How has your weight changed over the last year?  Gained   How many pounds? 20     Dad, brother also are obese and have central obesity.  In the last 10 years he gained the majority of his weight.  Had prostate cancer 3-4 years ago. Goals is to get back under 200 without surgery.    Diet Recall Review with Patient " 5/25/2022   Do you typically eat breakfast? No 8-9 am Retired as an .  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     Do you typically eat lunch? Yes 11-noon   If you do eat lunch, what types of food do you typically eat?  eggs, cereal, ensure drink, or  fruit, frozen burrito   Do you typically eat supper? Yes   If you do eat supper, what types of food do you typically eat? pizza, corn dogs, fish sticks, chicken patties, fruit, eggs, salads, hamburgers 6:00 PM    Do you typically eat snacks? Yes   If you do snack, what types of food do you typically eat? little bags of chips, granola bars, cookies, fruit      Popcorn or fruit after dinner   Do you like vegetables?  Yes   Do you drink water? Yes   How many glasses of juice do you drink in a typical day? 0   How many of glasses of milk do you drink in a typical day? 0   If you do drink milk, what type? Whole   How many 8 oz glasses of sugar containing drinks such as Josh-Aid/sweet tea do you drink in a day? 0   How many cans/bottles of sugar pop/soda/tea/sports drinks do you drink in a day? 0   How many cans/bottles of diet pop/soda/tea or sports drink do you drink in a day? 0   How often do you have a drink of alcohol? Monthly or Less   If you do drink, how many drinks might you have in a day? 1 or 2       Eating Habits 5/25/2022   Generally, my meals include foods like these: bread, pasta, rice, potatoes, corn, crackers, sweet dessert, pop, or juice. A Few Times a Week   Generally, my meals include foods like these: fried meats, brats, burgers, french fries, pizza, cheese, chips, or ice cream. A Few Times a Week   Eat fast food (like [employer], [employer], [employer]aco Bell). Less Than Weekly   Eat at a buffet or sit-down restaurant. Less Than Weekly   Eat most of my meals in front of the TV or computer. Almost Everyday   Often skip meals, eat at random times, have no regular eating times. A Few Times a Week   Rarely sit down for a meal but snack or graze throughout.  Less  Than Weekly   Eat extra snacks between meals. Less Than Weekly   Eat most of my food at the end of the day. A Few Times a Week   Eat in the middle of the night or wake up at night to eat. Never   Eat extra snacks to prevent or correct low blood sugar. Less Than Weekly   Eat to prevent acid reflux or stomach pain. Never   Worry about not having enough food to eat. Never   Have you been to the food shelf at least a few times this year? No   I eat when I am depressed. Never   I eat when I am stressed. Never   I eat when I am bored. Never   I eat when I am anxious. Never   I eat when I am happy or as a reward. Never   I feel hungry all the time even if I just have eaten. Less Than Weekly   Feeling full is important to me. Never   I finish all the food on my plate even if I am already full. Everyday   I can't resist eating delicious food or walk past the good food/smell. Never   I eat/snack without noticing that I am eating. Never   I eat when I am preparing the meal. Never   I eat more than usual when I see others eating. Never   I have trouble not eating sweets, ice cream, cookies, or chips if they are around the house. Never   I think about food all day. Less Than Weekly   What foods, if any, do you crave? Sweets/Candy/Chocolate   Butterscotch drops every couple of days when he feels shaky.  If he doesn;t eat enough he gets kind of shaky.       Amount of Food 5/25/2022   I make myself vomit what I have eaten or use laxatives to get rid of food. Never   I eat a large amount of food, like a loaf of bread, a box of cookies, a pint/quart of ice cream, all at once. Never   I eat a large amount of food even when I am not hungry. Never   I eat rapidly. Never   I eat alone because I feel embarrassed and do not want others to see how much I have eaten. Never   I eat until I am uncomfortably full. Never   I feel bad, disgusted, or guilty after I overeat. Never   I make myself vomit what I have eaten or use laxatives to get rid  of food. Never       Activity/Exercise History 5/25/2022   How much of a typical 12 hour day do you spend sitting? Most of the Day   How much of a typical 12 hour day do you spend lying down? Less Than Half the Day   How much of a typical day do you spend walking/standing? Less Than Half the Day   How many hours (not including work) do you spend on the TV/Video Games/Computer/Tablet/Phone? 4-5 Hours   How many times a week are you active for the purpose of exercise? Once a Week   What keeps you from being more active? Shortness of Breath   How many total minutes do you spend doing some activity for the purpose of exercising when you exercise? 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     PAST MEDICAL HISTORY:  Past Medical History:   Diagnosis Date     Esophageal reflux 9/16/2009     Gilbert's disease      Gout 9/16/2009     HTN (hypertension) 9/16/2009     Hyperlipidemia LDL goal <130 9/16/2009       Work/Social History Reviewed With Patient 5/25/2022   My employment status is: Retired   How much of your job is spent on the computer or phone? 50%   How many hours do you spend commuting to work daily?  1   What is your marital status? Single   If in a relationship, is your significant other overweight? N/A   Do you have children? Yes   If you have children, are they overweight? No   Who do you live with?  with my two college boys   Are they supportive of your health goals? Yes   Who does the food shopping?  me       Social History     Tobacco Use     Smoking status: Never Smoker     Smokeless tobacco: Never Used   Substance Use Topics     Alcohol use: No     Alcohol/week: 0.0 standard drinks     Drug use: No        Mental Health History Reviewed With Patient 5/25/2022   Have you ever been physically or sexually abused? No   If yes, would you like to talk to a counselor about the abuse? N/A   How often in the past 2 weeks have you felt little interest or pleasure in doing  things? Not at all   Over the past 2 weeks how often have you felt down, depressed, or hopeless? Not at all       Sleep History Reviewed With Patient 5/25/2022   How many hours do you sleep at night? 10  11 PM   Do you think that you snore loudly or has anybody ever heard you snore loudly (louder than talking or so loud it can be heard behind a shut door)? Yes     Has anyone seen or heard you stop breathing during your sleep? No, Told he stops breathing at night.    Do you often feel tired, fatigued, or sleepy during the day? No   Do you have a TV/Computer in your bedroom? No       MEDICATIONS:   Current Outpatient Medications   Medication Sig Dispense Refill     amLODIPine (NORVASC) 10 MG tablet Take 1 tablet (10 mg) by mouth daily 90 tablet 3     losartan (COZAAR) 25 MG tablet Take 1 tablet (25 mg) by mouth daily 90 tablet 3     metFORMIN (GLUCOPHAGE XR) 500 MG 24 hr tablet 1 tablet by mouth daily with meal for 1 week, then 2 tablets daily with meal,  then 3 tablets daily with meal. 90 tablet 1     omeprazole (PRILOSEC) 20 MG DR capsule Take 1 capsule (20 mg) by mouth daily as needed (reflux) 90 capsule 3     Sildenafil Citrate (REVATIO PO)        simvastatin (ZOCOR) 40 MG tablet TAKE ONE TABLET BY MOUTH AT BEDTIME 90 tablet 3       ALLERGIES:   No Known Allergies    ROS:  Cardiovascular  HTN:    yes  Gastrointestinal  GERD:   Yes, controlled if taking PPI.   Constipation:   yes  Liver Dz:   Yes, fatty liver seen on US.  H/O Gallbladder Dz: no  Psychiatric  Moods Stable:  yes  Kidney disease:  Yes, CKD III    LABS/RECORDS REVIEWED:  Hemoglobin A1C POCT   Date Value Ref Range Status   06/09/2020 5.8 (H) 0 - 5.6 % Final     Comment:     Normal <5.7% Prediabetes 5.7-6.4%  Diabetes 6.5% or higher - adopted from ADA   consensus guidelines.       Hemoglobin A1C   Date Value Ref Range Status   09/30/2021 5.8 (H) 0.0 - 5.6 % Final     Comment:     Normal <5.7%   Prediabetes 5.7-6.4%    Diabetes 6.5% or higher     Note:  Adopted from ADA consensus guidelines.     Sodium   Date Value Ref Range Status   09/30/2021 137 133 - 144 mmol/L Final   06/09/2020 138 133 - 144 mmol/L Final     Potassium   Date Value Ref Range Status   09/30/2021 3.8 3.4 - 5.3 mmol/L Final   06/09/2020 3.6 3.4 - 5.3 mmol/L Final     Chloride   Date Value Ref Range Status   09/30/2021 107 94 - 109 mmol/L Final   06/09/2020 109 94 - 109 mmol/L Final     Carbon Dioxide   Date Value Ref Range Status   06/09/2020 22 20 - 32 mmol/L Final     Carbon Dioxide (CO2)   Date Value Ref Range Status   09/30/2021 25 20 - 32 mmol/L Final     Anion Gap   Date Value Ref Range Status   09/30/2021 5 3 - 14 mmol/L Final   06/09/2020 7 3 - 14 mmol/L Final     Glucose   Date Value Ref Range Status   09/30/2021 122 (H) 70 - 99 mg/dL Final   06/09/2020 109 (H) 70 - 99 mg/dL Final     Urea Nitrogen   Date Value Ref Range Status   09/30/2021 15 7 - 30 mg/dL Final   06/09/2020 14 7 - 30 mg/dL Final     Creatinine   Date Value Ref Range Status   09/30/2021 1.46 (H) 0.66 - 1.25 mg/dL Final   06/09/2020 1.31 (H) 0.66 - 1.25 mg/dL Final     GFR Estimate   Date Value Ref Range Status   09/30/2021 50 (L) >60 mL/min/1.73m2 Final     Comment:     As of July 11, 2021, eGFR is calculated by the CKD-EPI creatinine equation, without race adjustment. eGFR can be influenced by muscle mass, exercise, and diet. The reported eGFR is an estimation only and is only applicable if the renal function is stable.   06/09/2020 58 (L) >60 mL/min/[1.73_m2] Final     Comment:     Non  GFR Calc  Starting 12/18/2018, serum creatinine based estimated GFR (eGFR) will be   calculated using the Chronic Kidney Disease Epidemiology Collaboration   (CKD-EPI) equation.       Calcium   Date Value Ref Range Status   09/30/2021 8.7 8.5 - 10.1 mg/dL Final   06/09/2020 8.4 (L) 8.5 - 10.1 mg/dL Final     Bilirubin Total   Date Value Ref Range Status   09/30/2021 2.2 (H) 0.2 - 1.3 mg/dL Final   06/09/2020 1.4 (H) 0.2  "- 1.3 mg/dL Final     Alkaline Phosphatase   Date Value Ref Range Status   09/30/2021 128 40 - 150 U/L Final   06/09/2020 126 40 - 150 U/L Final     ALT   Date Value Ref Range Status   09/30/2021 63 0 - 70 U/L Final   06/09/2020 56 0 - 70 U/L Final     AST   Date Value Ref Range Status   09/30/2021 39 0 - 45 U/L Final   06/09/2020 37 0 - 45 U/L Final     Cholesterol   Date Value Ref Range Status   09/30/2021 189 <200 mg/dL Final   06/09/2020 161 <200 mg/dL Final     HDL Cholesterol   Date Value Ref Range Status   06/09/2020 42 >39 mg/dL Final     Direct Measure HDL   Date Value Ref Range Status   09/30/2021 52 >=40 mg/dL Final     LDL Cholesterol Calculated   Date Value Ref Range Status   09/30/2021 114 (H) <=100 mg/dL Final   06/09/2020 92 <100 mg/dL Final     Comment:     Desirable:       <100 mg/dl     Triglycerides   Date Value Ref Range Status   09/30/2021 115 <150 mg/dL Final   06/09/2020 136 <150 mg/dL Final     WBC   Date Value Ref Range Status   06/10/2019 7.8 4.0 - 11.0 10e9/L Final     Hemoglobin   Date Value Ref Range Status   06/10/2019 17.2 13.3 - 17.7 g/dL Final     Hematocrit   Date Value Ref Range Status   06/10/2019 48.2 40.0 - 53.0 % Final     MCV   Date Value Ref Range Status   06/10/2019 94 78 - 100 fl Final     Platelet Count   Date Value Ref Range Status   06/10/2019 182 150 - 450 10e9/L Final         PHYSICAL EXAM:  Ht 5' 8.5\" (1.74 m)   Wt 230 lb (104.3 kg)   BMI 34.46 kg/m    GENERAL: Healthy, alert and no distress  EYES: Eyes grossly normal to inspection.  No discharge or erythema, or obvious scleral/conjunctival abnormalities.  RESP: No audible wheeze, cough, or visible cyanosis.  No visible retractions or increased work of breathing.    SKIN: Visible skin clear. No significant rash, abnormal pigmentation or lesions.  NEURO: Cranial nerves grossly intact.  Mentation and speech appropriate for age.  PSYCH: Mentation appears normal, affect normal/bright, judgement and insight intact, " normal speech and appearance well-groomed.    COUNSELING:   Reviewed obesity as a chronic disease and comprehensive management strategies.      We discussed Bariatric Basics including:  -eating 3 meals daily  -eating protein first  -eating slowly, chewing food well  -avoiding/limiting calorie containing beverages  -limiting carbohydrates and changing to whole grains  -limiting restaurant or cafeteria eating to twice a week or less    We discussed the importance of restorative sleep and stress management in maintaining a healthy weight.  We discussed insulin resistance and glycemic index as it relates to appetite and weight control.   We discussed the importance of physical activity including cardiovascular and strength training in maintaining a healthier weight and explored viable options.  Patient education of above written in AVS.      Sincerely,    Leonora Rollins PA-C

## 2022-05-27 NOTE — ASSESSMENT & PLAN NOTE
We discussed healthy habits to assist with weight loss. We discussed medication that may assist with weight loss. Metformin was prescribed. Risks/ benefits and possible side effects were discussed and questions were answered. Written information was given. Goals: Will start having breakfast and protein and each meal.  Will focus on increasing activity outside over summer.

## 2022-05-27 NOTE — ASSESSMENT & PLAN NOTE
We discussed healthy habits to assist with weight loss. We discussed medication that may assist with weight loss. Metformin was prescribed. Risks/ benefits and possible side effects were discussed and questions were answered. Written information was given. Will check HgA1C and CMP in 6 wks.

## 2022-05-27 NOTE — PROGRESS NOTES
Video-Visit Details    Type of service:  Video Visit    Video Start Time (time video started): 8:57    Video End Time (time video stopped): 9:31    Originating Location (pt. Location): Home    Distant Location (provider location):  [facility]     Mode of Communication:  Video Conference via [facility] WEIGHT LOSS INITIAL EVALUATION  DIAGNOSIS:  Class I Obesity    NUTRITION HISTORY:    Do you typically eat breakfast? No-since seeing provider-apple or other fruit   If you do eat breakfast, what types of food do you eat?    Do you typically eat lunch? Yes   If you do eat lunch, what types of food do you typically eat?  Scrambled Eggs with cheese or cereal, Ensure drink (~ 350 calories), fruit or frozen burrito @ 11:00-2:00   Do you typically eat supper? Yes   If you do eat supper, what types of food do you typically eat? Pizza or corn dogs or fish sticks, chicken patties or fruit, eggs, lettuce salads, hamburgers @ 4:00-8:00   Do you typically eat snacks? Yes   If you do snack, what types of food do you typically eat? little bags of chips, granola bars, cookies  2X per day afternoon/ after dinner   Do you like vegetables?  Yes   Do you drink water? Yes- 50-70 oz   How many glasses of juice do you drink in a typical day? 0   How many of glasses of milk do you drink in a typical day? 0   If you do drink milk, what type? Whole-only used for cereal   How many 8oz glasses of sugar containing drinks such as Josh-Aid/sweet tea do you drink in a day? 0   How many cans/bottles of sugar pop/soda/tea/sports drinks do you drink in a day? 0   How many cans/bottles of diet pop/soda/tea or sports drink do you drink in a day? 0   How often do you have a drink of alcohol? Monthly or Less-none per conversation with patient   If you do drink, how many drinks might you have in a day? 1 or 2     Dining out: 1-2X per month-Brown meal  Binge eating: no  Emotional eating: no  Night time  "eating: no  Exercise: nothing  Other: Patient shops and cooks for himself and his sons who are home from college. Using mainly convenience foods. This is his first weight loss program. He has lost ~10 lbs in the past few months on his own. He is a retired , but maintains rental properties. Quit drinking ETOH after his prostate cancer dx.    MEDICATION FOR WEIGHT LOSS:  Metformin    ANTHROPOMETRICS:  Height:68.5\"   Weight: 230 lb   BMI:  34.46 kg/m2  NUTRITION DIAGNOSIS:   Obese, class I related to excess energy intake as evidence by BMI of  34.46  NUTRITION INTERVENTIONS  Nutrition Prescription:  Recommend modified energy- nutrient intake  Implementation:  Nutrition Education (Content):    Discussed My Plate Guidelines    Determined possible options for exercise    Reviewed options for include more veggies in diet   Provided: My Plate Guidelines, Tips for Weight loss/ Maintenance      Provided  Nutrition Education (Application):     Patient to practice goals as stated below    Patient verbalizes understanding of weight loss by wanting to improve his health    Anticipate good compliance    Goals:  Eat breakfast daily or try a protein drink  Criteria for selecting a protein drink/8-12 ounces:  < 250 calories  15-30 grams protein  < 10 grams total fat  < 10 grams sugar  Eat 1 serving of veggies per day (add to eggs, a side dish with dinner, lettuce or spinach salad, raw with light dressing)   Exercise 1X per week for 30 minutes (explore video options)    FOLLOW UP AND MONITORING:    Other  - follow up in 4-6 weeks.     TIME SPENT WITH PATIENT:  33 minutes   Carlton Coates RD, NICK  [facility], [place]  "

## 2022-05-27 NOTE — ASSESSMENT & PLAN NOTE
Reviewed pathophysiology of prediabetes and insulin resistance. Discussed diet, activity, and medication.  Will start metformin.

## 2022-05-27 NOTE — PATIENT INSTRUCTIONS
Nice to talk with you today.  Below is our plan we discussed.-  REAL Lea    Plan:    Start metformin.  1 tablet at dinner for week 1.  Then 2 tablets for week 2.  Then 3 tablet at week 3 and on.     Labs have been ordered for you. You can have these drawn at any [facility] lab.  Please call [phone] to set up a lab appt. Results will post to [facility] when complete.  Once all completed, I will review, and write you a note explaining what we find.      Goals:  Start having breakfast in the morning.    Eat something with protein for each meal  Move more outside.  Enjoy the weather.    FOLLOW-UP:    Please call [phone] to schedule your next visit in 8 weeks.    MEDICATION STARTED AT THIS APPOINTMENT    We are starting metformin.  Starting instructions:    Immediate release tablet: Take 1 tablet by mouth daily with a meal for 1 week, then increase to 1 tablet twice daily with meals.   Extended release tablet: Take 1 tablet by mouth daily with a meal for 1 week, then increase to 2 tablets daily with a meal or 1 tablet twice daily with meals.      Contact the nurse via [facility] or call [phone] if you have any questions or concerns    Metformin is a medication that is used to assist with lowering blood sugars in patients that have Pre-Diabetes or Diabetes. It has also been found to help with weight loss.    Metformin helps to lower blood sugars by lowering the amount of sugar (glucose) your liver produces that would otherwise cause your blood sugar to increase. It also makes your body respond better to insulin (the product that lowers your blood sugar). It is unclear how metformin assists with weight loss.     Side-effects. Metformin is generally well tolerated. The main side-effects we see are diarrhea, nausea and stomach upset - this tends to subside over time as your body gets used to the medication. Taking this medications with food will lower these side effects.    Low blood sugar (hypoglycemia) is rare  to occur with metformin, but can occur if you do not eat enough or are taking other medications that assist to lower blood sugar. The signs of low blood sugar are:  Weakness  Shaky   Hungry  Sweating  Confusion    See below for ways to treat low blood sugar without adding in lots of extra calories.    Treating Low Blood Sugar    If you have symptoms of low blood sugar (sweating, shaking, dizzy, confused) eat 15 grams of carbs and wait 15 minutes:    Glucose Tabs are best for sugars under 70 -  Dex4 or BD Glucose tablets are good, you will need to take 3-4 of these to equal 15 grams.     One small box of raisins  4 oz fruit juice box or   cup fruit juice  1 small apple  1 small banana    cup canned fruit in water    English muffin or a slice of bread with jelly   1 low fat frozen waffle with sugar-free syrup    cup cottage cheese with   cup frozen or fresh blueberries  1 cup skim or low-fat milk    cup whole grain cereal  4-6 crackers such as Triscuits    Please refer to the pharmacy insert for more information on side-effects. Please call the clinic should you have more questions regarding this medication.      In order to get refills of this or any medication we prescribe you must be seen in the medical weight mgmt clinic every 2-3 months.  10 Healthy Habits  Eat Better ? Move More ? Live Well   Eat breakfast daily.     Try to eat within the first hour after you wake up. This helps you control your appetite during the day, and you are less likely to snack in the evening.    80% of people who skip meals are overweight or obese.    2.   Include protein with every meal, even breakfast.    Protein will suppress your appetite longer than other types of food. This helps you  feel more satisfied with the amount of food you have eaten.    3.  Fill up on Fiber   Fiber comes from plants--fruits, veggies, whole grains, nuts/seeds and beans   Fiber is low in calories, high in phytonutrients and helps you stay full longer    4.   Eat S-L-O-W-L-Y   Take 20-30 minutes to eat each meal by taking small bites, chewing foods to applesauce consistency or 20-30 times before you swallow   Eating foods too fast can delay satiety/fullness signals and increase overeating     5.  Avoid Mindless Eating   Be present when you eat--take note of the smell, taste and quality of your food   Make a list of alternative activities you could do to prevent boredom/stress eating  Go for a walk, call a friend, chew gum, paint your nails    6.  Keep a Journal   Writing down what you eat, how you feel and when you are active helps you identify new changes to work on from week to week         Look for ways to cut 100 calories from your current diet 2-3 times per day    7.  Drink 64 ounces of Non Calorie drinks between meals   Water   Zero calorie Propel , Vitamin Water , Crystal Light    Avoid regular soda pop    8.  Stop thinking about food choices as a  diet.    Instead, think of them as healthy, lifelong habits--an effort toward a new way of eating.   Weigh yourself once a week instead of everyday.     9.  Get enough sleep--at least 7 to 8 hours each night.    Lack of sleep is one reason that fat builds up around the waist.    10.  Exercise five to six times per week    Do a combination aerobic exercise (fast walking, biking, swimming) and strength training (Dumbbells, pushups, weight machines, resistance bands).   Start at 10 minutes per day and slowly work your way up to 30 minutes or more.  Taking the stairs instead of the elevator, park at the far end of the parking lot, pace while on the phone, take the dog for a walk.     [email or web address]

## 2022-05-31 ENCOUNTER — VIRTUAL VISIT (OUTPATIENT)
Dept: SURGERY | Facility: CLINIC | Age: 62
End: 2022-05-31
Payer: COMMERCIAL

## 2022-05-31 PROCEDURE — 97802 MEDICAL NUTRITION INDIV IN: CPT | Mod: 95 | Performed by: DIETITIAN, REGISTERED

## 2022-05-31 NOTE — PATIENT INSTRUCTIONS
Marlon Pool-  Welcome to the [facility], [place]! It was great to visit with you and learn about  your interest in weight loss. Below are the goals we discussed.  Goals:  Eat breakfast daily or try a protein drink  Criteria for selecting a protein drink/8-12 ounces:  < 250 calories  15-30 grams protein  < 10 grams total fat  < 10 grams sugar  Eat 1 serving of veggies per day (add to eggs, a side dish with dinner, lettuce or spinach salad, raw with light dressing)   Exercise 1X per week for 30 minutes (explore video options)    Nutrition Educational Materials:    Tips for Weight Loss and Weight Management  [email or web address]    Create a Plate (How to Build a Healthy Meal)  [email or web address]      Please call [phone] to schedule your next visit with a Dietitian in 4-6 weeks  Thanks!  Carlton Coates RD, LD  [facility], [place]

## 2022-07-13 ENCOUNTER — OFFICE VISIT (OUTPATIENT)
Dept: URGENT CARE | Facility: URGENT CARE | Age: 62
End: 2022-07-13
Payer: COMMERCIAL

## 2022-07-13 VITALS
OXYGEN SATURATION: 99 % | DIASTOLIC BLOOD PRESSURE: 74 MMHG | TEMPERATURE: 98.1 F | SYSTOLIC BLOOD PRESSURE: 126 MMHG | HEART RATE: 77 BPM

## 2022-07-13 DIAGNOSIS — L08.1 ERYTHRASMA: Primary | ICD-10-CM

## 2022-07-13 PROCEDURE — 99213 OFFICE O/P EST LOW 20 MIN: CPT | Performed by: PHYSICIAN ASSISTANT

## 2022-07-13 RX ORDER — CLINDAMYCIN PHOSPHATE 10 MG/G
GEL TOPICAL 2 TIMES DAILY
Qty: 60 G | Refills: 1 | Status: SHIPPED | OUTPATIENT
Start: 2022-07-13 | End: 2022-07-27

## 2022-07-13 NOTE — PROGRESS NOTES
Assessment & Plan     1. Erythrasma  Patient has been using antifungal cream for the past 6 weeks and has not seen improvement in symptoms.   I suspect he has intertriginous erythrasma  Will treat with clindamycin for 2-3 weeks  OK to continue with antifungals, but he has not seen improvement with this medication   Try to air out groin during the day and wear breathable clothing  - clindamycin (CLINDAMAX) 1 % external gel; Apply topically 2 times daily for 14 days  Dispense: 60 g; Refill: 1        Return in about 1 week (around 7/20/2022), or if symptoms worsen or fail to improve.    Diagnosis and treatment plan was reviewed with patient and/or family.   We went over any labs or imaging. Discussed worsening symptoms or little to no relief despite treatment plan to follow-up with PCP or return to clinic.  Patient verbalizes understanding. All questions were addressed and answered.     Nelli Bennett PA-C  [facility]    CHIEF COMPLAINT:   Chief Complaint   Patient presents with     Urgent Care     Rash in groin area x 6 weeks     Subjective     Yrn is a 62 year old male who presents to clinic today for evaluation of groin rash.  Patient first noticed the rash 6 weeks ago.  He was cleaning out an apartment building, and was very sweaty.  Rash showed up shortly after that.  He has been using clotrimazole and terbinafine on the area twice daily without improvement.  Rash seems to be worsening.  Rashes at times itchy.  Additionally, he is simply incontinent to urine after his prostate surgery, so has been wearing briefs.  No drainage from the area.  No fever or chills.      Past Medical History:   Diagnosis Date     Esophageal reflux 9/16/2009     Gilbert's disease      Gout 9/16/2009     HTN (hypertension) 9/16/2009     Hyperlipidemia LDL goal <130 9/16/2009     Past Surgical History:   Procedure Laterality Date     BIOPSY PROSTATE TRANSRECTAL  2017    bx showed BPH, negative for prostate  cancer     DAVINCI PROSTATECTOMY  02/2018     LASIK  2006     repair right hydronephrosis  Age 14     Social History     Tobacco Use     Smoking status: Never Smoker     Smokeless tobacco: Never Used   Substance Use Topics     Alcohol use: No     Alcohol/week: 0.0 standard drinks     Current Outpatient Medications   Medication     amLODIPine (NORVASC) 10 MG tablet     clindamycin (CLINDAMAX) 1 % external gel     losartan (COZAAR) 25 MG tablet     metFORMIN (GLUCOPHAGE XR) 500 MG 24 hr tablet     omeprazole (PRILOSEC) 20 MG DR capsule     Sildenafil Citrate (REVATIO PO)     simvastatin (ZOCOR) 40 MG tablet     No current facility-administered medications for this visit.     No Known Allergies    10 point ROS of systems were all negative except for pertinent positives noted in my HPI.      Exam:   /74   Pulse 77   Temp 98.1  F (36.7  C) (Temporal)   SpO2 99%   Constitutional: healthy, alert and no distress  Head: Normocephalic, atraumatic.  Eyes: conjunctiva clear, no drainage  Skin: Erythematous patches on the groin bilaterally and scrotum. Slightly brown on the borders of groin.   Neurologic: Speech clear, gait normal. Moves all extremities.    No results found for any visits on 07/13/22.

## 2022-07-13 NOTE — PATIENT INSTRUCTIONS
Start Clindamycin gel two times per day for 2-3 weeks  OK to continue with antifungal cream  You should see improvement in one week  Try to air the groin out   If your symptoms do improve in one week, please follow-up with PCP

## 2022-07-13 NOTE — PROGRESS NOTES
Assessment & Plan     1. Erythrasma  ***  - clindamycin (CLINDAMAX) 1 % external gel; Apply topically 2 times daily for 14 days  Dispense: 60 g; Refill: 1        No follow-ups on file.    Diagnosis and treatment plan was reviewed with patient and/or family.   We went over any labs or imaging. Discussed worsening symptoms or little to no relief despite treatment plan to follow-up with PCP or return to clinic.  Patient verbalizes understanding. All questions were addressed and answered.     Nelli Bennett PA-C  [facility]    CHIEF COMPLAINT:   Chief Complaint   Patient presents with     Urgent Care     Rash in groin area x 6 weeks     Subjective     Yrn is a 62 year old male who presents to clinic today for evaluation ***      Past Medical History:   Diagnosis Date     Esophageal reflux 9/16/2009     Gilbert's disease      Gout 9/16/2009     HTN (hypertension) 9/16/2009     Hyperlipidemia LDL goal <130 9/16/2009     Past Surgical History:   Procedure Laterality Date     BIOPSY PROSTATE TRANSRECTAL  2017    bx showed BPH, negative for prostate cancer     DAVINCI PROSTATECTOMY  02/2018     LASIK  2006     repair right hydronephrosis  Age 14     Social History     Tobacco Use     Smoking status: Never Smoker     Smokeless tobacco: Never Used   Substance Use Topics     Alcohol use: No     Alcohol/week: 0.0 standard drinks     Current Outpatient Medications   Medication     amLODIPine (NORVASC) 10 MG tablet     clindamycin (CLINDAMAX) 1 % external gel     losartan (COZAAR) 25 MG tablet     metFORMIN (GLUCOPHAGE XR) 500 MG 24 hr tablet     omeprazole (PRILOSEC) 20 MG DR capsule     Sildenafil Citrate (REVATIO PO)     simvastatin (ZOCOR) 40 MG tablet     No current facility-administered medications for this visit.     No Known Allergies    10 point ROS of systems were all negative except for pertinent positives noted in my HPI.      Exam: ***  /74   Pulse 77   Temp 98.1  F (36.7  C)  (Temporal)   SpO2 99%   Constitutional: healthy, alert and no distress  Head: Normocephalic, atraumatic.  Eyes: conjunctiva clear, no drainage  ENT: TMs clear and shiny rufina, nasal mucosa pink and moist, throat without tonsillar hypertrophy or erythema  Neck: neck is supple, no cervical lymphadenopathy or nuchal rigidity  Cardiovascular: RRR  Respiratory: CTA bilaterally, no rhonchi or rales  Gastrointestinal: soft and nontender  Skin: no rashes  Neurologic: Speech clear, gait normal. Moves all extremities.    No results found for any visits on 07/13/22.

## 2022-07-27 ENCOUNTER — MYC MEDICAL ADVICE (OUTPATIENT)
Dept: PEDIATRICS | Facility: CLINIC | Age: 62
End: 2022-07-27

## 2022-07-27 ENCOUNTER — LAB (OUTPATIENT)
Dept: LAB | Facility: CLINIC | Age: 62
End: 2022-07-27
Payer: COMMERCIAL

## 2022-07-27 DIAGNOSIS — Z11.4 SCREENING FOR HIV (HUMAN IMMUNODEFICIENCY VIRUS): ICD-10-CM

## 2022-07-27 DIAGNOSIS — E66.811 CLASS 1 OBESITY DUE TO EXCESS CALORIES WITH SERIOUS COMORBIDITY AND BODY MASS INDEX (BMI) OF 34.0 TO 34.9 IN ADULT: ICD-10-CM

## 2022-07-27 DIAGNOSIS — L30.9 DERMATITIS: Primary | ICD-10-CM

## 2022-07-27 DIAGNOSIS — E66.09 CLASS 1 OBESITY DUE TO EXCESS CALORIES WITH SERIOUS COMORBIDITY AND BODY MASS INDEX (BMI) OF 34.0 TO 34.9 IN ADULT: ICD-10-CM

## 2022-07-27 DIAGNOSIS — N18.30 CKD (CHRONIC KIDNEY DISEASE) STAGE 3, GFR 30-59 ML/MIN (H): Primary | ICD-10-CM

## 2022-07-27 DIAGNOSIS — R73.03 PREDIABETES: ICD-10-CM

## 2022-07-27 LAB
ALBUMIN SERPL-MCNC: 3.9 G/DL (ref 3.4–5)
ALP SERPL-CCNC: 98 U/L (ref 40–150)
ALT SERPL W P-5'-P-CCNC: 55 U/L (ref 0–70)
ANION GAP SERPL CALCULATED.3IONS-SCNC: 5 MMOL/L (ref 3–14)
AST SERPL W P-5'-P-CCNC: 36 U/L (ref 0–45)
BILIRUB SERPL-MCNC: 1.9 MG/DL (ref 0.2–1.3)
BUN SERPL-MCNC: 14 MG/DL (ref 7–30)
CALCIUM SERPL-MCNC: 9.1 MG/DL (ref 8.5–10.1)
CHLORIDE BLD-SCNC: 107 MMOL/L (ref 94–109)
CO2 SERPL-SCNC: 26 MMOL/L (ref 20–32)
CREAT SERPL-MCNC: 1.33 MG/DL (ref 0.66–1.25)
CREAT UR-MCNC: 280 MG/DL
GFR SERPL CREATININE-BSD FRML MDRD: 60 ML/MIN/1.73M2
GLUCOSE BLD-MCNC: 116 MG/DL (ref 70–99)
HBA1C MFR BLD: 5.7 % (ref 0–5.6)
HGB BLD-MCNC: 16.5 G/DL (ref 13.3–17.7)
MICROALBUMIN UR-MCNC: 46 MG/L
MICROALBUMIN/CREAT UR: 16.43 MG/G CR (ref 0–17)
POTASSIUM BLD-SCNC: 4.3 MMOL/L (ref 3.4–5.3)
PROT SERPL-MCNC: 7.4 G/DL (ref 6.8–8.8)
SODIUM SERPL-SCNC: 138 MMOL/L (ref 133–144)

## 2022-07-27 PROCEDURE — 87389 HIV-1 AG W/HIV-1&-2 AB AG IA: CPT

## 2022-07-27 PROCEDURE — 85018 HEMOGLOBIN: CPT

## 2022-07-27 PROCEDURE — 83036 HEMOGLOBIN GLYCOSYLATED A1C: CPT

## 2022-07-27 PROCEDURE — 36415 COLL VENOUS BLD VENIPUNCTURE: CPT

## 2022-07-27 PROCEDURE — 82043 UR ALBUMIN QUANTITATIVE: CPT

## 2022-07-27 PROCEDURE — 80053 COMPREHEN METABOLIC PANEL: CPT

## 2022-07-28 LAB — HIV 1+2 AB+HIV1 P24 AG SERPL QL IA: NONREACTIVE

## 2022-07-28 NOTE — TELEPHONE ENCOUNTER
Dr. Reagan,    Please see  message and advise  In person visit?     visit 7/13/22 Notes  1. Erythrasma  Patient has been using antifungal cream for the past 6 weeks and has not seen improvement in symptoms.   I suspect he has intertriginous erythrasma  Will treat with clindamycin for 2-3 weeks  OK to continue with antifungals, but he has not seen improvement with this medication   Try to air out groin during the day and wear breathable clothing  - clindamycin (CLINDAMAX) 1 % external gel; Apply topically 2 times daily for 14 days  Dispense: 60 g; Refill: 1     Return in about 1 week (around 7/20/2022), or if symptoms worsen or fail to improve.     Thank you  Christopher Garcia RN on 7/28/2022 at 8:30 AM

## 2022-07-28 NOTE — TELEPHONE ENCOUNTER
Please call [facility]--can we get Prasanna a visit there in the next week or 2?  ([place] office or other)    [facility]   [address]   [place] [address]   Phone: [phone]

## 2022-07-29 ENCOUNTER — TRANSFERRED RECORDS (OUTPATIENT)
Dept: PEDIATRICS | Facility: CLINIC | Age: 62
End: 2022-07-29

## 2022-07-29 NOTE — TELEPHONE ENCOUNTER
Call placed to [facility]  They have an appt available today at 2 pm  They will hold the spot  We will need to call [facility] to confirm the appt when we hear back from pt    Sent MC message to the pt rosanna goel the details    Thank you  Christopher Garcia RN on 7/29/2022 at 9:03 AM

## 2022-07-29 NOTE — TELEPHONE ENCOUNTER
Pt returned call. He is able to make it to the appt today at 2 pm with [facility]    Call placed to [facility] to confirm the appt    Call placed to pt to let him know to call [facility] to get registered  Pt verbalized understanding and agrees to the plan    Thank you  Christopher Garcia RN on 7/29/2022 at 9:49 AM

## 2022-08-01 ENCOUNTER — MYC MEDICAL ADVICE (OUTPATIENT)
Dept: SURGERY | Facility: CLINIC | Age: 62
End: 2022-08-01

## 2022-08-26 NOTE — PROGRESS NOTES
Yrn is a 62 year old who is being evaluated via a billable telephone visit.      If the telephone visit is dropped, the invitation should be resent by: Text to cell phone: [phone]  Will anyone else be joining your telephone visit? No      Telephone-Visit Details    Type of service: Telephone    Telephone Start Time: 8:33 AM    Telephone End Time:9:02 AM      Originating Location (pt. Location): Home    Distant Location (provider location):  [facility] [place]     Platform used for Video Visit: [employer]      2022      Return Medical Weight Management Note     Yrn Will  MRN:  [id number]  :  1960    Dear Enrico Reagan MD,    I had the pleasure of seeing your patient Yrn Will. He is a 62 year old male who I am continuing to see for treatment of obesity related to:       2022   I have the following health issues associated with obesity: Pre-Diabetes, High Blood Pressure, High Cholesterol, GERD (Reflux), Fatty Liver   I have the following symptoms associated with obesity: None of the above       Assessment & Plan   Problem List Items Addressed This Visit     Prediabetes     Increase metformin to 4 tablets daily.  HgA1C and CMP ordered to be drawn in 8-12 wks.            Relevant Medications    metFORMIN (GLUCOPHAGE XR) 500 MG 24 hr tablet    CKD (chronic kidney disease) stage 3, GFR 30-59 ml/min (H)     CMP ordered for 8-12 wks.            Class 1 obesity due to excess calories with serious comorbidity and body mass index (BMI) of 34.0 to 34.9 in adult - Primary     Patient was congratulated on wt loss success thus far. Healthy habits to assist with further weight loss were discussed. Yrn will continue the metformin and increase to 4 tablets daily.              Relevant Medications    metFORMIN (GLUCOPHAGE XR) 500 MG 24 hr tablet    Morbid obesity (H)    Relevant Medications    metFORMIN (GLUCOPHAGE XR) 500 MG 24 hr tablet           PATIENT  INSTRUCTIONS:  Increase Metformin to 4 tablets daily  Labs have been ordered for you.  Please call [phone] to set up a lab appt before your next appt.     Goals:  Start having something with protein at each meal (will send you some coupons for ensure Max protein)  Buy Whole Grain Bread  Eat something about every 4 hours    FOLLOW-UP:    Please call [phone] to schedule your next visit in 8-12 week with Leonora Rollins PA-C  Schedule with the dietician in 1 month    37 minutes spent on the date of the encounter doing chart review, history and exam, result review, counseling, developing plan of care, documentation, and further activities as noted      INTERVAL HISTORY:  Yrn returns for medical weight management follow up.  Last seen in May for Initial MWL. Started on Metformin.    Goals:  Start having breakfast in the morning.  -met  Eat something with protein for each meal- not met  Move more outside.  Enjoy the weather.- not met    Diet goals:  Try a protein drink- not met  Criteria for selecting a protein drink/8-12 ounces:  < 250 calories  15-30 grams protein  < 10 grams total fat  < 10 grams sugar  Eat 1 serving of veggies per day (add to eggs, a side dish with dinner, lettuce or spinach salad, raw with light dressing) - not met  Exercise 1X per week for 30 minutes (explore video options)- not met      WEIGHT METRICS:  Body mass index is 34.67 kg/m .   Current Weight: 228 lb (103.4 kg)  Last Visits Weight: 230 lb (104.3 kg)  Initial Weight (lbs): 230 lbs  Cumulative weight loss (lbs): 2  Weight Loss Percentage: 0.87%    Wt Readings from Last 10 Encounters:   09/02/22 228 lb (103.4 kg)   05/27/22 230 lb (104.3 kg)   04/16/22 236 lb (107 kg)   09/30/21 236 lb 9.6 oz (107.3 kg)   06/14/21 235 lb (106.6 kg)   08/03/20 235 lb 11.2 oz (106.9 kg)   07/29/20 233 lb (105.7 kg)   07/23/20 237 lb (107.5 kg)   09/20/19 225 lb (102.1 kg)   08/07/19 225 lb (102.1 kg)      Diet  Wake up 8 am  9 am Banana  2-3 PM  Chicken [place] Frozen candie  6-7 Snack Fruit     Weight Loss Medication History Reviewed With Patient 9/2/2022   Which weight loss medications are you currently taking on a regular basis?  Metformin   Are you having any side effects from the weight loss medication that we have prescribed you? 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      BP Readings from Last 6 Encounters:   07/13/22 126/74   04/16/22 (!) 154/87   09/30/21 128/72   06/14/21 (!) 132/92   08/03/20 122/74   07/29/20 130/64     Pulse Readings from Last 6 Encounters:   07/13/22 77   04/16/22 82   09/30/21 74   06/14/21 84   08/03/20 82   07/29/20 86       MEDICATIONS:   Current Outpatient Medications   Medication Sig Dispense Refill     amLODIPine (NORVASC) 10 MG tablet Take 1 tablet (10 mg) by mouth daily 90 tablet 3     losartan (COZAAR) 25 MG tablet Take 1 tablet (25 mg) by mouth daily 90 tablet 3     metFORMIN (GLUCOPHAGE XR) 500 MG 24 hr tablet TAKE 4 TABLETS BY MOUTH EVERY  tablet 1     omeprazole (PRILOSEC) 20 MG DR capsule Take 1 capsule (20 mg) by mouth daily as needed (reflux) 90 capsule 3     Sildenafil Citrate (REVATIO PO)        simvastatin (ZOCOR) 40 MG tablet TAKE ONE TABLET BY MOUTH AT BEDTIME 90 tablet 3       LABS:  Hemoglobin A1C   Date Value Ref Range Status   07/27/2022 5.7 (H) 0.0 - 5.6 % Final     Comment:     Normal <5.7%   Prediabetes 5.7-6.4%    Diabetes 6.5% or higher     Note: Adopted from ADA consensus guidelines.   06/09/2020 5.8 (H) 0 - 5.6 % Final     Comment:     Normal <5.7% Prediabetes 5.7-6.4%  Diabetes  6.5% or higher - adopted from ADA   consensus guidelines.       Sodium   Date Value Ref Range Status   07/27/2022 138 133 - 144 mmol/L Final   06/09/2020 138 133 - 144 mmol/L Final     Potassium   Date Value Ref Range Status   07/27/2022 4.3 3.4 - 5.3 mmol/L Final   06/09/2020 3.6 3.4 - 5.3 mmol/L Final     Chloride   Date Value Ref Range Status   07/27/2022 107 94 - 109 mmol/L Final   06/09/2020 109 94 - 109 mmol/L Final     Carbon Dioxide   Date Value Ref Range Status   06/09/2020 22 20 - 32 mmol/L Final     Carbon Dioxide (CO2)   Date Value Ref Range Status   07/27/2022 26 20 - 32 mmol/L Final     Anion Gap   Date Value Ref Range Status   07/27/2022 5 3 - 14 mmol/L Final   06/09/2020 7 3 - 14 mmol/L Final     Glucose   Date Value Ref Range Status   07/27/2022 116 (H) 70 - 99 mg/dL Final   06/09/2020 109 (H) 70 - 99 mg/dL Final     Urea Nitrogen   Date Value Ref Range Status   07/27/2022 14 7 - 30 mg/dL Final   06/09/2020 14 7 - 30 mg/dL Final     Creatinine   Date Value Ref Range Status   07/27/2022 1.33 (H) 0.66 - 1.25 mg/dL Final   06/09/2020 1.31 (H) 0.66 - 1.25 mg/dL Final     GFR Estimate   Date Value Ref Range Status   07/27/2022 60 (L) >60 mL/min/1.73m2 Final     Comment:     Effective December 21, 2021 eGFRcr in adults is calculated using the 2021 CKD-EPI creatinine equation which includes age and gender (Tiffanie et al., NEJM, DOI: 10.1056/SYVPnn8027758)   06/09/2020 58 (L) >60 mL/min/[1.73_m2] Final     Comment:     Non  GFR Calc  Starting 12/18/2018, serum creatinine based estimated GFR (eGFR) will be   calculated using the Chronic Kidney Disease Epidemiology Collaboration   (CKD-EPI) equation.       Calcium   Date Value Ref Range Status   07/27/2022 9.1 8.5 - 10.1 mg/dL Final   06/09/2020 8.4 (L) 8.5 - 10.1 mg/dL Final     Bilirubin Total   Date Value Ref Range Status   07/27/2022 1.9 (H) 0.2 - 1.3 mg/dL Final   06/09/2020 1.4 (H) 0.2 - 1.3 mg/dL Final     Alkaline Phosphatase   Date  "Value Ref Range Status   07/27/2022 98 40 - 150 U/L Final   06/09/2020 126 40 - 150 U/L Final     ALT   Date Value Ref Range Status   07/27/2022 55 0 - 70 U/L Final   06/09/2020 56 0 - 70 U/L Final     AST   Date Value Ref Range Status   07/27/2022 36 0 - 45 U/L Final   06/09/2020 37 0 - 45 U/L Final     Cholesterol   Date Value Ref Range Status   09/30/2021 189 <200 mg/dL Final   06/09/2020 161 <200 mg/dL Final     HDL Cholesterol   Date Value Ref Range Status   06/09/2020 42 >39 mg/dL Final     Direct Measure HDL   Date Value Ref Range Status   09/30/2021 52 >=40 mg/dL Final     LDL Cholesterol Calculated   Date Value Ref Range Status   09/30/2021 114 (H) <=100 mg/dL Final   06/09/2020 92 <100 mg/dL Final     Comment:     Desirable:       <100 mg/dl     Triglycerides   Date Value Ref Range Status   09/30/2021 115 <150 mg/dL Final   06/09/2020 136 <150 mg/dL Final     WBC   Date Value Ref Range Status   06/10/2019 7.8 4.0 - 11.0 10e9/L Final     Hemoglobin   Date Value Ref Range Status   07/27/2022 16.5 13.3 - 17.7 g/dL Final   06/10/2019 17.2 13.3 - 17.7 g/dL Final     Hematocrit   Date Value Ref Range Status   06/10/2019 48.2 40.0 - 53.0 % Final     MCV   Date Value Ref Range Status   06/10/2019 94 78 - 100 fl Final     Platelet Count   Date Value Ref Range Status   06/10/2019 182 150 - 450 10e9/L Final         PE:  Ht 5' 8\" (1.727 m)   Wt 228 lb (103.4 kg)   BMI 34.67 kg/m    GENERAL: Healthy, alert and no distress  EYES: Eyes grossly normal to inspection.  No discharge or erythema, or obvious scleral/conjunctival abnormalities.  RESP: No audible wheeze, cough, or visible cyanosis.  No visible retractions or increased work of breathing.    SKIN: Visible skin clear. No significant rash, abnormal pigmentation or lesions.  NEURO: Cranial nerves grossly intact.  Mentation and speech appropriate for age.  PSYCH: Mentation appears normal, affect normal/bright, judgement and insight intact, normal speech and " appearance well-groomed.      Sincerely,      Leonora Rollins PA-C

## 2022-09-02 ENCOUNTER — VIRTUAL VISIT (OUTPATIENT)
Dept: SURGERY | Facility: CLINIC | Age: 62
End: 2022-09-02
Payer: COMMERCIAL

## 2022-09-02 VITALS — HEIGHT: 68 IN | BODY MASS INDEX: 34.56 KG/M2 | WEIGHT: 228 LBS

## 2022-09-02 DIAGNOSIS — N18.31 STAGE 3A CHRONIC KIDNEY DISEASE (H): ICD-10-CM

## 2022-09-02 DIAGNOSIS — E66.09 CLASS 1 OBESITY DUE TO EXCESS CALORIES WITH SERIOUS COMORBIDITY AND BODY MASS INDEX (BMI) OF 34.0 TO 34.9 IN ADULT: Primary | ICD-10-CM

## 2022-09-02 DIAGNOSIS — E66.811 CLASS 1 OBESITY DUE TO EXCESS CALORIES WITH SERIOUS COMORBIDITY AND BODY MASS INDEX (BMI) OF 34.0 TO 34.9 IN ADULT: Primary | ICD-10-CM

## 2022-09-02 DIAGNOSIS — E66.01 MORBID OBESITY (H): ICD-10-CM

## 2022-09-02 DIAGNOSIS — R73.03 PREDIABETES: ICD-10-CM

## 2022-09-02 PROCEDURE — 99214 OFFICE O/P EST MOD 30 MIN: CPT | Mod: 95 | Performed by: PHYSICIAN ASSISTANT

## 2022-09-02 RX ORDER — METFORMIN HCL 500 MG
TABLET, EXTENDED RELEASE 24 HR ORAL
Qty: 360 TABLET | Refills: 1 | Status: SHIPPED | OUTPATIENT
Start: 2022-09-02 | End: 2022-11-22

## 2022-09-02 NOTE — PATIENT INSTRUCTIONS
"Nice to talk with you today. Thank you for allowing me the privilege of caring for you. We hope we provided you with the excellent service you deserve.     To ensure the quality of our services you may receive a patient satisfaction survey from an independent monitoring company.  The greatest compliment you can give is \"Likely to Recommend\"    Below is our plan we discussed.-  REAL Lea      Plan:  Increase Metformin to 4 tablets daily  Labs have been ordered for you.  Please call [phone] to set up a lab appt before your next appt.     Goals:  Start having something with protein at each meal (will send you some coupons for ensure Max protein)  Buy Whole Grain Bread  Eat something about every 4 hours    FOLLOW-UP:    Please call [phone] to schedule your next visit in 8-12 week with Leonora Rollins PA-C  Schedule with the dietician in 1 month    "

## 2022-09-02 NOTE — ASSESSMENT & PLAN NOTE
Patient was congratulated on wt loss success thus far. Healthy habits to assist with further weight loss were discussed. Yrn will continue the metformin and increase to 4 tablets daily.

## 2022-09-04 DIAGNOSIS — K21.9 GASTROESOPHAGEAL REFLUX DISEASE WITHOUT ESOPHAGITIS: ICD-10-CM

## 2022-09-07 NOTE — TELEPHONE ENCOUNTER
Prescription approved per [facility] Refill Protocol.    Lula Hernandez RN

## 2022-09-26 DIAGNOSIS — I10 ESSENTIAL HYPERTENSION WITH GOAL BLOOD PRESSURE LESS THAN 140/90: ICD-10-CM

## 2022-09-26 DIAGNOSIS — E78.5 HYPERLIPIDEMIA LDL GOAL <130: ICD-10-CM

## 2022-09-27 RX ORDER — AMLODIPINE BESYLATE 10 MG/1
TABLET ORAL
Qty: 90 TABLET | Refills: 0 | Status: SHIPPED | OUTPATIENT
Start: 2022-09-27 | End: 2022-11-22

## 2022-09-27 RX ORDER — LOSARTAN POTASSIUM 25 MG/1
TABLET ORAL
Qty: 90 TABLET | Refills: 0 | Status: SHIPPED | OUTPATIENT
Start: 2022-09-27 | End: 2022-11-22

## 2022-09-27 RX ORDER — SIMVASTATIN 40 MG
TABLET ORAL
Qty: 90 TABLET | Refills: 0 | Status: SHIPPED | OUTPATIENT
Start: 2022-09-27 | End: 2022-11-22

## 2022-09-27 NOTE — TELEPHONE ENCOUNTER
Routing refill request to provider for review/approval because:  Labs out of range:  CR    Magda Hutton RN on 9/27/2022 at 10:22 AM

## 2022-10-17 ENCOUNTER — OFFICE VISIT (OUTPATIENT)
Dept: URGENT CARE | Facility: URGENT CARE | Age: 62
End: 2022-10-17
Payer: COMMERCIAL

## 2022-10-17 VITALS
HEART RATE: 77 BPM | OXYGEN SATURATION: 97 % | RESPIRATION RATE: 16 BRPM | DIASTOLIC BLOOD PRESSURE: 82 MMHG | SYSTOLIC BLOOD PRESSURE: 134 MMHG | TEMPERATURE: 97.4 F

## 2022-10-17 DIAGNOSIS — J01.90 ACUTE NON-RECURRENT SINUSITIS, UNSPECIFIED LOCATION: Primary | ICD-10-CM

## 2022-10-17 PROCEDURE — 99213 OFFICE O/P EST LOW 20 MIN: CPT | Performed by: PHYSICIAN ASSISTANT

## 2022-10-17 NOTE — PROGRESS NOTES
Assessment & Plan     Acute non-recurrent sinusitis, unspecified location  - amoxicillin-clavulanate (AUGMENTIN) 875-125 MG tablet  Dispense: 20 tablet; Refill: 0       Pt presents with sinus congestion and cold symptoms that began 8 days ago. He denies fever, chest pain and shortness of breath. He does have maxillary tenderness bilaterally. He will begin Augmentin BID for 10 days. Follow up if develops fever, SOB, CP or symptoms do not improve following antibiotic treatment.       Jennifer Rankin PA-C  [facility] LETY Pool is a 62 year old male who presents to clinic today for the following health issues:  Chief Complaint   Patient presents with     Sinus Problem     8 days, sinus pain/pressure/drainage, coughing, body aches     HPI  Pt reports ear/sinus pressure, post nasal drip, fatigue, malaise, scratchy throat, and productive cough   Pt reports he gets sinus infection annually with temperature change  Denies fever  Denies CP and SOB  Has been using OTC decongestant     Past Medical History:   Diagnosis Date     Esophageal reflux 9/16/2009     Gilbert's disease      Gout 9/16/2009     HTN (hypertension) 9/16/2009     Hyperlipidemia LDL goal <130 9/16/2009     Current Outpatient Medications   Medication     amLODIPine (NORVASC) 10 MG tablet     amoxicillin-clavulanate (AUGMENTIN) 875-125 MG tablet     losartan (COZAAR) 25 MG tablet     metFORMIN (GLUCOPHAGE XR) 500 MG 24 hr tablet     omeprazole (PRILOSEC) 20 MG DR capsule     simvastatin (ZOCOR) 40 MG tablet     Sildenafil Citrate (REVATIO PO)     No current facility-administered medications for this visit.     Social History     Socioeconomic History     Marital status:      Spouse name: Not on file     Number of children: Not on file     Years of education: Not on file     Highest education level: Not on file   Occupational History     Not on file   Tobacco Use     Smoking status: Never     Smokeless  tobacco: Never   Substance and Sexual Activity     Alcohol use: No     Alcohol/week: 0.0 standard drinks     Drug use: No     Sexual activity: Never   Other Topics Concern     Parent/sibling w/ CABG, MI or angioplasty before 65F 55M? Not Asked   Social History Narrative     Not on file     Social Determinants of Health     Financial Resource Strain: Not on file   Food Insecurity: Not on file   Transportation Needs: Not on file   Physical Activity: Not on file   Stress: Not on file   Social Connections: Not on file   Intimate Partner Violence: Not on file   Housing Stability: Not on file         Review of Systems  Detailed as above       Objective    /82 (BP Location: Right arm)   Pulse 77   Temp 97.4  F (36.3  C) (Tympanic)   Resp 16   SpO2 97%   Physical Exam  HENT:      Right Ear: Tympanic membrane normal.      Left Ear: Tympanic membrane normal.      Nose: Congestion present.      Right Sinus: Maxillary sinus tenderness present.      Left Sinus: Maxillary sinus tenderness present.      Mouth/Throat:      Mouth: Mucous membranes are moist.      Pharynx: Uvula midline. Posterior oropharyngeal erythema present. No oropharyngeal exudate.   Cardiovascular:      Rate and Rhythm: Normal rate and regular rhythm.      Heart sounds: Normal heart sounds.   Pulmonary:      Effort: Pulmonary effort is normal. No respiratory distress.      Breath sounds: Normal breath sounds.   Skin:     General: Skin is warm and dry.   Neurological:      Mental Status: He is alert.   Psychiatric:         Mood and Affect: Mood normal.

## 2022-10-22 ENCOUNTER — HEALTH MAINTENANCE LETTER (OUTPATIENT)
Age: 62
End: 2022-10-22

## 2022-10-25 ENCOUNTER — OFFICE VISIT (OUTPATIENT)
Dept: FAMILY MEDICINE | Facility: CLINIC | Age: 62
End: 2022-10-25
Payer: COMMERCIAL

## 2022-10-25 VITALS
BODY MASS INDEX: 34.21 KG/M2 | DIASTOLIC BLOOD PRESSURE: 83 MMHG | HEART RATE: 78 BPM | OXYGEN SATURATION: 98 % | SYSTOLIC BLOOD PRESSURE: 137 MMHG | WEIGHT: 225 LBS | TEMPERATURE: 99 F

## 2022-10-25 DIAGNOSIS — H61.22 IMPACTED CERUMEN OF LEFT EAR: Primary | ICD-10-CM

## 2022-10-25 PROCEDURE — 99207 PR NO CHARGE LOS: CPT | Mod: 25

## 2022-10-25 PROCEDURE — 69209 REMOVE IMPACTED EAR WAX UNI: CPT | Mod: LT

## 2022-10-25 ASSESSMENT — ENCOUNTER SYMPTOMS
CARDIOVASCULAR NEGATIVE: 1
CONSTITUTIONAL NEGATIVE: 1
RESPIRATORY NEGATIVE: 1

## 2022-10-25 NOTE — PROGRESS NOTES
Assessment & Plan     Impacted cerumen of left ear    Left ear cleaned by Yuliana CASTILLO MA using lavage. This was well tolerated by Yrn. Pain improved immediately. He is to complete his Augmentin.     Return if symptoms worsen or fail to improve, for Follow up.    At the end of the encounter, I discussed results, diagnosis, medications. Discussed red flags for immediate return to clinic/ER, as well as indications for follow up if no improvement. Patient understood and agreed to plan. Patient was stable for discharge.    Subjective     Yrn is a 62 year old male who presents to clinic today  for the following health issues:  Chief Complaint   Patient presents with     Ear Problem     Left ear pain, right is plugged with possible wax      Sinus Problem     Pt is taking Augmentin for sinus infection       Yrn presents with left ear pain x several days. He is currently being treated for his sinus infection. He reports his sinuses are improved but his ear is bothering him.           Review of Systems   Constitutional: Negative.    HENT: Positive for ear pain. Negative for ear discharge.    Respiratory: Negative.    Cardiovascular: Negative.    Skin: Negative.            Objective    /83 (BP Location: Right arm, Patient Position: Chair, Cuff Size: Adult Large)   Pulse 78   Temp 99  F (37.2  C)   Wt 102.1 kg (225 lb)   SpO2 98%   BMI 34.21 kg/m    Physical Exam  Constitutional:       Appearance: Normal appearance.   HENT:      Head: Normocephalic and atraumatic.      Right Ear: Tympanic membrane, ear canal and external ear normal.      Left Ear: There is impacted cerumen.   Skin:     General: Skin is warm and dry.   Neurological:      General: No focal deficit present.      Mental Status: He is alert and oriented to person, place, and time.              Nico Day PA-C

## 2022-11-22 ENCOUNTER — OFFICE VISIT (OUTPATIENT)
Dept: PEDIATRICS | Facility: CLINIC | Age: 62
End: 2022-11-22
Payer: COMMERCIAL

## 2022-11-22 VITALS
OXYGEN SATURATION: 97 % | TEMPERATURE: 97.4 F | DIASTOLIC BLOOD PRESSURE: 70 MMHG | HEART RATE: 74 BPM | BODY MASS INDEX: 34.25 KG/M2 | WEIGHT: 226 LBS | SYSTOLIC BLOOD PRESSURE: 124 MMHG | RESPIRATION RATE: 16 BRPM | HEIGHT: 68 IN

## 2022-11-22 DIAGNOSIS — E78.5 HYPERLIPIDEMIA LDL GOAL <130: ICD-10-CM

## 2022-11-22 DIAGNOSIS — K21.9 GASTROESOPHAGEAL REFLUX DISEASE WITHOUT ESOPHAGITIS: ICD-10-CM

## 2022-11-22 DIAGNOSIS — I10 ESSENTIAL HYPERTENSION WITH GOAL BLOOD PRESSURE LESS THAN 140/90: Primary | ICD-10-CM

## 2022-11-22 DIAGNOSIS — R35.0 URINARY FREQUENCY: ICD-10-CM

## 2022-11-22 DIAGNOSIS — E66.811 CLASS 1 OBESITY DUE TO EXCESS CALORIES WITH SERIOUS COMORBIDITY AND BODY MASS INDEX (BMI) OF 34.0 TO 34.9 IN ADULT: ICD-10-CM

## 2022-11-22 DIAGNOSIS — R73.03 PREDIABETES: ICD-10-CM

## 2022-11-22 DIAGNOSIS — E66.09 CLASS 1 OBESITY DUE TO EXCESS CALORIES WITH SERIOUS COMORBIDITY AND BODY MASS INDEX (BMI) OF 34.0 TO 34.9 IN ADULT: ICD-10-CM

## 2022-11-22 DIAGNOSIS — C61 PROSTATE CANCER (H): ICD-10-CM

## 2022-11-22 LAB
ALBUMIN SERPL BCG-MCNC: 4.6 G/DL (ref 3.5–5.2)
ALBUMIN UR-MCNC: NEGATIVE MG/DL
ALP SERPL-CCNC: 95 U/L (ref 40–129)
ALT SERPL W P-5'-P-CCNC: 64 U/L (ref 10–50)
ANION GAP SERPL CALCULATED.3IONS-SCNC: 18 MMOL/L (ref 7–15)
APPEARANCE UR: CLEAR
AST SERPL W P-5'-P-CCNC: 45 U/L (ref 10–50)
BACTERIA #/AREA URNS HPF: ABNORMAL /HPF
BILIRUB SERPL-MCNC: 1.4 MG/DL
BILIRUB UR QL STRIP: NEGATIVE
BUN SERPL-MCNC: 15.7 MG/DL (ref 8–23)
CALCIUM SERPL-MCNC: 9.3 MG/DL (ref 8.8–10.2)
CHLORIDE SERPL-SCNC: 104 MMOL/L (ref 98–107)
CHOLEST SERPL-MCNC: 165 MG/DL
COLOR UR AUTO: YELLOW
CREAT SERPL-MCNC: 1.35 MG/DL (ref 0.67–1.17)
DEPRECATED HCO3 PLAS-SCNC: 19 MMOL/L (ref 22–29)
GFR SERPL CREATININE-BSD FRML MDRD: 59 ML/MIN/1.73M2
GLUCOSE SERPL-MCNC: 117 MG/DL (ref 70–99)
GLUCOSE UR STRIP-MCNC: NEGATIVE MG/DL
HBA1C MFR BLD: 5.7 % (ref 0–5.6)
HDLC SERPL-MCNC: 53 MG/DL
HGB UR QL STRIP: NEGATIVE
KETONES UR STRIP-MCNC: ABNORMAL MG/DL
LDLC SERPL CALC-MCNC: 93 MG/DL
LEUKOCYTE ESTERASE UR QL STRIP: NEGATIVE
MUCOUS THREADS #/AREA URNS LPF: PRESENT /LPF
NITRATE UR QL: NEGATIVE
NONHDLC SERPL-MCNC: 112 MG/DL
PH UR STRIP: 5 [PH] (ref 5–7)
POTASSIUM SERPL-SCNC: 4.6 MMOL/L (ref 3.4–5.3)
PROT SERPL-MCNC: 7.5 G/DL (ref 6.4–8.3)
RBC #/AREA URNS AUTO: ABNORMAL /HPF
SODIUM SERPL-SCNC: 141 MMOL/L (ref 136–145)
SP GR UR STRIP: 1.02 (ref 1–1.03)
SQUAMOUS #/AREA URNS AUTO: ABNORMAL /LPF
TRIGL SERPL-MCNC: 97 MG/DL
UROBILINOGEN UR STRIP-ACNC: 0.2 E.U./DL
WBC #/AREA URNS AUTO: ABNORMAL /HPF

## 2022-11-22 PROCEDURE — 99214 OFFICE O/P EST MOD 30 MIN: CPT | Performed by: INTERNAL MEDICINE

## 2022-11-22 PROCEDURE — 83036 HEMOGLOBIN GLYCOSYLATED A1C: CPT | Performed by: INTERNAL MEDICINE

## 2022-11-22 PROCEDURE — 36415 COLL VENOUS BLD VENIPUNCTURE: CPT | Performed by: INTERNAL MEDICINE

## 2022-11-22 PROCEDURE — 81001 URINALYSIS AUTO W/SCOPE: CPT | Performed by: INTERNAL MEDICINE

## 2022-11-22 PROCEDURE — 80061 LIPID PANEL: CPT | Performed by: INTERNAL MEDICINE

## 2022-11-22 PROCEDURE — 80053 COMPREHEN METABOLIC PANEL: CPT | Performed by: INTERNAL MEDICINE

## 2022-11-22 PROCEDURE — 84153 ASSAY OF PSA TOTAL: CPT | Performed by: INTERNAL MEDICINE

## 2022-11-22 RX ORDER — SIMVASTATIN 40 MG
TABLET ORAL
Qty: 90 TABLET | Refills: 3 | Status: SHIPPED | OUTPATIENT
Start: 2022-11-22 | End: 2023-02-15

## 2022-11-22 RX ORDER — AMLODIPINE BESYLATE 10 MG/1
10 TABLET ORAL DAILY
Qty: 90 TABLET | Refills: 3 | Status: SHIPPED | OUTPATIENT
Start: 2022-11-22 | End: 2023-02-15

## 2022-11-22 RX ORDER — METFORMIN HCL 500 MG
TABLET, EXTENDED RELEASE 24 HR ORAL
Qty: 360 TABLET | Refills: 1 | Status: SHIPPED | OUTPATIENT
Start: 2022-11-22 | End: 2023-02-15

## 2022-11-22 RX ORDER — LOSARTAN POTASSIUM 25 MG/1
25 TABLET ORAL DAILY
Qty: 90 TABLET | Refills: 3 | Status: SHIPPED | OUTPATIENT
Start: 2022-11-22 | End: 2023-02-15

## 2022-11-22 ASSESSMENT — PAIN SCALES - GENERAL: PAINLEVEL: NO PAIN (0)

## 2022-11-22 NOTE — PROGRESS NOTES
Assessment & Plan     (I10) Essential hypertension with goal blood pressure less than 140/90  (primary encounter diagnosis)  Comment: Adequate control.  Continue current medications  Plan: amLODIPine (NORVASC) 10 MG tablet, losartan         (COZAAR) 25 MG tablet          (R73.03) Prediabetes  Comment:   Plan: metFORMIN (GLUCOPHAGE XR) 500 MG 24 hr tablet,         Hemoglobin A1c          (C61) Prostate cancer (H)  Comment:    Recommended annual PSA  Plan: PSA, tumor marker    (E78.5) Hyperlipidemia LDL goal <130  Comment:   Plan: Lipid panel reflex to direct LDL Non-fasting,         simvastatin (ZOCOR) 40 MG tablet, Comprehensive        metabolic panel (BMP + Alb, Alk Phos, ALT, AST,        Total. Bili, TP)          (K21.9) Gastroesophageal reflux disease without esophagitis  Comment: well controlled  Plan: omeprazole (PRILOSEC) 20 MG DR capsule          (E66.09,  Z68.34) Class 1 obesity due to excess calories with serious comorbidity and body mass index (BMI) of 34.0 to 34.9 in adult  Comment:   Plan: obesity with Prediabetes/ weight reduction will improve blood sugar control and HTN      Return in about 1 year (around 11/22/2023) for Annual preventative visit.    Enrico Reagan MD  [facility] LETY Pool is a 62 year old, presenting for the following health issues:  Recheck Medication      History of Present Illness       Reason for visit:  Yearly exam    He eats 0-1 servings of fruits and vegetables daily.He consumes 0 sweetened beverage(s) daily.He exercises with enough effort to increase his heart rate 9 or less minutes per day.  He exercises with enough effort to increase his heart rate 3 or less days per week.   He is taking medications regularly.     Prediabetes   Has continued on metformin, tolerating without side effects    HTN: tolerating current medications without side effects    Prostate cancer (H)   Prior prostatectomy, due for labwork.  Mild urinary frequency past few  "weeks, no dysuria    Hyperlipidemia LDL goal <130  Lab Results   Component Value Date     09/30/2021    LDL 92 06/09/2020       Patient Active Problem List   Diagnosis     Hyperlipidemia LDL goal <130     Hepatic steatosis     Gilbert's disease     Prediabetes     CKD (chronic kidney disease) stage 3, GFR 30-59 ml/min (H)     Essential hypertension with goal blood pressure less than 140/90     Gout, unspecified cause, unspecified chronicity, unspecified site     Gastroesophageal reflux disease without esophagitis     Class 1 obesity due to excess calories with serious comorbidity and body mass index (BMI) of 34.0 to 34.9 in adult     Prostate cancer (H)     Stress incontinence of urine     Calculus of gallbladder without cholecystitis     Morbid obesity (H)     Current Outpatient Medications   Medication Sig Dispense Refill     amLODIPine (NORVASC) 10 MG tablet Take 1 tablet (10 mg) by mouth daily 90 tablet 3     losartan (COZAAR) 25 MG tablet Take 1 tablet (25 mg) by mouth daily 90 tablet 3     metFORMIN (GLUCOPHAGE XR) 500 MG 24 hr tablet TAKE 4 TABLETS BY MOUTH EVERY  tablet 1     omeprazole (PRILOSEC) 20 MG DR capsule Take 1 capsule (20 mg) by mouth daily as needed (reflux) 90 capsule 3     simvastatin (ZOCOR) 40 MG tablet TAKE ONE TABLET BY MOUTH AT BEDTIME 90 tablet 3            Review of Systems         Objective    /70 (Cuff Size: Adult Large)   Pulse 74   Temp 97.4  F (36.3  C) (Tympanic)   Resp 16   Ht 1.727 m (5' 8\")   Wt 102.5 kg (226 lb)   SpO2 97%   BMI 34.36 kg/m    Body mass index is 34.36 kg/m .  Physical Exam   GENERAL: healthy, alert and no distress  EYES: Eyes grossly normal to inspection, PERRL and conjunctivae and sclerae normal  HENT: ear canals and TM's normal, nose and mouth without ulcers or lesions  NECK: no adenopathy, no asymmetry, masses, or scars and thyroid normal to palpation  RESP: lungs clear to auscultation - no rales, rhonchi or wheezes  CV: regular " rate and rhythm, normal S1 S2, no S3 or S4, no murmur, click or rub, no peripheral edema and peripheral pulses strong  MS: no gross musculoskeletal defects noted, no edema  SKIN: no suspicious lesions or rashes  NEURO: Normal strength and tone, mentation intact and speech normal  PSYCH: mentation appears normal, affect normal/bright

## 2022-11-23 LAB — PSA SERPL-MCNC: <0.01 NG/ML (ref 0–4.5)

## 2022-12-04 ENCOUNTER — HEALTH MAINTENANCE LETTER (OUTPATIENT)
Age: 62
End: 2022-12-04

## 2023-02-14 SDOH — ECONOMIC STABILITY: FOOD INSECURITY: WITHIN THE PAST 12 MONTHS, YOU WORRIED THAT YOUR FOOD WOULD RUN OUT BEFORE YOU GOT MONEY TO BUY MORE.: NEVER TRUE

## 2023-02-14 SDOH — ECONOMIC STABILITY: FOOD INSECURITY: WITHIN THE PAST 12 MONTHS, THE FOOD YOU BOUGHT JUST DIDN'T LAST AND YOU DIDN'T HAVE MONEY TO GET MORE.: NEVER TRUE

## 2023-02-14 SDOH — ECONOMIC STABILITY: TRANSPORTATION INSECURITY
IN THE PAST 12 MONTHS, HAS LACK OF TRANSPORTATION KEPT YOU FROM MEETINGS, WORK, OR FROM GETTING THINGS NEEDED FOR DAILY LIVING?: NO

## 2023-02-14 SDOH — ECONOMIC STABILITY: TRANSPORTATION INSECURITY
IN THE PAST 12 MONTHS, HAS THE LACK OF TRANSPORTATION KEPT YOU FROM MEDICAL APPOINTMENTS OR FROM GETTING MEDICATIONS?: NO

## 2023-02-14 SDOH — HEALTH STABILITY: PHYSICAL HEALTH: ON AVERAGE, HOW MANY DAYS PER WEEK DO YOU ENGAGE IN MODERATE TO STRENUOUS EXERCISE (LIKE A BRISK WALK)?: 1 DAY

## 2023-02-14 SDOH — ECONOMIC STABILITY: INCOME INSECURITY: HOW HARD IS IT FOR YOU TO PAY FOR THE VERY BASICS LIKE FOOD, HOUSING, MEDICAL CARE, AND HEATING?: NOT HARD AT ALL

## 2023-02-14 SDOH — HEALTH STABILITY: PHYSICAL HEALTH: ON AVERAGE, HOW MANY MINUTES DO YOU ENGAGE IN EXERCISE AT THIS LEVEL?: 20 MIN

## 2023-02-14 SDOH — ECONOMIC STABILITY: INCOME INSECURITY: IN THE LAST 12 MONTHS, WAS THERE A TIME WHEN YOU WERE NOT ABLE TO PAY THE MORTGAGE OR RENT ON TIME?: NO

## 2023-02-14 ASSESSMENT — ENCOUNTER SYMPTOMS
CONSTIPATION: 0
JOINT SWELLING: 0
FREQUENCY: 0
PALPITATIONS: 0
MYALGIAS: 0
NERVOUS/ANXIOUS: 0
NAUSEA: 0
FEVER: 0
PARESTHESIAS: 0
ARTHRALGIAS: 0
ABDOMINAL PAIN: 0
DYSURIA: 0
WEAKNESS: 0
HEARTBURN: 1
DIARRHEA: 0
SORE THROAT: 0
SHORTNESS OF BREATH: 0
HEADACHES: 0
DIZZINESS: 0
CHILLS: 0
COUGH: 0
EYE PAIN: 0
HEMATOCHEZIA: 0
HEMATURIA: 0

## 2023-02-14 ASSESSMENT — SOCIAL DETERMINANTS OF HEALTH (SDOH)
HOW OFTEN DO YOU ATTEND CHURCH OR RELIGIOUS SERVICES?: 1 TO 4 TIMES PER YEAR
HOW OFTEN DO YOU GET TOGETHER WITH FRIENDS OR RELATIVES?: ONCE A WEEK
IN A TYPICAL WEEK, HOW MANY TIMES DO YOU TALK ON THE PHONE WITH FAMILY, FRIENDS, OR NEIGHBORS?: TWICE A WEEK
DO YOU BELONG TO ANY CLUBS OR ORGANIZATIONS SUCH AS CHURCH GROUPS UNIONS, FRATERNAL OR ATHLETIC GROUPS, OR SCHOOL GROUPS?: NO

## 2023-02-14 ASSESSMENT — LIFESTYLE VARIABLES
HOW OFTEN DO YOU HAVE SIX OR MORE DRINKS ON ONE OCCASION: NEVER
AUDIT-C TOTAL SCORE: 1
HOW MANY STANDARD DRINKS CONTAINING ALCOHOL DO YOU HAVE ON A TYPICAL DAY: 1 OR 2
HOW OFTEN DO YOU HAVE A DRINK CONTAINING ALCOHOL: MONTHLY OR LESS
SKIP TO QUESTIONS 9-10: 1

## 2023-02-15 ENCOUNTER — OFFICE VISIT (OUTPATIENT)
Dept: PEDIATRICS | Facility: CLINIC | Age: 63
End: 2023-02-15
Payer: COMMERCIAL

## 2023-02-15 VITALS
TEMPERATURE: 97.9 F | SYSTOLIC BLOOD PRESSURE: 124 MMHG | BODY MASS INDEX: 34.4 KG/M2 | HEIGHT: 68 IN | DIASTOLIC BLOOD PRESSURE: 72 MMHG | HEART RATE: 77 BPM | WEIGHT: 227 LBS | OXYGEN SATURATION: 97 % | RESPIRATION RATE: 16 BRPM

## 2023-02-15 DIAGNOSIS — D12.6 TUBULAR ADENOMA OF COLON: ICD-10-CM

## 2023-02-15 DIAGNOSIS — E66.01 MORBID OBESITY (H): ICD-10-CM

## 2023-02-15 DIAGNOSIS — R73.03 PREDIABETES: ICD-10-CM

## 2023-02-15 DIAGNOSIS — K21.9 GASTROESOPHAGEAL REFLUX DISEASE WITHOUT ESOPHAGITIS: ICD-10-CM

## 2023-02-15 DIAGNOSIS — E78.5 HYPERLIPIDEMIA LDL GOAL <130: ICD-10-CM

## 2023-02-15 DIAGNOSIS — I10 ESSENTIAL HYPERTENSION WITH GOAL BLOOD PRESSURE LESS THAN 140/90: ICD-10-CM

## 2023-02-15 DIAGNOSIS — Z00.00 ROUTINE GENERAL MEDICAL EXAMINATION AT A HEALTH CARE FACILITY: Primary | ICD-10-CM

## 2023-02-15 DIAGNOSIS — N18.31 STAGE 3A CHRONIC KIDNEY DISEASE (H): ICD-10-CM

## 2023-02-15 DIAGNOSIS — C61 PROSTATE CANCER (H): ICD-10-CM

## 2023-02-15 PROCEDURE — 99213 OFFICE O/P EST LOW 20 MIN: CPT | Mod: 25 | Performed by: INTERNAL MEDICINE

## 2023-02-15 PROCEDURE — 99396 PREV VISIT EST AGE 40-64: CPT | Performed by: INTERNAL MEDICINE

## 2023-02-15 RX ORDER — METFORMIN HCL 500 MG
TABLET, EXTENDED RELEASE 24 HR ORAL
Qty: 360 TABLET | Refills: 1 | Status: SHIPPED | OUTPATIENT
Start: 2023-02-15 | End: 2023-08-30

## 2023-02-15 RX ORDER — AMLODIPINE BESYLATE 10 MG/1
10 TABLET ORAL DAILY
Qty: 90 TABLET | Refills: 3 | Status: SHIPPED | OUTPATIENT
Start: 2023-02-15 | End: 2024-03-21

## 2023-02-15 RX ORDER — SIMVASTATIN 40 MG
TABLET ORAL
Qty: 90 TABLET | Refills: 3 | Status: SHIPPED | OUTPATIENT
Start: 2023-02-15 | End: 2024-03-21

## 2023-02-15 RX ORDER — LOSARTAN POTASSIUM 25 MG/1
25 TABLET ORAL DAILY
Qty: 90 TABLET | Refills: 3 | Status: SHIPPED | OUTPATIENT
Start: 2023-02-15 | End: 2024-03-21

## 2023-02-15 ASSESSMENT — ENCOUNTER SYMPTOMS
COUGH: 0
HEMATURIA: 0
EYE PAIN: 0
DIZZINESS: 0
SORE THROAT: 0
PARESTHESIAS: 0
NERVOUS/ANXIOUS: 0
CHILLS: 0
FREQUENCY: 0
SHORTNESS OF BREATH: 0
HEARTBURN: 1
DYSURIA: 0
PALPITATIONS: 0
HEADACHES: 0
ABDOMINAL PAIN: 0
CONSTIPATION: 0
NAUSEA: 0
JOINT SWELLING: 0
DIARRHEA: 0
ARTHRALGIAS: 0
MYALGIAS: 0
FEVER: 0
HEMATOCHEZIA: 0
WEAKNESS: 0

## 2023-02-15 ASSESSMENT — PAIN SCALES - GENERAL: PAINLEVEL: NO PAIN (0)

## 2023-02-15 NOTE — PROGRESS NOTES
SUBJECTIVE:   CC: Yrn is an 62 year old who presents for preventative health visit.   Healthy Habits:     Getting at least 3 servings of Calcium per day:  Yes    Bi-annual eye exam:  Yes    Dental care twice a year:  Yes    Sleep apnea or symptoms of sleep apnea:  None    Diet:  Regular (no restrictions)    Frequency of exercise:  2-3 days/week    Duration of exercise:  15-30 minutes    Taking medications regularly:  Yes    Medication side effects:  None    PHQ-2 Total Score: 0    Additional concerns today:  Yes      Today's PHQ-2 Score:   PHQ-2 ( 1999 Pfizer) 2/14/2023   Q1: Little interest or pleasure in doing things 0   Q2: Feeling down, depressed or hopeless 0   PHQ-2 Score 0   PHQ-2 Total Score (12-17 Years)- Positive if 3 or more points; Administer PHQ-A if positive -   Q1: Little interest or pleasure in doing things Not at all   Q2: Feeling down, depressed or hopeless Not at all   PHQ-2 Score 0           Social History     Tobacco Use     Smoking status: Never     Smokeless tobacco: Never   Substance Use Topics     Alcohol use: No     Alcohol/week: 0.0 standard drinks     If you drink alcohol do you typically have >3 drinks per day or >7 drinks per week? No    Alcohol Use 2/15/2023   Prescreen: >3 drinks/day or >7 drinks/week? -   Prescreen: >3 drinks/day or >7 drinks/week? No       Last PSA:   PSA   Date Value Ref Range Status   06/09/2020 <0.01 0 - 4 ug/L Final     Comment:     Assay Method:  Chemiluminescence using Siemens Vista analyzer     PSA Tumor Marker   Date Value Ref Range Status   11/22/2022 <0.01 0.00 - 4.50 ng/mL Final   09/30/2021 <0.01 0.00 - 4.00 ug/L Final       Reviewed orders with patient. Reviewed health maintenance and updated orders accordingly - Yes      Reviewed and updated as needed this visit by clinical staff   Tobacco  Allergies  Meds              Reviewed and updated as needed this visit by Provider                   Patient Active Problem List   Diagnosis      Hyperlipidemia LDL goal <130     Hepatic steatosis     Gilbert's disease     Prediabetes     CKD (chronic kidney disease) stage 3, GFR 30-59 ml/min (H)     Essential hypertension with goal blood pressure less than 140/90     Gout, unspecified cause, unspecified chronicity, unspecified site     Gastroesophageal reflux disease without esophagitis     Class 1 obesity due to excess calories with serious comorbidity and body mass index (BMI) of 34.0 to 34.9 in adult     Prostate cancer (H)     Stress incontinence of urine     Calculus of gallbladder without cholecystitis     Morbid obesity (H)     Tubular adenoma of colon     Current Outpatient Medications   Medication Sig Dispense Refill     amLODIPine (NORVASC) 10 MG tablet Take 1 tablet (10 mg) by mouth daily 90 tablet 3     losartan (COZAAR) 25 MG tablet Take 1 tablet (25 mg) by mouth daily 90 tablet 3     metFORMIN (GLUCOPHAGE XR) 500 MG 24 hr tablet TAKE 4 TABLETS BY MOUTH EVERY  tablet 1     omeprazole (PRILOSEC) 20 MG DR capsule Take 1 capsule (20 mg) by mouth daily as needed (reflux) 90 capsule 3     simvastatin (ZOCOR) 40 MG tablet TAKE ONE TABLET BY MOUTH AT BEDTIME 90 tablet 3        Review of Systems   Constitutional: Negative for chills and fever.   HENT: Positive for ear pain. Negative for congestion, hearing loss and sore throat.    Eyes: Negative for pain and visual disturbance.   Respiratory: Negative for cough and shortness of breath.    Cardiovascular: Negative for chest pain, palpitations and peripheral edema.   Gastrointestinal: Positive for heartburn. Negative for abdominal pain, constipation, diarrhea, hematochezia and nausea.   Genitourinary: Negative for dysuria, frequency, genital sores, hematuria, impotence, penile discharge and urgency.   Musculoskeletal: Negative for arthralgias, joint swelling and myalgias.   Skin: Negative for rash.   Neurological: Negative for dizziness, weakness, headaches and paresthesias.  "  Psychiatric/Behavioral: Negative for mood changes. The patient is not nervous/anxious.          OBJECTIVE:   /72 (Cuff Size: Adult Large)   Pulse 77   Temp 97.9  F (36.6  C) (Tympanic)   Resp 16   Ht 1.727 m (5' 8\")   Wt 103 kg (227 lb)   SpO2 97%   BMI 34.52 kg/m      Physical Exam  GENERAL: healthy, alert and no distress  EYES: Eyes grossly normal to inspection, PERRL and conjunctivae and sclerae normal  HENT: ear canals and TM's normal, nose and mouth without ulcers or lesions  NECK: no adenopathy, no asymmetry, masses, or scars and thyroid normal to palpation  RESP: lungs clear to auscultation - no rales, rhonchi or wheezes  CV: regular rate and rhythm, normal S1 S2, no S3 or S4, no murmur, click or rub, no peripheral edema and peripheral pulses strong  ABDOMEN: soft, nontender, no hepatosplenomegaly, no masses and bowel sounds normal  MS: no gross musculoskeletal defects noted, no edema  SKIN: no suspicious lesions or rashes  NEURO: Normal strength and tone, mentation intact and speech normal  PSYCH: mentation appears normal, affect normal/bright    ASSESSMENT/PLAN:   (Z00.00) Routine general medical examination at a health care facility  (primary encounter diagnosis)    (I10) Essential hypertension with goal blood pressure less than 140/90  Comment:     Adequate control.  Continue current medications  Plan: amLODIPine (NORVASC) 10 MG tablet, losartan         (COZAAR) 25 MG tablet          (R73.03) Prediabetes  Comment:    Continue metformin, discussed weight loss  Lab Results   Component Value Date    A1C 5.7 11/22/2022   Plan: metFORMIN (GLUCOPHAGE XR) 500 MG 24 hr tablet          (E78.5) Hyperlipidemia LDL goal <130  Comment: Adequate control.  Continue current medication.   Lab Results   Component Value Date    LDL 93 11/22/2022    Plan: simvastatin (ZOCOR) 40 MG tablet          (C61) Prostate cancer (H)  Comment:   Plan:    No recurrence to date, small amts incontinence since " "prostatectomy  Lab Results   Component Value Date    PSA <0.01 11/22/2022    PSA <0.01 09/30/2021     (N18.31) Stage 3a chronic kidney disease (H)  Comment:   Plan: stable    (E66.01) BMI 34  Comment:   Plan: Weight management and obesity discussed    (K21.9) Gastroesophageal reflux disease without esophagitis  Comment: continue ppi prn  Plan: omeprazole (PRILOSEC) 20 MG DR capsule          (D12.6) Tubular adenoma of colon  Comment:   Plan: colonoscopy q5yrs, next 2024      COUNSELING:   Reviewed preventive health counseling, as reflected in patient instructions       Regular exercise       Healthy diet/nutrition       Colorectal cancer screening      BMI:   Estimated body mass index is 34.52 kg/m  as calculated from the following:    Height as of this encounter: 1.727 m (5' 8\").    Weight as of this encounter: 103 kg (227 lb).   Weight management plan: Discussed healthy diet and exercise guidelines      He reports that he has never smoked. He has never used smokeless tobacco.            Enrico Reagan MD  [facility]  "

## 2023-08-26 DIAGNOSIS — R73.03 PREDIABETES: ICD-10-CM

## 2023-08-30 RX ORDER — METFORMIN HCL 500 MG
TABLET, EXTENDED RELEASE 24 HR ORAL
Qty: 360 TABLET | Refills: 1 | Status: SHIPPED | OUTPATIENT
Start: 2023-08-30 | End: 2024-03-21

## 2023-08-30 NOTE — TELEPHONE ENCOUNTER
Routing refill request to provider for review/approval because:  Labs out of range:  A1C  Labs not current:  A1C  Patient needs to be seen because:  due for 6 m appt    Rose Lou RN, BSN  [facility]

## 2023-11-29 DIAGNOSIS — I10 ESSENTIAL HYPERTENSION WITH GOAL BLOOD PRESSURE LESS THAN 140/90: ICD-10-CM

## 2023-11-29 RX ORDER — LOSARTAN POTASSIUM 25 MG/1
25 TABLET ORAL DAILY
Qty: 90 TABLET | Refills: 3 | OUTPATIENT
Start: 2023-11-29

## 2024-01-04 ENCOUNTER — OFFICE VISIT (OUTPATIENT)
Dept: URGENT CARE | Facility: URGENT CARE | Age: 64
End: 2024-01-04
Payer: MEDICARE

## 2024-01-04 VITALS
DIASTOLIC BLOOD PRESSURE: 85 MMHG | OXYGEN SATURATION: 96 % | HEART RATE: 100 BPM | SYSTOLIC BLOOD PRESSURE: 125 MMHG | TEMPERATURE: 99.7 F | RESPIRATION RATE: 20 BRPM

## 2024-01-04 DIAGNOSIS — J32.9 BACTERIAL SINUSITIS: Primary | ICD-10-CM

## 2024-01-04 DIAGNOSIS — R09.81 NASAL CONGESTION: ICD-10-CM

## 2024-01-04 DIAGNOSIS — B96.89 BACTERIAL SINUSITIS: Primary | ICD-10-CM

## 2024-01-04 DIAGNOSIS — R51.9 SINUS HEADACHE: ICD-10-CM

## 2024-01-04 DIAGNOSIS — N18.9 CHRONIC KIDNEY DISEASE, UNSPECIFIED CKD STAGE: ICD-10-CM

## 2024-01-04 PROCEDURE — 99213 OFFICE O/P EST LOW 20 MIN: CPT | Performed by: PHYSICIAN ASSISTANT

## 2024-01-04 RX ORDER — OXYMETAZOLINE HYDROCHLORIDE 0.05 G/100ML
2 SPRAY NASAL 2 TIMES DAILY
Qty: 30 ML | Refills: 0 | Status: SHIPPED | OUTPATIENT
Start: 2024-01-04 | End: 2024-01-07

## 2024-01-04 RX ORDER — PREDNISONE 20 MG/1
20 TABLET ORAL 2 TIMES DAILY
Qty: 10 TABLET | Refills: 0 | Status: SHIPPED | OUTPATIENT
Start: 2024-01-04 | End: 2024-03-21

## 2024-01-05 NOTE — PROGRESS NOTES
"  Assessment & Plan     Bacterial sinusitis    You have been diagnosed with a bacterial sinus infection. Sinusitis can occur during a cold. It can also happen due to allergies to pollens and other particles in the air.  A sinus infection can sometimes cause fever, headache, and facial pain. There is often green or yellow fluid draining from the nose or into the back of the throat (post-nasal drip). This infection is treated with antibiotics.  Home care  Take the full course of antibiotics as instructed. Don't stop taking them, even when you feel better.  Drink plenty of water, hot tea, and other liquids as directed by the healthcare provider. This may help thin nasal mucus. It also may help your sinuses drain fluids.  Heat may help soothe painful areas of your face. Use a towel soaked in hot water. Or,  the shower and direct the warm spray onto your face. Using a vaporizer along with a menthol rub at night may also help soothe symptoms.     - amoxicillin-clavulanate (AUGMENTIN) 875-125 MG tablet  Dispense: 20 tablet; Refill: 0    Sinus headache    Hot showers, steam  Prednisone for sinus headache    - predniSONE (DELTASONE) 20 MG tablet  Dispense: 10 tablet; Refill: 0  Nasal congestion    Afrin for nasal congestion for the next 3-4 night  - oxymetazoline (AFRIN) 0.05 % nasal spray  Dispense: 30 mL; Refill: 0    Chronic kidney disease, unspecified CKD stage    CrCl cannot be calculated (Patient's most recent lab result is older than the maximum 365 days allowed.).        Review of external notes as documented elsewhere in note       BMI:   Estimated body mass index is 34.52 kg/m  as calculated from the following:    Height as of 2/15/23: 1.727 m (5' 8\").    Weight as of 2/15/23: 103 kg (227 lb).     At today's visit with Yrn Will , we discussed results, diagnosis, medications and formulated a plan.  We also discussed red flags for immediate return to clinic/ER, as well as indications for follow up with " PCP if not improved in 3 days. Patient understood and agreed to plan. Yrn Will was discharged with stable vitals and has no further questions.       No follow-ups on file.    Cornelio Blackburn, [facility], PA-C  [facility] LET[facility]UCÍA Pool is a 63 year old, presenting for the following health issues:  Sinus Problem (2 weeks, sinus pain/pressure/drainage, headache, ear congestion, chills)      HPI   Review of Systems   Constitutional, HEENT, cardiovascular, pulmonary, GI, , musculoskeletal, neuro, skin, endocrine and psych systems are negative, except as otherwise noted.      Objective    /85   Pulse 100   Temp 99.7  F (37.6  C)   Resp 20   SpO2 96%   There is no height or weight on file to calculate BMI.  Physical Exam   GENERAL: healthy, alert and no distress  EYES: Eyes grossly normal to inspection, PERRL and conjunctivae and sclerae normal  HENT: normal cephalic/atraumatic, ear canals and TM's normal, nasal mucosa edematous , oropharynx clear, oral mucous membranes moist, and sinuses: maxillary, frontal tenderness on both sides  NECK: no adenopathy, no asymmetry, masses, or scars and thyroid normal to palpation  RESP: lungs clear to auscultation - no rales, rhonchi or wheezes  CV: regular rate and rhythm, normal S1 S2, no S3 or S4, no murmur, click or rub, no peripheral edema and peripheral pulses strong  MS: no gross musculoskeletal defects noted, no edema  SKIN: no suspicious lesions or rashes  NEURO: Normal strength and tone, mentation intact and speech normal  PSYCH: mentation appears normal, affect normal/bright        No results found for any visits on 01/04/24.

## 2024-01-31 ENCOUNTER — PATIENT OUTREACH (OUTPATIENT)
Dept: GASTROENTEROLOGY | Facility: CLINIC | Age: 64
End: 2024-01-31
Payer: MEDICARE

## 2024-03-18 SDOH — HEALTH STABILITY: PHYSICAL HEALTH: ON AVERAGE, HOW MANY DAYS PER WEEK DO YOU ENGAGE IN MODERATE TO STRENUOUS EXERCISE (LIKE A BRISK WALK)?: 3 DAYS

## 2024-03-18 SDOH — HEALTH STABILITY: PHYSICAL HEALTH: ON AVERAGE, HOW MANY MINUTES DO YOU ENGAGE IN EXERCISE AT THIS LEVEL?: 20 MIN

## 2024-03-18 ASSESSMENT — SOCIAL DETERMINANTS OF HEALTH (SDOH): HOW OFTEN DO YOU GET TOGETHER WITH FRIENDS OR RELATIVES?: MORE THAN THREE TIMES A WEEK

## 2024-03-21 ENCOUNTER — OFFICE VISIT (OUTPATIENT)
Dept: PEDIATRICS | Facility: CLINIC | Age: 64
End: 2024-03-21
Payer: MEDICARE

## 2024-03-21 VITALS
HEIGHT: 68 IN | DIASTOLIC BLOOD PRESSURE: 85 MMHG | HEART RATE: 81 BPM | BODY MASS INDEX: 33.42 KG/M2 | OXYGEN SATURATION: 98 % | SYSTOLIC BLOOD PRESSURE: 132 MMHG | TEMPERATURE: 98 F | RESPIRATION RATE: 18 BRPM | WEIGHT: 220.5 LBS

## 2024-03-21 DIAGNOSIS — E66.01 MORBID OBESITY (H): ICD-10-CM

## 2024-03-21 DIAGNOSIS — D12.6 TUBULAR ADENOMA OF COLON: ICD-10-CM

## 2024-03-21 DIAGNOSIS — K21.9 GASTROESOPHAGEAL REFLUX DISEASE WITHOUT ESOPHAGITIS: ICD-10-CM

## 2024-03-21 DIAGNOSIS — R51.9 HEADACHE WITH NEUROLOGIC DEFICIT: ICD-10-CM

## 2024-03-21 DIAGNOSIS — Z00.00 ENCOUNTER FOR MEDICARE ANNUAL WELLNESS EXAM: Primary | ICD-10-CM

## 2024-03-21 DIAGNOSIS — C61 PROSTATE CANCER (H): ICD-10-CM

## 2024-03-21 DIAGNOSIS — M10.9 GOUT, UNSPECIFIED CAUSE, UNSPECIFIED CHRONICITY, UNSPECIFIED SITE: ICD-10-CM

## 2024-03-21 DIAGNOSIS — I10 ESSENTIAL HYPERTENSION WITH GOAL BLOOD PRESSURE LESS THAN 140/90: ICD-10-CM

## 2024-03-21 DIAGNOSIS — N18.31 STAGE 3A CHRONIC KIDNEY DISEASE (H): ICD-10-CM

## 2024-03-21 DIAGNOSIS — E78.5 HYPERLIPIDEMIA LDL GOAL <130: ICD-10-CM

## 2024-03-21 DIAGNOSIS — B35.6 TINEA CRURIS: ICD-10-CM

## 2024-03-21 DIAGNOSIS — H53.8 BLURRED VISION: ICD-10-CM

## 2024-03-21 DIAGNOSIS — Z82.49 FAMILY HISTORY OF CEREBRAL ANEURYSM: ICD-10-CM

## 2024-03-21 DIAGNOSIS — R29.818 HEADACHE WITH NEUROLOGIC DEFICIT: ICD-10-CM

## 2024-03-21 DIAGNOSIS — R73.03 PREDIABETES: ICD-10-CM

## 2024-03-21 LAB — HBA1C MFR BLD: 5.2 % (ref 0–5.6)

## 2024-03-21 PROCEDURE — 83036 HEMOGLOBIN GLYCOSYLATED A1C: CPT | Performed by: INTERNAL MEDICINE

## 2024-03-21 PROCEDURE — 84153 ASSAY OF PSA TOTAL: CPT | Performed by: INTERNAL MEDICINE

## 2024-03-21 PROCEDURE — G0439 PPPS, SUBSEQ VISIT: HCPCS | Performed by: INTERNAL MEDICINE

## 2024-03-21 PROCEDURE — 99214 OFFICE O/P EST MOD 30 MIN: CPT | Mod: 25 | Performed by: INTERNAL MEDICINE

## 2024-03-21 PROCEDURE — 80061 LIPID PANEL: CPT | Performed by: INTERNAL MEDICINE

## 2024-03-21 PROCEDURE — 36415 COLL VENOUS BLD VENIPUNCTURE: CPT | Performed by: INTERNAL MEDICINE

## 2024-03-21 PROCEDURE — 80053 COMPREHEN METABOLIC PANEL: CPT | Performed by: INTERNAL MEDICINE

## 2024-03-21 RX ORDER — LOSARTAN POTASSIUM 25 MG/1
25 TABLET ORAL DAILY
Qty: 90 TABLET | Refills: 11 | Status: SHIPPED | OUTPATIENT
Start: 2024-03-21

## 2024-03-21 RX ORDER — METFORMIN HCL 500 MG
TABLET, EXTENDED RELEASE 24 HR ORAL
Qty: 360 TABLET | Refills: 3 | Status: SHIPPED | OUTPATIENT
Start: 2024-03-21

## 2024-03-21 RX ORDER — CLOTRIMAZOLE 1 %
CREAM (GRAM) TOPICAL 2 TIMES DAILY PRN
Qty: 45 G | Refills: 3 | Status: SHIPPED | OUTPATIENT
Start: 2024-03-21 | End: 2024-03-25

## 2024-03-21 RX ORDER — TRIAMCINOLONE ACETONIDE 1 MG/G
CREAM TOPICAL 2 TIMES DAILY PRN
Qty: 45 G | Refills: 3 | Status: SHIPPED | OUTPATIENT
Start: 2024-03-21

## 2024-03-21 RX ORDER — SIMVASTATIN 40 MG
TABLET ORAL
Qty: 90 TABLET | Refills: 11 | Status: SHIPPED | OUTPATIENT
Start: 2024-03-21

## 2024-03-21 RX ORDER — AMLODIPINE BESYLATE 10 MG/1
10 TABLET ORAL DAILY
Qty: 90 TABLET | Refills: 11 | Status: SHIPPED | OUTPATIENT
Start: 2024-03-21

## 2024-03-21 ASSESSMENT — PAIN SCALES - GENERAL: PAINLEVEL: MILD PAIN (2)

## 2024-03-21 NOTE — PROGRESS NOTES
Preventive Care Visit  [facility] LET[facility] Reagan MD, Internal Medicine - Pediatrics  Mar 21, 2024      Assessment & Plan     (Z00.00) Encounter for Medicare annual wellness exam  (primary encounter diagnosis)    (C61) Prostate cancer (H)  Comment: Prostate cancer, history of prostatectomy.  Some residual urinary incontinence since surgery.  Due for PSA  Plan: PSA, tumor marker          (R51.9,  R29.818) Headache with neurologic deficit  (H53.8) Blurred vision  (Z82.49) Family history of cerebral aneurysm  Comment:    Concerns today regarding 3-month history of headaches with intermittent blurred vision; without prior history of headache syndrome or migraine.  Prasanna has a family history of cerebral aneurysm: Father and uncles.  Headaches are persistent daily, moderate intensity.  New onset headaches, intermittent blurred vision and family history of cerebral aneurysm --recommended imaging-MRA.  Plan: MRA Brain ([place] [facility] [place]) wo & w Contrast          (N18.31) Stage 3a chronic kidney disease (H)  Comment:   Lab Results   Component Value Date    CR 1.35 11/22/2022    CR 1.33 07/27/2022   Plan: Stable, continue losartan.    (I10) Essential hypertension with goal blood pressure less than 140/90  Comment: Well-controlled, continue current medications.  Plan: amLODIPine (NORVASC) 10 MG tablet, losartan         (COZAAR) 25 MG tablet          (E78.5) Hyperlipidemia LDL goal <130  Comment:   Well-controlled, continue simvastatin  Lab Results   Component Value Date    LDL 93 11/22/2022    LDL 92 06/09/2020    Plan: Comprehensive metabolic panel (BMP + Alb, Alk         Phos, ALT, AST, Total. Bili, TP), Lipid panel         reflex to direct LDL Fasting, simvastatin         (ZOCOR) 40 MG tablet          (R73.03) Prediabetes  Comment:   Well-controlled, continue metformin  Lab Results   Component Value Date    A1C 5.2 03/21/2024    A1C 5.7 11/22/2022    A1C 5.8 06/09/2020    A1C 5.7 05/07/2018    Plan:  "Hemoglobin A1c, metFORMIN (GLUCOPHAGE XR) 500         MG 24 hr tablet          (M10.9) Gout, unspecified cause, unspecified chronicity, unspecified site  Comment:   Plan: Now off allopurinol without recurrent symptoms.    (D12.6) Tubular adenoma of colon  Comment: Due for follow-up colonoscopy  Plan: Colonoscopy Screening  Referral          (B35.6) Tinea cruris  Comment: History of recurrent tinea cruris, requests clotrimazole and triamcinolone refills.  Plan: clotrimazole (LOTRIMIN) 1 % external cream,         triamcinolone (KENALOG) 0.1 % external cream          (E66.01) BMI 33  Comment:   Plan: Obesity with the following comorbidities: Prediabetes, hypertension, hyperlipidemia              BMI  Estimated body mass index is 33.53 kg/m  as calculated from the following:    Height as of this encounter: 1.727 m (5' 8\").    Weight as of this encounter: 100 kg (220 lb 8 oz).       Counseling  Appropriate preventive services were discussed with this patient, including applicable screening as appropriate for fall prevention, nutrition, physical activity, Tobacco-use cessation, weight loss and cognition.  Checklist reviewing preventive services available has been given to the patient.  Reviewed patient's diet, addressing concerns and/or questions.   He is at risk for lack of exercise and has been provided with information to increase physical activity for the benefit of his well-being.   Information on urinary incontinence and treatment options given to patient.         Juanito tompkins is a 64 year old, presenting for the following:  Annual Visit (Moles on back and post prostate check) and Medicare Visit        3/21/2024     1:16 PM   Additional Questions   Roomed by Siobhan Bell   Accompanied by addi         3/21/2024     1:16 PM   Patient Reported Additional Medications   Patient reports taking the following new medications NA         Health Care Directive  Patient does not have a Health Care Directive or Living " Will: Patient states has Advance Directive and will bring in a copy to clinic.    HPI              3/18/2024   General Health   How would you rate your overall physical health? Good   Feel stress (tense, anxious, or unable to sleep) Not at all         3/18/2024   Nutrition   Diet: Regular (no restrictions)         3/18/2024   Exercise   Days per week of moderate/strenous exercise 3 days   Average minutes spent exercising at this level 20 min         3/18/2024   Social Factors   Frequency of gathering with friends or relatives More than three times a week   Worry food won't last until get money to buy more No   Food not last or not have enough money for food? No   Do you have housing?  Yes   Are you worried about losing your housing? No   Lack of transportation? No   Unable to get utilities (heat,electricity)? No         3/18/2024   Activities of Daily Living- Home Safety   Needs help with the following daily activites None of the above   Safety concerns in the home None of the above         3/18/2024   Dental   Dentist two times every year? Yes         3/18/2024   Hearing Screening   Hearing concerns? None of the above         3/18/2024   Driving Risk Screening   Patient/family members have concerns about driving No         3/18/2024   General Alertness/Fatigue Screening   Have you been more tired than usual lately? No         3/18/2024   Urinary Incontinence Screening   Bothered by leaking urine in past 6 months Yes         3/18/2024   TB Screening   Were you born outside of the [place]? No         Today's PHQ-2 Score:       3/21/2024     1:01 PM   PHQ-2 ( 1999 Pfizer)   Q1: Little interest or pleasure in doing things 0   Q2: Feeling down, depressed or hopeless 0   PHQ-2 Score 0   Q1: Little interest or pleasure in doing things Not at all   Q2: Feeling down, depressed or hopeless Not at all   PHQ-2 Score 0           3/18/2024   Substance Use   Alcohol more than 3/day or more than 7/wk Not Applicable   Do you have a  current opioid prescription? No   How severe/bad is pain from 1 to 10? 1/10   Do you use any other substances recreationally? No     Social History     Tobacco Use    Smoking status: Former     Types: Cigarettes    Smokeless tobacco: Never   Substance Use Topics    Alcohol use: No     Alcohol/week: 0.0 standard drinks of alcohol    Drug use: No           3/18/2024   AAA Screening   Family history of Abdominal Aortic Aneurysm (AAA)? No   Last PSA:   PSA   Date Value Ref Range Status   06/09/2020 <0.01 0 - 4 ug/L Final     Comment:     Assay Method:  Chemiluminescence using Siemens Vista analyzer     PSA Tumor Marker   Date Value Ref Range Status   11/22/2022 <0.01 0.00 - 4.50 ng/mL Final   09/30/2021 <0.01 0.00 - 4.00 ug/L Final     ASCVD Risk   The 10-year ASCVD risk score (Jose RAMIREZ, et al., 2019) is: 14.3%    Values used to calculate the score:      Age: 64 years      Sex: Male      Is Non- : No      Diabetic: No      Tobacco smoker: No      Systolic Blood Pressure: 132 mmHg      Is BP treated: Yes      HDL Cholesterol: 53 mg/dL      Total Cholesterol: 165 mg/dL        Reviewed and updated as needed this visit by Provider                  VISION   No corrective lenses  Tool used: DEVONTE   Right eye:        10/16 (20/32)   Left eye:          10/20 (20/40)  Visual Acuity: Pass       Patient Active Problem List   Diagnosis    Hyperlipidemia LDL goal <130    Hepatic steatosis    Gilbert's disease    Prediabetes    CKD (chronic kidney disease) stage 3, GFR 30-59 ml/min (H)    Essential hypertension with goal blood pressure less than 140/90    Gout, unspecified cause, unspecified chronicity, unspecified site    Gastroesophageal reflux disease without esophagitis    Class 1 obesity due to excess calories with serious comorbidity and body mass index (BMI) of 34.0 to 34.9 in adult    Prostate cancer (H)    Stress incontinence of urine    Calculus of gallbladder without cholecystitis    Morbid  obesity (H)    Tubular adenoma of colon     Past Surgical History:   Procedure Laterality Date    BIOPSY PROSTATE TRANSRECTAL  2017    bx showed BPH, negative for prostate cancer    DAVINCI PROSTATECTOMY  02/2018    LASIK  2006    repair right hydronephrosis  Age 14       Social History     Tobacco Use    Smoking status: Former     Types: Cigarettes    Smokeless tobacco: Never   Substance Use Topics    Alcohol use: No     Alcohol/week: 0.0 standard drinks of alcohol     Family History   Problem Relation Age of Onset    C.A.D. Brother         stent, age 56    C.A.D. Maternal Uncle         MI in 50's    Hypertension Father     Hypertension Mother     Cancer - colorectal No family hx of     Prostate Cancer No family hx of          Current Outpatient Medications   Medication Sig Dispense Refill    amLODIPine (NORVASC) 10 MG tablet Take 1 tablet (10 mg) by mouth daily 90 tablet 11    clotrimazole (LOTRIMIN) 1 % external cream Apply topically 2 times daily as needed (rash) 45 g 3    losartan (COZAAR) 25 MG tablet Take 1 tablet (25 mg) by mouth daily 90 tablet 11    metFORMIN (GLUCOPHAGE XR) 500 MG 24 hr tablet TAKE FOUR TABLETS BY MOUTH EVERY  tablet 3    simvastatin (ZOCOR) 40 MG tablet TAKE ONE TABLET BY MOUTH AT BEDTIME 90 tablet 11    triamcinolone (KENALOG) 0.1 % external cream Apply topically 2 times daily as needed for irritation 45 g 3    omeprazole (PRILOSEC) 20 MG DR capsule TAKE 1 CAPSULE BY MOUTH DAILY AS NEEDED 90 capsule 2     Current providers sharing in care for this patient include:  Patient Care Team:  Enrico Reagan MD as PCP - General  Enrico Reagan MD as Assigned PCP    The following health maintenance items are reviewed in Epic and correct as of today:  Health Maintenance   Topic Date Due    RSV VACCINE (Pregnancy & 60+) (1 - 1-dose 60+ series) Never done    LUNG CANCER SCREENING  02/08/2019    ANNUAL REVIEW OF HM ORDERS  09/30/2022    Pneumococcal Vaccine: 65+ Years (2 of 2 -  "PCV) 03/11/2023    MICROALBUMIN  07/27/2023    HEMOGLOBIN  07/27/2023    COVID-19 Vaccine (3 - 2023-24 season) 09/01/2023    BMP  11/22/2023    LIPID  11/22/2023    COLORECTAL CANCER SCREENING  08/07/2024    MEDICARE ANNUAL WELLNESS VISIT  03/21/2025    FALL RISK ASSESSMENT  03/21/2025    GLUCOSE  11/22/2025    ADVANCE CARE PLANNING  09/30/2026    DTAP/TDAP/TD IMMUNIZATION (3 - Td or Tdap) 06/09/2030    HEPATITIS C SCREENING  Completed    PHQ-2 (once per calendar year)  Completed    INFLUENZA VACCINE  Completed    URINALYSIS  Completed    ZOSTER IMMUNIZATION  Completed    AORTIC ANEURYSM SCREENING (SYSTEM ASSIGNED)  Completed    IPV IMMUNIZATION  Aged Out    HPV IMMUNIZATION  Aged Out    MENINGITIS IMMUNIZATION  Aged Out    RSV MONOCLONAL ANTIBODY  Aged Out         Review of Systems  Constitutional, neuro, ENT, endocrine, pulmonary, cardiac, gastrointestinal, genitourinary, musculoskeletal, integument and psychiatric systems are negative, except as otherwise noted.     Objective    Exam  /85 (BP Location: Right arm, Patient Position: Sitting, Cuff Size: Adult Large)   Pulse 81   Temp 98  F (36.7  C) (Oral)   Resp 18   Ht 1.727 m (5' 8\")   Wt 100 kg (220 lb 8 oz)   SpO2 98%   BMI 33.53 kg/m     Estimated body mass index is 33.53 kg/m  as calculated from the following:    Height as of this encounter: 1.727 m (5' 8\").    Weight as of this encounter: 100 kg (220 lb 8 oz).    Physical Exam  GENERAL: alert and no distress  EYES: Eyes grossly normal to inspection, PERRL and conjunctivae and sclerae normal  HENT: ear canals and TM's normal, nose and mouth without ulcers or lesions  NECK: no adenopathy, no asymmetry, masses, or scars  RESP: lungs clear to auscultation - no rales, rhonchi or wheezes  CV: regular rate and rhythm, normal S1 S2, no S3 or S4, no murmur, click or rub, no peripheral edema  ABDOMEN: soft, nontender, no hepatosplenomegaly, no masses and bowel sounds normal  MS: no gross musculoskeletal " defects noted, no edema  SKIN: no suspicious lesions or rashes  NEURO: motor exam, sensory exam grossly normal  PSYCH: mentation appears normal, affect normal/bright        3/21/2024   Mini Cog   Clock Draw Score 2 Normal   3 Item Recall 3 objects recalled   Mini Cog Total Score 5             3/21/2024   Vision Screen   Vision Screen Results Pass       Signed Electronically by: Enrico Reagan MD

## 2024-03-21 NOTE — PATIENT INSTRUCTIONS
Preventive Care Advice   This is general advice given by our system to help you stay healthy. However, your care team may have specific advice just for you. Please talk to your care team about your preventive care needs.  Nutrition  Eat 5 or more servings of fruits and vegetables each day.  Try wheat bread, brown rice and whole grain pasta (instead of white bread, rice, and pasta).  Get enough calcium and vitamin D. Check the label on foods and aim for 100% of the RDA (recommended daily allowance).  Lifestyle  Exercise at least 150 minutes each week   (30 minutes a day, 5 days a week).  Do muscle strengthening activities 2 days a week. These help control your weight and prevent disease.  No smoking.  Wear sunscreen to prevent skin cancer.  Have a dental exam and cleaning every 6 months.  Yearly exams  See your health care team every year to talk about:  Any changes in your health.  Any medicines your care team has prescribed.  Preventive care, family planning, and ways to prevent chronic diseases.  Shots (vaccines)   HPV shots (up to age 26), if you've never had them before.  Hepatitis B shots (up to age 59), if you've never had them before.  COVID-19 shot: Get this shot when it's due.  Flu shot: Get a flu shot every year.  Tetanus shot: Get a tetanus shot every 10 years.  Pneumococcal, hepatitis A, and RSV shots: Ask your care team if you need these based on your risk.  Shingles shot (for age 50 and up).  General health tests  Diabetes screening:  Starting at age 35, Get screened for diabetes at least every 3 years.  If you are younger than age 35, ask your care team if you should be screened for diabetes.  Cholesterol test: At age 39, start having a cholesterol test every 5 years, or more often if advised.  Bone density scan (DEXA): At age 50, ask your care team if you should have this scan for osteoporosis (brittle bones).  Hepatitis C: Get tested at least once in your life.  STIs (sexually transmitted  infections)  Before age 24: Ask your care team if you should be screened for STIs.  After age 24: Get screened for STIs if you're at risk. You are at risk for STIs (including HIV) if:  You are sexually active with more than one person.  You don't use condoms every time.  You or a partner was diagnosed with a sexually transmitted infection.  If you are at risk for HIV, ask about PrEP medicine to prevent HIV.  Get tested for HIV at least once in your life, whether you are at risk for HIV or not.  Cancer screening tests  Cervical cancer screening: If you have a cervix, begin getting regular cervical cancer screening tests at age 21. Most people who have regular screenings with normal results can stop after age 65. Talk about this with your provider.  Breast cancer scan (mammogram): If you've ever had breasts, begin having regular mammograms starting at age 40. This is a scan to check for breast cancer.  Colon cancer screening: It is important to start screening for colon cancer at age 45.  Have a colonoscopy test every 10 years (or more often if you're at risk) Or, ask your provider about stool tests like a FIT test every year or Cologuard test every 3 years.  To learn more about your testing options, visit: https://www.[employer]/900327.pdf.  For help making a decision, visit: https://bit.ly/qu98952.  Prostate cancer screening test: If you have a prostate and are age 55 to 69, ask your provider if you would benefit from a yearly prostate cancer screening test.  Lung cancer screening: If you are a current or former smoker age 50 to 80, ask your care team if ongoing lung cancer screenings are right for you.  For informational purposes only. Not to replace the advice of your health care provider. Copyright   2023 [employer]. All rights reserved. Clinically reviewed by the [facility] Transitions Program. [employer] 484332 - REV 01/24.

## 2024-03-22 LAB
ALBUMIN SERPL BCG-MCNC: 4.7 G/DL (ref 3.5–5.2)
ALP SERPL-CCNC: 96 U/L (ref 40–150)
ALT SERPL W P-5'-P-CCNC: 67 U/L (ref 0–70)
ANION GAP SERPL CALCULATED.3IONS-SCNC: 11 MMOL/L (ref 7–15)
AST SERPL W P-5'-P-CCNC: 47 U/L (ref 0–45)
BILIRUB SERPL-MCNC: 1.5 MG/DL
BUN SERPL-MCNC: 18.2 MG/DL (ref 8–23)
CALCIUM SERPL-MCNC: 9.5 MG/DL (ref 8.8–10.2)
CHLORIDE SERPL-SCNC: 105 MMOL/L (ref 98–107)
CHOLEST SERPL-MCNC: 188 MG/DL
CREAT SERPL-MCNC: 1.36 MG/DL (ref 0.67–1.17)
DEPRECATED HCO3 PLAS-SCNC: 23 MMOL/L (ref 22–29)
EGFRCR SERPLBLD CKD-EPI 2021: 57 ML/MIN/1.73M2
FASTING STATUS PATIENT QL REPORTED: ABNORMAL
GLUCOSE SERPL-MCNC: 111 MG/DL (ref 70–99)
HDLC SERPL-MCNC: 53 MG/DL
LDLC SERPL CALC-MCNC: 103 MG/DL
NONHDLC SERPL-MCNC: 135 MG/DL
POTASSIUM SERPL-SCNC: 4.3 MMOL/L (ref 3.4–5.3)
PROT SERPL-MCNC: 7.7 G/DL (ref 6.4–8.3)
PSA SERPL DL<=0.01 NG/ML-MCNC: <0.01 NG/ML (ref 0–4.5)
SODIUM SERPL-SCNC: 139 MMOL/L (ref 135–145)
TRIGL SERPL-MCNC: 162 MG/DL

## 2024-03-23 ENCOUNTER — MYC REFILL (OUTPATIENT)
Dept: PEDIATRICS | Facility: CLINIC | Age: 64
End: 2024-03-23
Payer: MEDICARE

## 2024-03-23 DIAGNOSIS — B35.6 TINEA CRURIS: ICD-10-CM

## 2024-03-23 RX ORDER — CLOTRIMAZOLE 1 %
CREAM (GRAM) TOPICAL 2 TIMES DAILY PRN
Qty: 45 G | Refills: 3 | Status: CANCELLED | OUTPATIENT
Start: 2024-03-23

## 2024-03-25 RX ORDER — CLOTRIMAZOLE 1 %
CREAM (GRAM) TOPICAL 2 TIMES DAILY PRN
Qty: 45 G | Refills: 3 | Status: SHIPPED | OUTPATIENT
Start: 2024-03-25

## 2024-03-29 ENCOUNTER — TELEPHONE (OUTPATIENT)
Dept: GASTROENTEROLOGY | Facility: CLINIC | Age: 64
End: 2024-03-29
Payer: MEDICARE

## 2024-03-29 DIAGNOSIS — Z12.11 SPECIAL SCREENING FOR MALIGNANT NEOPLASMS, COLON: Primary | ICD-10-CM

## 2024-03-29 NOTE — TELEPHONE ENCOUNTER
"Endoscopy Scheduling Screen    Have you had a positive Covid test in the last 14 days?  No    What is your communication preference for Instructions and/or Bowel Prep?   MyChart    What insurance is in the chart?  Other:  BCBS    Ordering/Referring Provider:     ROXY BATES      (If ordering provider performs procedure, schedule with ordering provider unless otherwise instructed. )    BMI: Estimated body mass index is 33.53 kg/m  as calculated from the following:    Height as of 3/21/24: 1.727 m (5' 8\").    Weight as of 3/21/24: 100 kg (220 lb 8 oz).     Sedation Ordered  moderate sedation.   If patient BMI > 50 do not schedule in [facility].    If patient BMI > 45 do not schedule at [facility].    Are you taking methadone or Suboxone?  No    Have you had difficulties, pain, or discomfort during past endoscopy procedures?  No    Are you taking any prescription medications for pain 3 or more times per week?   NO, No RN review required.    Do you have a history of malignant hyperthermia?  No    (Females) Are you currently pregnant?        Have you been diagnosed or told you have pulmonary hypertension?   No    Do you have an LVAD?  No    Have you been told you have moderate to severe sleep apnea?  No    Have you been told you have COPD, asthma, or any other lung disease?  No    Do you have any heart conditions?  No     Have you ever had or are you waiting for an organ transplant?  No. Continue scheduling, no site restrictions.    Have you had a stroke or transient ischemic attack (TIA aka \"mini stroke\" in the last 6 months?   No    Have you been diagnosed with or been told you have cirrhosis of the liver?   No    Are you currently on dialysis?   No    Do you need assistance transferring?   No    BMI: Estimated body mass index is 33.53 kg/m  as calculated from the following:    Height as of 3/21/24: 1.727 m (5' 8\").    Weight as of 3/21/24: 100 kg (220 lb 8 oz).     Is patients BMI > 40 and scheduling location " [facility]?  No    Do you take an injectable medication for weight loss or diabetes (excluding insulin)?  No    Do you take the medication Naltrexone?  No    Do you take blood thinners?  No       Prep   Are you currently on dialysis or do you have chronic kidney disease?  No    Do you have a diagnosis of diabetes?  No    Do you have a diagnosis of cystic fibrosis (CF)?  No    On a regular basis do you go 3 -5 days between bowel movements?  No    BMI > 40?  No    Preferred Pharmacy:    [id number] [place], [place] - [address]   [address] [facility] [address]  Phone: [phone] Fax: [phone]      Final Scheduling Details     Procedure scheduled  Colonoscopy    Surgeon:  YUNG     Date of procedure:  5/16     Pre-OP / PAC:   No - Not required for this site.    Location  [facility] - Per order.    Sedation   Moderate Sedation - Per order.      Patient Reminders:   You will receive a call from a Nurse to review instructions and health history.  This assessment must be completed prior to your procedure.  Failure to complete the Nurse assessment may result in the procedure being cancelled.      On the day of your procedure, please designate an adult(s) who can drive you home stay with you for the next 24 hours. The medicines used in the exam will make you sleepy. You will not be able to drive.      You cannot take public transportation, ride share services, or non-medical taxi service without a responsible caregiver.  Medical transport services are allowed with the requirement that a responsible caregiver will receive you at your destination.  We require that drivers and caregivers are confirmed prior to your procedure.

## 2024-04-15 ENCOUNTER — HOSPITAL ENCOUNTER (OUTPATIENT)
Dept: MRI IMAGING | Facility: CLINIC | Age: 64
Discharge: HOME | End: 2024-04-15
Attending: INTERNAL MEDICINE | Admitting: INTERNAL MEDICINE
Payer: MEDICARE

## 2024-04-15 DIAGNOSIS — R29.818 HEADACHE WITH NEUROLOGIC DEFICIT: ICD-10-CM

## 2024-04-15 DIAGNOSIS — H53.8 BLURRED VISION: ICD-10-CM

## 2024-04-15 DIAGNOSIS — Z82.49 FAMILY HISTORY OF CEREBRAL ANEURYSM: ICD-10-CM

## 2024-04-15 DIAGNOSIS — R51.9 HEADACHE WITH NEUROLOGIC DEFICIT: ICD-10-CM

## 2024-04-15 PROCEDURE — 70544 MR ANGIOGRAPHY HEAD W/O DYE: CPT | Mod: MA

## 2024-04-18 ENCOUNTER — OFFICE VISIT (OUTPATIENT)
Dept: URGENT CARE | Facility: URGENT CARE | Age: 64
End: 2024-04-18
Payer: MEDICARE

## 2024-04-18 VITALS
OXYGEN SATURATION: 97 % | DIASTOLIC BLOOD PRESSURE: 88 MMHG | SYSTOLIC BLOOD PRESSURE: 134 MMHG | RESPIRATION RATE: 16 BRPM | HEART RATE: 79 BPM | TEMPERATURE: 97 F

## 2024-04-18 DIAGNOSIS — S05.02XA ABRASION OF LEFT CORNEA, INITIAL ENCOUNTER: Primary | ICD-10-CM

## 2024-04-18 PROCEDURE — 99213 OFFICE O/P EST LOW 20 MIN: CPT | Performed by: PHYSICIAN ASSISTANT

## 2024-04-18 RX ORDER — POLYMYXIN B SULFATE AND TRIMETHOPRIM 1; 10000 MG/ML; [USP'U]/ML
1-2 SOLUTION OPHTHALMIC EVERY 4 HOURS
Qty: 10 ML | Refills: 0 | Status: SHIPPED | OUTPATIENT
Start: 2024-04-18 | End: 2024-04-25

## 2024-04-18 RX ORDER — BISACODYL 5 MG/1
TABLET, DELAYED RELEASE ORAL
Qty: 4 TABLET | Refills: 0 | Status: ON HOLD | OUTPATIENT
Start: 2024-04-18 | End: 2024-05-16

## 2024-04-18 RX ORDER — DICLOFENAC SODIUM 1 MG/ML
1 SOLUTION/ DROPS OPHTHALMIC 4 TIMES DAILY
Qty: 5 ML | Refills: 0 | Status: SHIPPED | OUTPATIENT
Start: 2024-04-18 | End: 2024-04-28

## 2024-04-18 NOTE — PROGRESS NOTES
Abrasion of left cornea, initial encounter  - diclofenac (VOLTAREN) 0.1 % ophthalmic solution; Place 1 drop Into the left eye 4 times daily for 10 days  - polymixin b-trimethoprim (POLYTRIM) 38375-3.1 UNIT/ML-% ophthalmic solution; Place 1-2 drops Into the left eye every 4 hours for 7 days  - Adult Eye  Referral; Future    I like the patient to have a follow-up with ophthalmology in the next 5 days for further evaluation.       Corneal Scratches: Care Instructions  Your Care Instructions     The cornea is the clear surface that covers the front of the eye. When a speck of dirt, a wood chip, an insect, or another object flies into your eye, it can cause a painful scratch on the cornea. Wearing contact lenses too long or rubbing your eyes can also scratch the cornea. Small scratches usually heal in a day or two. Deeper scratches may take longer.  If you have had a foreign object removed from your eye or you have a corneal scratch, you will need to watch for infection and vision problems while your eye heals.  Follow-up care is a key part of your treatment and safety. Be sure to make and go to all appointments, and call your doctor if you are having problems. It's also a good idea to know your test results and keep a list of the medicines you take.  How can you care for yourself at home?  The doctor probably used a medicine during your exam to numb your eye. When it wears off in 30 to 60 minutes, your eye pain may come back. Take pain medicines exactly as directed.  If the doctor gave you a prescription medicine for pain, take it as prescribed.  If you are not taking a prescription pain medicine, ask your doctor if you can take an over-the-counter medicine.  Do not take two or more pain medicines at the same time unless the doctor told you to. Many pain medicines have acetaminophen, which is Tylenol. Too much acetaminophen (Tylenol) can be harmful.  Do not rub your injured eye. Rubbing can make it  "worse.  Use the prescribed eyedrops or ointment as directed. Be sure the dropper or bottle tip is clean. To put in eyedrops or ointment:  Tilt your head back, and pull your lower eyelid down with one finger.  Drop or squirt the medicine inside the lower lid.  Close your eye for 30 to 60 seconds to let the drops or ointment move around.  Do not touch the ointment or dropper tip to your eyelashes or any other surface.  Do not use your contact lens in your hurt eye until your doctor says you can. Also, do not wear eye makeup until your eye has healed.  Do not drive if you have blurred vision.  Bright light may hurt. Sunglasses can help.  To prevent eye injuries in the future, wear safety glasses or goggles when you work with machines or tools, mow the lawn, or ride a bike or motorcycle.  When should you call for help?   Call your doctor now or seek immediate medical care if:    You have signs of an eye infection, such as:  Pus or thick discharge coming from the eye.  Redness or swelling around the eye.  A fever.     You have new or worse eye pain.     You have vision changes.     It feels like there is something in your eye.     Light hurts your eye.   Watch closely for changes in your health, and be sure to contact your doctor if:    You do not get better as expected.   Where can you learn more?  Go to https://www.[employer].net/patiented  Enter G403 in the search box to learn more about \"Corneal Scratches: Care Instructions.\"  Current as of: June 5, 2023               Content Version: 14.0    2659-6009 [employer].   Care instructions adapted under license by your healthcare professional. If you have questions about a medical condition or this instruction, always ask your healthcare professional. [employer] disclaims any warranty or liability for your use of this information.             Patient was advised to return to clinic for reevaluation (either UC or PCP) if symptoms do not improve " in 7 days. Patient educated on red flag symptoms and asked to go directly to the ED if these symptoms present themselves.     [facility]    Subjective   64 year old who presents to clinic today for the following health issues:    Eye Problem       HPI     Eye(s) Problem  Onset/Duration: Yesterday, left eye, feels like something is in there, irritation, burning  Description:   Location: Left eye  Pain: YES  Redness: YES  Accompanying Signs & Symptoms:  Discharge/mattering: No  Swelling: No  Visual changes: Blurry   Fever: No  Nasal Congestion: No  Bothered by bright lights: No  History:  Trauma: YES  Foreign body exposure: YES  Wearing contacts: No  Precipitating or alleviating factors: None  Therapies tried and outcome: Rinsing eyes     Review of Systems   Review of Systems   See HPI    Objective    Temp: 97  F (36.1  C)   BP: 134/88 Pulse: 79   Resp: 16 SpO2: 97 %       Physical Exam   Physical Exam  Constitutional:       General: He is not in acute distress.     Appearance: Normal appearance. He is normal weight. He is not ill-appearing, toxic-appearing or diaphoretic.   HENT:      Head: Normocephalic and atraumatic.   Eyes:      General:         Right eye: No discharge.         Left eye: No discharge.      Extraocular Movements: Extraocular movements intact.      Pupils: Pupils are equal, round, and reactive to light.      Left eye: Corneal abrasion and fluorescein uptake present.     Cardiovascular:      Rate and Rhythm: Normal rate.      Pulses: Normal pulses.   Pulmonary:      Effort: Pulmonary effort is normal. No respiratory distress.   Neurological:      General: No focal deficit present.      Mental Status: He is alert and oriented to person, place, and time. Mental status is at baseline.      Gait: Gait normal.   Psychiatric:         Mood and Affect: Mood normal.         Behavior: Behavior normal.         Thought Content: Thought content normal.         Judgment:  Judgment normal.          No results found for this or any previous visit (from the past 24 hour(s)).

## 2024-04-18 NOTE — TELEPHONE ENCOUNTER
Hx CKD on problem list   Standard Golytely Bowel Prep. Instructions were sent via [employer]. Bowel prep was sent 4/18/2024 to    [id number] - [place], [place] - [address]

## 2024-04-22 DIAGNOSIS — H53.8 BLURRY VISION, BILATERAL: Primary | ICD-10-CM

## 2024-04-22 DIAGNOSIS — Z03.89 ENCOUNTER FOR OBSERVATION FOR OTHER SUSPECTED DISEASES AND CONDITIONS RULED OUT: ICD-10-CM

## 2024-05-04 ENCOUNTER — TELEPHONE (OUTPATIENT)
Dept: GASTROENTEROLOGY | Facility: CLINIC | Age: 64
End: 2024-05-04
Payer: MEDICARE

## 2024-05-04 NOTE — TELEPHONE ENCOUNTER
Pre visit planning completed.      Procedure details:    Patient scheduled for Colonoscopy  on 05.16.2024.     Arrival time: 1130. Procedure time 1215    Facility location: [facility]; [address] [place] [id number]. Check in location: Main entrance at . Come through the roundabout underneath the awning (not the ER entrance).    Sedation type: Conscious sedation     Pre op exam needed? N/A    Indication for procedure: Tubular adenoma of colon       Chart review:     Electronic implanted devices? No    Recent diagnosis of diverticulitis within the last 6 weeks? No    Diabetic? Yes. Diabetic medication HOLDING recommendations: Oral diabetic medications: Yes:  Metformin (glucophage): HOLD day of procedure.       Medication review:    Anticoagulants? No    NSAIDS? No NSAID medications per patient's medication list.  RN will verify with pre-assessment call.    Other medication HOLDING recommendations:  N/A      Prep for procedure:     Bowel prep recommendation: Standard Golytely. Bowel prep prescription sent to    [id number] [place], [place] - [address]  Due to: CKD noted.  and diabetes.     Prep instructions sent via [employer]         Jennifer Cruz RN  Endoscopy Procedure Pre Assessment RN  [phone] option 4

## 2024-05-07 NOTE — TELEPHONE ENCOUNTER
Attempted to contact patient in order to complete pre assessment questions.     No answer. Left message to return call to [phone] option 4    Callback required communication sent via [employer].      Jennifer Cruz RN  Endoscopy Procedure Pre Assessment RN

## 2024-05-08 NOTE — TELEPHONE ENCOUNTER
Pre assessment completed for upcoming procedure.   (Please see previous telephone encounter notes for complete details)    Patient  returned call.       Procedure details:    Arrival time and facility location reviewed.    Pre op exam needed? N/A    Designated  policy reviewed. Instructed to have someone stay 6  hours post procedure.       Medication review:    Medications reviewed. Please see supporting documentation below. Holding recommendations discussed (if applicable).       Prep for procedure:     Procedure prep instructions reviewed.        Any additional information needed:  N/A      Patient  verbalized understanding and had no questions or concerns at this time.      Mariela Kraus RN  Endoscopy Procedure Pre Assessment RN  [phone] option 4

## 2024-05-16 ENCOUNTER — HOSPITAL ENCOUNTER (OUTPATIENT)
Facility: CLINIC | Age: 64
Discharge: HOME | End: 2024-05-16
Attending: INTERNAL MEDICINE | Admitting: INTERNAL MEDICINE
Payer: MEDICARE

## 2024-05-16 VITALS
HEIGHT: 68 IN | SYSTOLIC BLOOD PRESSURE: 128 MMHG | HEART RATE: 72 BPM | BODY MASS INDEX: 33.34 KG/M2 | OXYGEN SATURATION: 92 % | DIASTOLIC BLOOD PRESSURE: 96 MMHG | WEIGHT: 220 LBS | RESPIRATION RATE: 16 BRPM

## 2024-05-16 LAB — COLONOSCOPY: NORMAL

## 2024-05-16 PROCEDURE — G0105 COLORECTAL SCRN; HI RISK IND: HCPCS | Performed by: INTERNAL MEDICINE

## 2024-05-16 PROCEDURE — 250N000011 HC RX IP 250 OP 636: Performed by: INTERNAL MEDICINE

## 2024-05-16 PROCEDURE — G0500 MOD SEDAT ENDO SERVICE >5YRS: HCPCS | Performed by: INTERNAL MEDICINE

## 2024-05-16 PROCEDURE — 45378 DIAGNOSTIC COLONOSCOPY: CPT | Performed by: INTERNAL MEDICINE

## 2024-05-16 RX ORDER — PROCHLORPERAZINE MALEATE 5 MG
5 TABLET ORAL EVERY 6 HOURS PRN
Status: CANCELLED | OUTPATIENT
Start: 2024-05-16

## 2024-05-16 RX ORDER — FLUMAZENIL 0.1 MG/ML
0.2 INJECTION, SOLUTION INTRAVENOUS
Status: DISCONTINUED | OUTPATIENT
Start: 2024-05-16 | End: 2024-05-16 | Stop reason: HOSPADM

## 2024-05-16 RX ORDER — LIDOCAINE 40 MG/G
CREAM TOPICAL
Status: DISCONTINUED | OUTPATIENT
Start: 2024-05-16 | End: 2024-05-16 | Stop reason: HOSPADM

## 2024-05-16 RX ORDER — SIMETHICONE 40MG/0.6ML
133 SUSPENSION, DROPS(FINAL DOSAGE FORM)(ML) ORAL
Status: DISCONTINUED | OUTPATIENT
Start: 2024-05-16 | End: 2024-05-16 | Stop reason: HOSPADM

## 2024-05-16 RX ORDER — FENTANYL CITRATE 50 UG/ML
50-100 INJECTION, SOLUTION INTRAMUSCULAR; INTRAVENOUS EVERY 5 MIN PRN
Status: DISCONTINUED | OUTPATIENT
Start: 2024-05-16 | End: 2024-05-16 | Stop reason: HOSPADM

## 2024-05-16 RX ORDER — ATROPINE SULFATE 0.1 MG/ML
1 INJECTION INTRAVENOUS
Status: DISCONTINUED | OUTPATIENT
Start: 2024-05-16 | End: 2024-05-16 | Stop reason: HOSPADM

## 2024-05-16 RX ORDER — ONDANSETRON 2 MG/ML
4 INJECTION INTRAMUSCULAR; INTRAVENOUS EVERY 6 HOURS PRN
Status: CANCELLED | OUTPATIENT
Start: 2024-05-16

## 2024-05-16 RX ORDER — ONDANSETRON 2 MG/ML
4 INJECTION INTRAMUSCULAR; INTRAVENOUS
Status: DISCONTINUED | OUTPATIENT
Start: 2024-05-16 | End: 2024-05-16 | Stop reason: HOSPADM

## 2024-05-16 RX ORDER — NALOXONE HYDROCHLORIDE 0.4 MG/ML
0.4 INJECTION, SOLUTION INTRAMUSCULAR; INTRAVENOUS; SUBCUTANEOUS
Status: DISCONTINUED | OUTPATIENT
Start: 2024-05-16 | End: 2024-05-16 | Stop reason: HOSPADM

## 2024-05-16 RX ORDER — NALOXONE HYDROCHLORIDE 0.4 MG/ML
0.2 INJECTION, SOLUTION INTRAMUSCULAR; INTRAVENOUS; SUBCUTANEOUS
Status: DISCONTINUED | OUTPATIENT
Start: 2024-05-16 | End: 2024-05-16 | Stop reason: HOSPADM

## 2024-05-16 RX ORDER — DIPHENHYDRAMINE HYDROCHLORIDE 50 MG/ML
25-50 INJECTION INTRAMUSCULAR; INTRAVENOUS
Status: DISCONTINUED | OUTPATIENT
Start: 2024-05-16 | End: 2024-05-16 | Stop reason: HOSPADM

## 2024-05-16 RX ORDER — ONDANSETRON 4 MG/1
4 TABLET, ORALLY DISINTEGRATING ORAL EVERY 6 HOURS PRN
Status: CANCELLED | OUTPATIENT
Start: 2024-05-16

## 2024-05-16 RX ORDER — FLUMAZENIL 0.1 MG/ML
0.2 INJECTION, SOLUTION INTRAVENOUS
Status: CANCELLED | OUTPATIENT
Start: 2024-05-16 | End: 2024-05-17

## 2024-05-16 RX ORDER — EPINEPHRINE 1 MG/ML
0.1 INJECTION, SOLUTION INTRAMUSCULAR; SUBCUTANEOUS
Status: DISCONTINUED | OUTPATIENT
Start: 2024-05-16 | End: 2024-05-16 | Stop reason: HOSPADM

## 2024-05-16 RX ADMIN — FENTANYL CITRATE 100 MCG: 50 INJECTION, SOLUTION INTRAMUSCULAR; INTRAVENOUS at 12:14

## 2024-05-16 RX ADMIN — MIDAZOLAM 2 MG: 1 INJECTION INTRAMUSCULAR; INTRAVENOUS at 12:15

## 2024-05-16 ASSESSMENT — ACTIVITIES OF DAILY LIVING (ADL)
ADLS_ACUITY_SCORE: 35

## 2024-05-16 NOTE — H&P
Pre-Endoscopy History and Physical     Yrn Will MRN# [id number]   YOB: 1960 Age: 64 year old     Date of Procedure: 5/16/2024  Primary care provider: Enrico Reagan  Type of Endoscopy: Colonoscopy with possible biopsy, possible polypectomy  Reason for Procedure: polyp  Type of Anesthesia Anticipated: Conscious Sedation    HPI:    Yrn is a 64 year old male who will be undergoing the above procedure.      A history and physical has been performed. The patient's medications and allergies have been reviewed. The risks and benefits of the procedure and the sedation options and risks were discussed with the patient.  All questions were answered and informed consent was obtained.      He denies a personal or family history of anesthesia complications or bleeding disorders.     Patient Active Problem List   Diagnosis    Hyperlipidemia LDL goal <130    Hepatic steatosis    Gilbert's disease    Prediabetes    CKD (chronic kidney disease) stage 3, GFR 30-59 ml/min (H)    Essential hypertension with goal blood pressure less than 140/90    Gout, unspecified cause, unspecified chronicity, unspecified site    Gastroesophageal reflux disease without esophagitis    Class 1 obesity due to excess calories with serious comorbidity and body mass index (BMI) of 34.0 to 34.9 in adult    Prostate cancer (H)    Stress incontinence of urine    Calculus of gallbladder without cholecystitis    Morbid obesity (H)    Tubular adenoma of colon        Past Medical History:   Diagnosis Date    Esophageal reflux 9/16/2009    Gilbert's disease     Gout 9/16/2009    HTN (hypertension) 9/16/2009    Hyperlipidemia LDL goal <130 9/16/2009        Past Surgical History:   Procedure Laterality Date    BIOPSY PROSTATE TRANSRECTAL  2017    bx showed BPH, negative for prostate cancer    DAVINCI PROSTATECTOMY  02/2018    LASIK  2006    repair right hydronephrosis  Age 14       Social History     Tobacco Use    Smoking status: Former  "    Types: Cigarettes    Smokeless tobacco: Never   Substance Use Topics    Alcohol use: No     Alcohol/week: 0.0 standard drinks of alcohol       Family History   Problem Relation Age of Onset    Colon Cancer Mother     Hypertension Mother     Hypertension Father     C.A.D. Brother         perez, age 56    C.A.D. Maternal Uncle         MI in 50's       Prior to Admission medications    Medication Sig Start Date End Date Taking? Authorizing Provider   amLODIPine (NORVASC) 10 MG tablet Take 1 tablet (10 mg) by mouth daily 3/21/24  Yes Enrico Reagan MD   losartan (COZAAR) 25 MG tablet Take 1 tablet (25 mg) by mouth daily 3/21/24  Yes Enrico Reagan MD   metFORMIN (GLUCOPHAGE XR) 500 MG 24 hr tablet TAKE FOUR TABLETS BY MOUTH EVERY DAY 3/21/24  Yes Enrico Reagan MD   clotrimazole (LOTRIMIN) 1 % external cream Apply topically 2 times daily as needed (rash) 3/25/24   Enrico Reagan MD   omeprazole (PRILOSEC) 20 MG DR capsule TAKE 1 CAPSULE BY MOUTH DAILY AS NEEDED 3/22/24   Enrico Reagan MD   simvastatin (ZOCOR) 40 MG tablet TAKE ONE TABLET BY MOUTH AT BEDTIME 3/21/24   Enrico Reagan MD   triamcinolone (KENALOG) 0.1 % external cream Apply topically 2 times daily as needed for irritation 3/21/24   Enrico Reagan MD       No Known Allergies     REVIEW OF SYSTEMS:   5 point ROS negative except as noted above in HPI, including Gen., Resp., CV, GI &  system review.    PHYSICAL EXAM:   There were no vitals taken for this visit. Estimated body mass index is 33.53 kg/m  as calculated from the following:    Height as of 3/21/24: 1.727 m (5' 8\").    Weight as of 3/21/24: 100 kg (220 lb 8 oz).   GENERAL APPEARANCE: alert, and oriented  MENTAL STATUS: alert  AIRWAY EXAM: Mallampatti Class I (visualization of the soft palate, fauces, uvula, anterior and posterior pillars)  RESP: lungs clear to auscultation - no rales, rhonchi or wheezes  CV: regular rates and rhythm  DIAGNOSTICS:    Not " indicated    IMPRESSION   ASA Class 2 - Mild systemic disease    PLAN:   Plan for Colonoscopy with possible biopsy, possible polypectomy. We discussed the risks, benefits and alternatives and the patient wished to proceed.    The above has been forwarded to the consulting provider.      Signed Electronically by: Enrico Hernandez MD  May 16, 2024

## 2024-12-07 DIAGNOSIS — K21.9 GASTROESOPHAGEAL REFLUX DISEASE WITHOUT ESOPHAGITIS: ICD-10-CM

## 2025-01-21 ENCOUNTER — TELEPHONE (OUTPATIENT)
Dept: DERMATOLOGY | Facility: CLINIC | Age: 65
End: 2025-01-21
Payer: MEDICARE

## 2025-01-21 ENCOUNTER — OFFICE VISIT (OUTPATIENT)
Dept: URGENT CARE | Facility: URGENT CARE | Age: 65
End: 2025-01-21
Payer: MEDICARE

## 2025-01-21 VITALS
SYSTOLIC BLOOD PRESSURE: 137 MMHG | BODY MASS INDEX: 33.3 KG/M2 | DIASTOLIC BLOOD PRESSURE: 85 MMHG | OXYGEN SATURATION: 96 % | RESPIRATION RATE: 18 BRPM | TEMPERATURE: 97.6 F | HEART RATE: 64 BPM | WEIGHT: 219 LBS

## 2025-01-21 DIAGNOSIS — R21 RASH AND NONSPECIFIC SKIN ERUPTION: Primary | ICD-10-CM

## 2025-01-21 DIAGNOSIS — L29.9 ITCHY SKIN: ICD-10-CM

## 2025-01-21 LAB
DEPRECATED S PYO AG THROAT QL EIA: NEGATIVE
S PYO DNA THROAT QL NAA+PROBE: NOT DETECTED

## 2025-01-21 PROCEDURE — 87651 STREP A DNA AMP PROBE: CPT | Performed by: PHYSICIAN ASSISTANT

## 2025-01-21 PROCEDURE — 99214 OFFICE O/P EST MOD 30 MIN: CPT | Performed by: PHYSICIAN ASSISTANT

## 2025-01-21 RX ORDER — METHYLPREDNISOLONE 4 MG/1
TABLET ORAL
Qty: 21 TABLET | Refills: 0 | Status: SHIPPED | OUTPATIENT
Start: 2025-01-21

## 2025-01-21 RX ORDER — HYDROXYZINE HYDROCHLORIDE 25 MG/1
25 TABLET, FILM COATED ORAL EVERY 8 HOURS PRN
Qty: 30 TABLET | Refills: 0 | Status: SHIPPED | OUTPATIENT
Start: 2025-01-21 | End: 2025-01-31

## 2025-01-21 NOTE — PROGRESS NOTES
ASSESSMENT/PLAN:    (R21) Rash and nonspecific skin eruption  (primary encounter diagnosis)    MDM: Diffuse, very itchy, persistent, maculopapular rash on trunk of uncertain etiology.  Strep testing done today to rule out occult strep infection (results are negative).  Patient states he is having trouble sleeping due to the severe itching and desires some treatment today.  Please see below discharge summary/plan (which I reviewed with patient today verbally prior to discharge.    Plan: Streptococcus A Rapid Screen w/Reflex to PCR -         Clinic Collect, methylPREDNISolone (MEDROL         DOSEPAK) 4 MG tablet therapy pack, Group A         Streptococcus PCR Throat Swab            PLAN:       -Strep testing to assure no occult (hidden) strep infection as source of rash  -Medrol Dosepak  -Hydroxyzine (also called Atarax) as needed for itching  -Follow-up with dermatologist and/or primary care provider if symptoms fail to fully resolve with treatment provided here today, and sooner if any change or worsening of rash or if you develop any illness symptoms    (L29.9) Itchy skin  Plan: Streptococcus A Rapid Screen w/Reflex to PCR -         Clinic Collect, methylPREDNISolone (MEDROL         DOSEPAK) 4 MG tablet therapy pack, hydrOXYzine         HCl (ATARAX) 25 MG tablet, Group A         Streptococcus PCR Throat Swab            This progress note has been dictated, with use of voice recognition software. Any grammatical, typographical, or context errors are unintentional and inherent to use of voice recognition software.  ----------        Chief Complaint   Patient presents with    Derm Problem     C/o red, bumpy rash that is spreading all over the back x4-5 weeks-there is pain and c/o itching     SUBJECTIVE:  Yrn Will is a 64 year old male who presents to the clinic today for a rash.  Patient is unable to identify any obvious provocation.    Onset of rash was approximately 4-5 weeks ago.   Rash is gradual onset,  constant, and very itchy.  Location of the rash: back and chest   Quality/symptoms of rash: itching (itches day and night and itching often keeps him up at night)  Symptoms are severe and rash seems to be stable to worsening/not improving   Previous history of a similar rash? No  Recent exposure history: none known    Associated symptoms include: No associated fever, throat tightness, wheezing, cough, tongue/lip swelling, shortness of breath, joint pain, nausea, vomiting, rhinorrhea, sore throat, and URI symptoms.    Patient has tried short duration (couple of days at a time) various skin lotions for dry skin, clotrimazole, and 2 topical prescription steroids.  He is using hypoallergenic detergent and using mild skin sensitive soap, but symptoms persist      Past Medical History:   Diagnosis Date    Esophageal reflux 9/16/2009    Gilbert's disease     Gout 9/16/2009    HTN (hypertension) 9/16/2009    Hyperlipidemia LDL goal <130 9/16/2009     Current Outpatient Medications   Medication Sig Dispense Refill    amLODIPine (NORVASC) 10 MG tablet Take 1 tablet (10 mg) by mouth daily 90 tablet 11    clotrimazole (LOTRIMIN) 1 % external cream Apply topically 2 times daily as needed (rash) 45 g 3    hydrOXYzine HCl (ATARAX) 25 MG tablet Take 1 tablet (25 mg) by mouth every 8 hours as needed for itching. 30 tablet 0    losartan (COZAAR) 25 MG tablet Take 1 tablet (25 mg) by mouth daily 90 tablet 11    metFORMIN (GLUCOPHAGE XR) 500 MG 24 hr tablet TAKE FOUR TABLETS BY MOUTH EVERY  tablet 3    methylPREDNISolone (MEDROL DOSEPAK) 4 MG tablet therapy pack Follow Package Directions 21 tablet 0    omeprazole (PRILOSEC) 20 MG DR capsule TAKE 1 CAPSULE BY MOUTH DAILY AS NEEDED 90 capsule 1    simvastatin (ZOCOR) 40 MG tablet TAKE ONE TABLET BY MOUTH AT BEDTIME 90 tablet 11    triamcinolone (KENALOG) 0.1 % external cream Apply topically 2 times daily as needed for irritation 45 g 3     Social History     Tobacco Use    Smoking  status: Former     Types: Cigarettes    Smokeless tobacco: Never   Substance Use Topics    Alcohol use: No     Alcohol/week: 0.0 standard drinks of alcohol       ROS:  CONSTITUTIONAL:NEGATIVE for fever, chills, change in weight  INTEGUMENTARY/SKIN: NEGATIVE for worrisome rashes, moles or lesions  EYES: NEGATIVE for vision changes or irritation  ENT/MOUTH: NEGATIVE for ear, mouth and throat problems  RESP:NEGATIVE for significant cough or SOB  CV: NEGATIVE for chest pain, palpitations or peripheral edema  GI: NEGATIVE for nausea, abdominal pain, heartburn, or change in bowel habits  MUSCULOSKELETAL: NEGATIVE for significant arthralgias or myalgia  NEURO: NEGATIVE for weakness, dizziness or paresthesias  ENDOCRINE: NEGATIVE for temperature intolerance, skin/hair changes  HEME/ALLERGY/IMMUNE: NEGATIVE for bleeding problems    EXAM:   /85 (BP Location: Right arm, Patient Position: Sitting, Cuff Size: Adult Regular)   Pulse 64   Temp 97.6  F (36.4  C) (Tympanic)   Resp 18   Wt 99.3 kg (219 lb)   SpO2 96%   BMI 33.30 kg/m      GENERAL: alert, no acute distress.  SKIN: Rash description:    Distribution: Positive for blanching, erythematous, maculopapular rash over entire back (with a significant number of superficial excoriations) and to a lesser extent on anterior upper chest area.  Extremities, neck, face are spared.  Specifically no petechiae, vesicular lesions, or purpuric lesions.  Specifically no associated urticaria.  GENERAL APPEARANCE: healthy, alert and no distress  EYES: EOMI,  PERRL, conjunctiva clear  NECK: supple, non-tender to palpation, no adenopathy noted  RESP: lungs clear to auscultation - no rales, rhonchi or wheezes  CV: regular rates and rhythm, normal S1 S2, no murmur noted    Results for orders placed or performed in visit on 01/21/25   Streptococcus A Rapid Screen w/Reflex to PCR - Clinic Collect     Status: Normal    Specimen: Throat; Swab   Result Value Ref Range    Group A Strep  antigen Negative Negative   Group A Streptococcus PCR Throat Swab     Status: Normal    Specimen: Throat; Swab   Result Value Ref Range    Group A strep by PCR Not Detected Not Detected    Narrative    The Xpert Xpress Strep A test, performed on the [employer], is a rapid, qualitative in vitro diagnostic test for the detection of Streptococcus pyogenes (Group A ß-hemolytic Streptococcus, Strep A) in throat swab specimens from patients with signs and symptoms of pharyngitis. The Xpert Xpress Strep A test can be used as an aid in the diagnosis of Group A Streptococcal pharyngitis. The assay is not intended to monitor treatment for Group A Streptococcus infections. The Xpert Xpress Strep A test utilizes an automated real-time polymerase chain reaction (PCR) to detect Streptococcus pyogenes DNA.

## 2025-01-21 NOTE — TELEPHONE ENCOUNTER
Patient Details  Patient's Full Name  thomas duane shand  Patient's Date of Birth  1960  Patient's Phone Number  [phone]  Patient's Email ID  [email or web address]  Has the patient visited [facility] in the past 3 years?  Yes  Address Details  Address  [address] [place]  [address]  Appointment Preferences  Reason for appointment  Red, severfer itch rash/skin on upper center back since Dec 20, 2025. I have tried regular otc lotion for dry skin, clotrimazole 1%, triaamcinolone acetonide 0.1% cream, hydrocortisone 2.5 % Topical Ointment, cleaned sheets/cloths with hypoallergenric detergent, using skin friendly bar soap. After all this my back itches like crazy.   Preferred Provider  Tatiana Angulo  Preferred Location  [facility]  Preferred Visit Type  In-person   Services   Do you need an  for your appointment?  No  Billing Preference  Which billing situation applies to the patient?  Patient has insurance  Insurance Information  Insurance Provider Name  [facility]  Member ID/ Policy Number  [id number]  Group Number  [id number]  Insurance Contact for Provider  [phone]

## 2025-02-19 ENCOUNTER — PATIENT OUTREACH (OUTPATIENT)
Dept: CARE COORDINATION | Facility: CLINIC | Age: 65
End: 2025-02-19
Payer: MEDICARE

## 2025-03-05 ENCOUNTER — PATIENT OUTREACH (OUTPATIENT)
Dept: CARE COORDINATION | Facility: CLINIC | Age: 65
End: 2025-03-05
Payer: MEDICARE

## 2025-04-20 DIAGNOSIS — E78.5 HYPERLIPIDEMIA LDL GOAL <130: ICD-10-CM

## 2025-04-20 DIAGNOSIS — I10 ESSENTIAL HYPERTENSION WITH GOAL BLOOD PRESSURE LESS THAN 140/90: ICD-10-CM

## 2025-04-20 DIAGNOSIS — R73.03 PREDIABETES: ICD-10-CM

## 2025-04-23 RX ORDER — SIMVASTATIN 40 MG
TABLET ORAL
Qty: 90 TABLET | Refills: 11 | Status: SHIPPED | OUTPATIENT
Start: 2025-04-23

## 2025-04-23 RX ORDER — LOSARTAN POTASSIUM 25 MG/1
25 TABLET ORAL DAILY
Qty: 90 TABLET | Refills: 11 | Status: SHIPPED | OUTPATIENT
Start: 2025-04-23

## 2025-04-23 RX ORDER — AMLODIPINE BESYLATE 10 MG/1
10 TABLET ORAL DAILY
Qty: 90 TABLET | Refills: 11 | Status: SHIPPED | OUTPATIENT
Start: 2025-04-23

## 2025-04-23 RX ORDER — METFORMIN HYDROCHLORIDE 500 MG/1
2000 TABLET, EXTENDED RELEASE ORAL
Qty: 360 TABLET | Refills: 3 | Status: SHIPPED | OUTPATIENT
Start: 2025-04-23

## 2025-04-24 SDOH — HEALTH STABILITY: PHYSICAL HEALTH: ON AVERAGE, HOW MANY MINUTES DO YOU ENGAGE IN EXERCISE AT THIS LEVEL?: 30 MIN

## 2025-04-24 SDOH — HEALTH STABILITY: PHYSICAL HEALTH: ON AVERAGE, HOW MANY DAYS PER WEEK DO YOU ENGAGE IN MODERATE TO STRENUOUS EXERCISE (LIKE A BRISK WALK)?: 3 DAYS

## 2025-04-24 ASSESSMENT — SOCIAL DETERMINANTS OF HEALTH (SDOH): HOW OFTEN DO YOU GET TOGETHER WITH FRIENDS OR RELATIVES?: THREE TIMES A WEEK

## 2025-05-01 ENCOUNTER — OFFICE VISIT (OUTPATIENT)
Dept: PEDIATRICS | Facility: CLINIC | Age: 65
End: 2025-05-01
Attending: INTERNAL MEDICINE
Payer: MEDICARE

## 2025-05-01 VITALS
BODY MASS INDEX: 33.39 KG/M2 | HEIGHT: 68 IN | SYSTOLIC BLOOD PRESSURE: 135 MMHG | HEART RATE: 67 BPM | OXYGEN SATURATION: 96 % | TEMPERATURE: 98 F | RESPIRATION RATE: 18 BRPM | WEIGHT: 220.3 LBS | DIASTOLIC BLOOD PRESSURE: 76 MMHG

## 2025-05-01 DIAGNOSIS — R73.03 PREDIABETES: ICD-10-CM

## 2025-05-01 DIAGNOSIS — M10.9 GOUT, UNSPECIFIED CAUSE, UNSPECIFIED CHRONICITY, UNSPECIFIED SITE: ICD-10-CM

## 2025-05-01 DIAGNOSIS — Z00.00 ENCOUNTER FOR MEDICARE ANNUAL WELLNESS EXAM: Primary | ICD-10-CM

## 2025-05-01 DIAGNOSIS — K21.9 GASTROESOPHAGEAL REFLUX DISEASE WITHOUT ESOPHAGITIS: ICD-10-CM

## 2025-05-01 DIAGNOSIS — D12.6 TUBULAR ADENOMA OF COLON: ICD-10-CM

## 2025-05-01 DIAGNOSIS — I10 ESSENTIAL HYPERTENSION WITH GOAL BLOOD PRESSURE LESS THAN 140/90: ICD-10-CM

## 2025-05-01 DIAGNOSIS — Z77.090 HISTORY OF ASBESTOS EXPOSURE: ICD-10-CM

## 2025-05-01 DIAGNOSIS — C61 PROSTATE CANCER (H): ICD-10-CM

## 2025-05-01 DIAGNOSIS — E78.5 HYPERLIPIDEMIA LDL GOAL <130: ICD-10-CM

## 2025-05-01 DIAGNOSIS — E66.01 MORBID OBESITY (H): ICD-10-CM

## 2025-05-01 LAB
EST. AVERAGE GLUCOSE BLD GHB EST-MCNC: 108 MG/DL
HBA1C MFR BLD: 5.4 % (ref 0–5.6)

## 2025-05-01 PROCEDURE — 36415 COLL VENOUS BLD VENIPUNCTURE: CPT | Performed by: INTERNAL MEDICINE

## 2025-05-01 PROCEDURE — 83036 HEMOGLOBIN GLYCOSYLATED A1C: CPT | Performed by: INTERNAL MEDICINE

## 2025-05-01 PROCEDURE — 84153 ASSAY OF PSA TOTAL: CPT | Performed by: INTERNAL MEDICINE

## 2025-05-01 PROCEDURE — G2211 COMPLEX E/M VISIT ADD ON: HCPCS | Performed by: INTERNAL MEDICINE

## 2025-05-01 PROCEDURE — 3075F SYST BP GE 130 - 139MM HG: CPT | Performed by: INTERNAL MEDICINE

## 2025-05-01 PROCEDURE — 80061 LIPID PANEL: CPT | Performed by: INTERNAL MEDICINE

## 2025-05-01 PROCEDURE — 80053 COMPREHEN METABOLIC PANEL: CPT | Performed by: INTERNAL MEDICINE

## 2025-05-01 PROCEDURE — 3078F DIAST BP <80 MM HG: CPT | Performed by: INTERNAL MEDICINE

## 2025-05-01 PROCEDURE — G0439 PPPS, SUBSEQ VISIT: HCPCS | Performed by: INTERNAL MEDICINE

## 2025-05-01 PROCEDURE — 99214 OFFICE O/P EST MOD 30 MIN: CPT | Mod: 25 | Performed by: INTERNAL MEDICINE

## 2025-05-01 RX ORDER — OMEPRAZOLE 20 MG/1
20 CAPSULE, DELAYED RELEASE ORAL DAILY PRN
Qty: 90 CAPSULE | Refills: 3 | Status: SHIPPED | OUTPATIENT
Start: 2025-05-01

## 2025-05-01 NOTE — PATIENT INSTRUCTIONS
Patient Education   Preventive Care Advice   This is general advice given by our system to help you stay healthy. However, your care team may have specific advice just for you. Please talk to your care team about your preventive care needs.  Nutrition  Eat 5 or more servings of fruits and vegetables each day.  Try wheat bread, brown rice and whole grain pasta (instead of white bread, rice, and pasta).  Get enough calcium and vitamin D. Check the label on foods and aim for 100% of the RDA (recommended daily allowance).  Lifestyle  Exercise at least 150 minutes each week  (30 minutes a day, 5 days a week).  Do muscle strengthening activities 2 days a week. These help control your weight and prevent disease.  No smoking.  Wear sunscreen to prevent skin cancer.  Have a dental exam and cleaning every 6 months.  Yearly exams  See your health care team every year to talk about:  Any changes in your health.  Any medicines your care team has prescribed.  Preventive care, family planning, and ways to prevent chronic diseases.  Shots (vaccines)   HPV shots (up to age 26), if you've never had them before.  Hepatitis B shots (up to age 59), if you've never had them before.  COVID-19 shot: Get this shot when it's due.  Flu shot: Get a flu shot every year.  Tetanus shot: Get a tetanus shot every 10 years.  Pneumococcal, hepatitis A, and RSV shots: Ask your care team if you need these based on your risk.  Shingles shot (for age 50 and up)  General health tests  Diabetes screening:  Starting at age 35, Get screened for diabetes at least every 3 years.  If you are younger than age 35, ask your care team if you should be screened for diabetes.  Cholesterol test: At age 39, start having a cholesterol test every 5 years, or more often if advised.  Bone density scan (DEXA): At age 50, ask your care team if you should have this scan for osteoporosis (brittle bones).  Hepatitis C: Get tested at least once in your life.  STIs (sexually  transmitted infections)  Before age 24: Ask your care team if you should be screened for STIs.  After age 24: Get screened for STIs if you're at risk. You are at risk for STIs (including HIV) if:  You are sexually active with more than one person.  You don't use condoms every time.  You or a partner was diagnosed with a sexually transmitted infection.  If you are at risk for HIV, ask about PrEP medicine to prevent HIV.  Get tested for HIV at least once in your life, whether you are at risk for HIV or not.  Cancer screening tests  Cervical cancer screening: If you have a cervix, begin getting regular cervical cancer screening tests starting at age 21.  Breast cancer scan (mammogram): If you've ever had breasts, begin having regular mammograms starting at age 40. This is a scan to check for breast cancer.  Colon cancer screening: It is important to start screening for colon cancer at age 45.  Have a colonoscopy test every 10 years (or more often if you're at risk) Or, ask your provider about stool tests like a FIT test every year or Cologuard test every 3 years.  To learn more about your testing options, visit:   .  For help making a decision, visit:   https://bit.ly/dj37456.  Prostate cancer screening test: If you have a prostate, ask your care team if a prostate cancer screening test (PSA) at age 55 is right for you.  Lung cancer screening: If you are a current or former smoker ages 50 to 80, ask your care team if ongoing lung cancer screenings are right for you.  For informational purposes only. Not to replace the advice of your health care provider. Copyright   2023 [facility]. All rights reserved. Clinically reviewed by the [facility]. [employer] 746518 - REV 01/24.  Eating Healthy Foods: Care Instructions  With every meal, you can make healthy food choices. Try to eat a variety of fruits, vegetables, whole grains, lean proteins, and low-fat dairy products. This can help  "you get the right balance of nutrients, including vitamins and minerals. Small changes add up over time. You can start by adding one healthy food to your meals each day.    Try to make half your plate fruits and vegetables, one-fourth whole grains, and one-fourth lean proteins. Try including dairy with your meals.   Eat more fruits and vegetables. Try to have them with most meals and snacks.   Foods for healthy eating        Fruits   These can be fresh, frozen, canned, or dried.  Try to choose whole fruit rather than fruit juice.  Eat a variety of colors.        Vegetables   These can be fresh, frozen, canned, or dried.  Beans, peas, and lentils count too.        Whole grains   Choose whole-grain breads, cereals, and noodles.  Try brown rice.        Lean proteins   These can include lean meat, poultry, fish, and eggs.  You can also have tofu, beans, peas, lentils, nuts, and seeds.        Dairy   Try milk, yogurt, and cheese.  Choose low-fat or fat-free when you can.  If you need to, use lactose-free milk or fortified plant-based milk products, such as soy milk.        Water   Drink water when you're thirsty.  Limit sugar-sweetened drinks, including soda, fruit drinks, and sports drinks.  Where can you learn more?  Go to https://www.[employer].net/patiented  Enter T756 in the search box to learn more about \"Eating Healthy Foods: Care Instructions.\"  Current as of: October 7, 2024  Content Version: 14.4 2024-2025 [employer].   Care instructions adapted under license by your healthcare professional. If you have questions about a medical condition or this instruction, always ask your healthcare professional. [employer] disclaims any warranty or liability for your use of this information.    Hearing Loss: Care Instructions  Overview     Hearing loss is a sudden or slow decrease in how well you hear. It can range from slight to profound. Permanent hearing loss can occur with aging. It also can " happen when you are exposed long-term to loud noise. Examples include listening to loud music, riding motorcycles, or being around other loud machines.  Hearing loss can affect your work and home life. It can make you feel lonely or depressed. You may feel that you have lost your independence. But hearing aids and other devices can help you hear better and feel connected to others.  Follow-up care is a key part of your treatment and safety. Be sure to make and go to all appointments, and call your doctor if you are having problems. It's also a good idea to know your test results and keep a list of the medicines you take.  How can you care for yourself at home?  Avoid loud noises whenever possible. This helps keep your hearing from getting worse.  Always wear hearing protection around loud noises.  Wear a hearing aid as directed.  A professional can help you pick a hearing aid that will work best for you.  You can also get hearing aids over the counter for mild to moderate hearing loss.  Have hearing tests as your doctor suggests. They can show whether your hearing has changed. Your hearing aid may need to be adjusted.  Use other devices as needed. These may include:  Telephone amplifiers and hearing aids that can connect to a television, stereo, radio, or microphone.  Devices that use lights or vibrations. These alert you to the doorbell, a ringing telephone, or a baby monitor.  Television closed-captioning. This shows the words at the bottom of the screen. Most new TVs can do this.  TTY (text telephone). This lets you type messages back and forth on the telephone instead of talking or listening. These devices are also called TDD. When messages are typed on the keyboard, they are sent over the phone line to a receiving TTY. The message is shown on a monitor.  Use text messaging, social media, and email if it is hard for you to communicate by telephone.  Try to learn a listening technique called speechreading. It is  "not lipreading. You pay attention to people's gestures, expressions, posture, and tone of voice. These clues can help you understand what a person is saying. Face the person you are talking to, and have them face you. Make sure the lighting is good. You need to see the other person's face clearly.  Think about counseling if you need help to adjust to your hearing loss.  When should you call for help?  Watch closely for changes in your health, and be sure to contact your doctor if:    You think your hearing is getting worse.     You have new symptoms, such as dizziness or nausea.   Where can you learn more?  Go to https://www.[employer].net/patiented  Enter R798 in the search box to learn more about \"Hearing Loss: Care Instructions.\"  Current as of: October 27, 2024  Content Version: 14.4    1815-3046 [employer].   Care instructions adapted under license by your healthcare professional. If you have questions about a medical condition or this instruction, always ask your healthcare professional. [employer] disclaims any warranty or liability for your use of this information.       "

## 2025-05-01 NOTE — PROGRESS NOTES
"Preventive Care Visit  [facility] LETY Reagan MD, Internal Medicine - Pediatrics  May 1, 2025      Assessment & Plan     (Z00.00) Encounter for Medicare annual wellness exam  (primary encounter diagnosis)    (R73.03) Prediabetes  Comment:   Plan: Hemoglobin A1c     Discussed weight loss goals, increasing activity          (I10) Essential hypertension with goal blood pressure less than 140/90  Comment:   Plan: Adequate control.  Continue current medications    (E78.5) Hyperlipidemia LDL goal <130  Comment:   Lab Results   Component Value Date    LDL 89 05/01/2025   Plan: Lipid panel reflex to direct LDL Non-fasting,         Comprehensive metabolic panel (BMP + Alb, Alk         Phos, ALT, AST, Total. Bili, TP)            Well controlled, continue simvastatin    (C61) Prostate cancer (H)  Comment:   Plan: PSA, tumor marker        Prior prostatectomy, due for labwork    (M10.9) Gout, unspecified cause, unspecified chronicity, unspecified site  Comment:   Plan: no recent flare, stable    (K21.9) Gastroesophageal reflux disease without esophagitis  Comment:   Plan: omeprazole (PRILOSEC) 20 MG DR capsule        Well controlled, continue omeprazole    (E66.01) Morbid obesity (H)  Comment:   Plan: severe obesity complicating prediabetes, HTN, hyperlipidemia    (D12.6) Tubular adenoma of colon  Comment:   Plan: colonoscopy 2024    (Z77.090) History of asbestos exposure  Comment:   Plan: served in [employer] several decades ago, has questions about potential asbestos exposure.  Discussed screening CXR/ pt states he will consider        BMI  Estimated body mass index is 33.78 kg/m  as calculated from the following:    Height as of this encounter: 1.72 m (5' 7.72\").    Weight as of this encounter: 99.9 kg (220 lb 4.8 oz).       Counseling  Appropriate preventive services were addressed with this patient via screening, questionnaire, or discussion as appropriate for fall prevention, nutrition, physical activity, " Tobacco-use cessation, social engagement, weight loss and cognition.  Checklist reviewing preventive services available has been given to the patient.      Juanito Mims is a 65 year old, presenting for the following:  Wellness Visit            HPI           Advance Care Planning    Discussed advance care planning with patient; however, patient declined at this time.        4/24/2025   General Health   How would you rate your overall physical health? Good   Feel stress (tense, anxious, or unable to sleep) Not at all         4/24/2025   Nutrition   Diet: Regular (no restrictions)         4/24/2025   Exercise   Days per week of moderate/strenous exercise 3 days   Average minutes spent exercising at this level 30 min         4/24/2025   Social Factors   Frequency of gathering with friends or relatives Three times a week   Worry food won't last until get money to buy more No   Food not last or not have enough money for food? No   Do you have housing? (Housing is defined as stable permanent housing and does not include staying outside in a car, in a tent, in an abandoned building, in an overnight shelter, or couch-surfing.) Yes   Are you worried about losing your housing? No   Lack of transportation? No   Unable to get utilities (heat,electricity)? No         4/24/2025   Fall Risk   Fallen 2 or more times in the past year? No   Trouble with walking or balance? No          4/24/2025   Activities of Daily Living- Home Safety   Needs help with the following daily activites None of the above   Safety concerns in the home None of the above         4/24/2025   Dental   Dentist two times every year? (!) NO         4/24/2025   Hearing Screening   Hearing concerns? (!) IT'S HARD TO FOLLOW A CONVERSATION IN A NOISY RESTAURANT OR CROWDED ROOM.         4/24/2025   Driving Risk Screening   Patient/family members have concerns about driving No         4/24/2025   General Alertness/Fatigue Screening   Have you been more tired than  usual lately? No         2025   Urinary Incontinence Screening   Bothered by leaking urine in past 6 months No         Today's PHQ-2 Score:       2025     1:56 PM   PHQ-2 (  [employer])   Q1: Little interest or pleasure in doing things 0   Q2: Feeling down, depressed or hopeless 0   PHQ-2 Score 0    Q1: Little interest or pleasure in doing things Not at all   Q2: Feeling down, depressed or hopeless Not at all   PHQ-2 Score 0       Patient-reported           2025   Substance Use   Alcohol more than 3/day or more than 7/wk Not Applicable   Do you have a current opioid prescription? No   How severe/bad is pain from 1 to 10? 0/10 (No Pain)   Do you use any other substances recreationally? No     Social History     Tobacco Use    Smoking status: Former     Current packs/day: 0.00     Types: Cigarettes     Quit date: 3/11/1981     Years since quittin.1     Passive exposure: Past    Smokeless tobacco: Never    Tobacco comments:     smoked when in [employer], not much but a Pack a week maybe.   Vaping Use    Vaping status: Never Used   Substance Use Topics    Alcohol use: No    Drug use: No       Last PSA:   PSA   Date Value Ref Range Status   2020 <0.01 0 - 4 ug/L Final     Comment:     Assay Method:  Chemiluminescence using Siemens Vista analyzer     PSA Tumor Marker   Date Value Ref Range Status   2025 <0.01 0.00 - 4.50 ng/mL Final   2021 <0.01 0.00 - 4.00 ug/L Final     ASCVD Risk   The 10-year ASCVD risk score (Jose RAMIREZ, et al., 2019) is: 17.9%    Values used to calculate the score:      Age: 65 years      Sex: Male      Is Non- : No      Diabetic: No      Tobacco smoker: No      Systolic Blood Pressure: 135 mmHg      Is BP treated: Yes      HDL Cholesterol: 46 mg/dL      Total Cholesterol: 175 mg/dL            Reviewed and updated as needed this visit by Provider                    Patient Active Problem List   Diagnosis    Hyperlipidemia LDL goal <130     Hepatic steatosis    Gilbert's disease    Prediabetes    Essential hypertension with goal blood pressure less than 140/90    Gout, unspecified cause, unspecified chronicity, unspecified site    Gastroesophageal reflux disease without esophagitis    Class 1 obesity due to excess calories with serious comorbidity and body mass index (BMI) of 34.0 to 34.9 in adult    Prostate cancer (H)    Stress incontinence of urine    Calculus of gallbladder without cholecystitis    Morbid obesity (H)    Tubular adenoma of colon     Past Surgical History:   Procedure Laterality Date    BIOPSY PROSTATE TRANSRECTAL  2017    bx showed BPH, negative for prostate cancer    COLONOSCOPY N/A 2024    Procedure: Colonoscopy;  Surgeon: Enrico Hernandez MD;  Location: [facility]    DAVINCI PROSTATECTOMY  2018    LASIK      repair right hydronephrosis  Age 14       Social History     Tobacco Use    Smoking status: Former     Current packs/day: 0.00     Types: Cigarettes     Quit date: 3/11/1981     Years since quittin.1     Passive exposure: Past    Smokeless tobacco: Never    Tobacco comments:     smoked when in [employer], not much but a Pack a week maybe.   Substance Use Topics    Alcohol use: No     Family History   Problem Relation Age of Onset    Colon Cancer Mother         86 year old  of colon cancer    Hypertension Mother     Hypertension Father     C.A.D. Brother         stent, age 56    Prostate Cancer Brother         passed away at age 70 from prostate cancer    C.A.D. Maternal Uncle         MI in 50's         Current Outpatient Medications   Medication Sig Dispense Refill    amLODIPine (NORVASC) 10 MG tablet TAKE ONE TABLET BY MOUTH EVERY DAY 90 tablet 11    clotrimazole (LOTRIMIN) 1 % external cream Apply topically 2 times daily as needed (rash) 45 g 3    losartan (COZAAR) 25 MG tablet TAKE ONE TABLET BY MOUTH EVERY DAY 90 tablet 11    metFORMIN (GLUCOPHAGE XR) 500 MG 24 hr tablet TAKE FOUR TABLETS BY MOUTH EVERY DAY  360 tablet 3    omeprazole (PRILOSEC) 20 MG DR capsule Take 1 capsule (20 mg) by mouth daily as needed (reflux). 90 capsule 3    simvastatin (ZOCOR) 40 MG tablet TAKE ONE TABLET BY MOUTH AT BEDTIME 90 tablet 11    triamcinolone (KENALOG) 0.1 % external cream Apply topically 2 times daily as needed for irritation 45 g 3     Current providers sharing in care for this patient include:  Patient Care Team:  Enrico Reagan MD as PCP - General (Internal Medicine - Pediatrics)  Enrico Reagan MD as Assigned PCP  Cornelio Robertson PA-C as Physician Assistant (Family Medicine)  Yareli Wiley Chi, OD as MD (Optometry)    The following health maintenance items are reviewed in Epic and correct as of today:  Health Maintenance   Topic Date Due    HEPATITIS A IMMUNIZATION (1 of 2 - Risk 2-dose series) Never done    HEPATITIS B IMMUNIZATION (1 of 3 - Risk 3-dose series) Never done    Pneumococcal Vaccine: 50+ Years (2 of 2 - PCV) 10/16/2020    ANNUAL REVIEW OF HM ORDERS  09/30/2022    MICROALBUMIN  07/27/2023    COVID-19 Vaccine (3 - 2024-25 season) 09/01/2024    INFLUENZA VACCINE (Season Ended) 09/01/2025    MEDICARE ANNUAL WELLNESS VISIT  05/01/2026    BMP  05/01/2026    LIPID  05/01/2026    FALL RISK ASSESSMENT  05/01/2026    DIABETES SCREENING  05/01/2028    COLORECTAL CANCER SCREENING  05/16/2029    ADVANCE CARE PLANNING  05/01/2030    DTAP/TDAP/TD IMMUNIZATION (3 - Td or Tdap) 06/09/2030    HEPATITIS C SCREENING  Completed    PHQ-2 (once per calendar year)  Completed    URINALYSIS  Completed    ZOSTER IMMUNIZATION  Completed    RSV VACCINE  Completed    AORTIC ANEURYSM SCREENING (SYSTEM ASSIGNED)  Completed    HPV IMMUNIZATION  Aged Out    MENINGITIS IMMUNIZATION  Aged Out    HEMOGLOBIN  Discontinued    LUNG CANCER SCREENING  Discontinued         Review of Systems  Constitutional, neuro, ENT, endocrine, pulmonary, cardiac, gastrointestinal, genitourinary, musculoskeletal, integument and psychiatric systems are  "negative, except as otherwise noted.     Objective    Exam  /76 (BP Location: Right arm, Patient Position: Sitting, Cuff Size: Adult Large)   Pulse 67   Temp 98  F (36.7  C) (Temporal)   Resp 18   Ht 1.72 m (5' 7.72\")   Wt 99.9 kg (220 lb 4.8 oz)   SpO2 96%   BMI 33.78 kg/m     Estimated body mass index is 33.78 kg/m  as calculated from the following:    Height as of this encounter: 1.72 m (5' 7.72\").    Weight as of this encounter: 99.9 kg (220 lb 4.8 oz).    Physical Exam  GENERAL: alert and no distress  EYES: Eyes grossly normal to inspection, PERRL and conjunctivae and sclerae normal  HENT: ear canals and TM's normal, nose and mouth without ulcers or lesions  NECK: no adenopathy, no asymmetry, masses, or scars  RESP: lungs clear to auscultation - no rales, rhonchi or wheezes  CV: regular rate and rhythm, normal S1 S2, no S3 or S4, no murmur, click or rub, no peripheral edema  ABDOMEN: soft, nontender, no hepatosplenomegaly, no masses and bowel sounds normal  MS: no gross musculoskeletal defects noted, no edema  SKIN: no suspicious lesions or rashes  NEURO: Normal strength and tone, mentation intact and speech normal  PSYCH: mentation appears normal, affect normal/bright        5/1/2025   Mini Cog   Mini-Cog Not Completed (choose reason) Patient declines       Patient declines, there are NO concerns for cognitive deficits.          3/21/2024   Vision Screen   Vision Screen Results Pass       Signed Electronically by: Enrico Reagan MD    "

## 2025-05-02 LAB
ALBUMIN SERPL BCG-MCNC: 4.8 G/DL (ref 3.5–5.2)
ALP SERPL-CCNC: 91 U/L (ref 40–150)
ALT SERPL W P-5'-P-CCNC: 42 U/L (ref 0–70)
ANION GAP SERPL CALCULATED.3IONS-SCNC: 14 MMOL/L (ref 7–15)
AST SERPL W P-5'-P-CCNC: 45 U/L (ref 0–45)
BILIRUB SERPL-MCNC: 2 MG/DL
BUN SERPL-MCNC: 16.6 MG/DL (ref 8–23)
CALCIUM SERPL-MCNC: 9.9 MG/DL (ref 8.8–10.4)
CHLORIDE SERPL-SCNC: 102 MMOL/L (ref 98–107)
CHOLEST SERPL-MCNC: 175 MG/DL
CREAT SERPL-MCNC: 1.31 MG/DL (ref 0.67–1.17)
EGFRCR SERPLBLD CKD-EPI 2021: 60 ML/MIN/1.73M2
FASTING STATUS PATIENT QL REPORTED: YES
FASTING STATUS PATIENT QL REPORTED: YES
GLUCOSE SERPL-MCNC: 102 MG/DL (ref 70–99)
HCO3 SERPL-SCNC: 24 MMOL/L (ref 22–29)
HDLC SERPL-MCNC: 46 MG/DL
LDLC SERPL CALC-MCNC: 89 MG/DL
NONHDLC SERPL-MCNC: 129 MG/DL
POTASSIUM SERPL-SCNC: 4.6 MMOL/L (ref 3.4–5.3)
PROT SERPL-MCNC: 7.7 G/DL (ref 6.4–8.3)
PSA SERPL DL<=0.01 NG/ML-MCNC: <0.01 NG/ML (ref 0–4.5)
SODIUM SERPL-SCNC: 140 MMOL/L (ref 135–145)
TRIGL SERPL-MCNC: 202 MG/DL

## (undated) DEVICE — KIT ENDO TURNOVER/PROCEDURE W/CLEAN A SCOPE LINERS 103888

## (undated) RX ORDER — FENTANYL CITRATE 50 UG/ML
INJECTION, SOLUTION INTRAMUSCULAR; INTRAVENOUS
Status: DISPENSED
Start: 2024-05-16

## (undated) RX ORDER — SIMETHICONE 40MG/0.6ML
SUSPENSION, DROPS(FINAL DOSAGE FORM)(ML) ORAL
Status: DISPENSED
Start: 2019-08-07

## (undated) RX ORDER — ONDANSETRON 2 MG/ML
INJECTION INTRAMUSCULAR; INTRAVENOUS
Status: DISPENSED
Start: 2018-02-07

## (undated) RX ORDER — FENTANYL CITRATE 50 UG/ML
INJECTION, SOLUTION INTRAMUSCULAR; INTRAVENOUS
Status: DISPENSED
Start: 2019-08-07